# Patient Record
Sex: MALE | Race: WHITE
[De-identification: names, ages, dates, MRNs, and addresses within clinical notes are randomized per-mention and may not be internally consistent; named-entity substitution may affect disease eponyms.]

---

## 2017-01-04 ENCOUNTER — HOSPITAL ENCOUNTER (EMERGENCY)
Age: 57
Discharge: HOME | End: 2017-01-04
Payer: MEDICARE

## 2017-01-04 DIAGNOSIS — N40.0: ICD-10-CM

## 2017-01-04 DIAGNOSIS — Z79.4: ICD-10-CM

## 2017-01-04 DIAGNOSIS — Z79.82: ICD-10-CM

## 2017-01-04 DIAGNOSIS — K21.9: ICD-10-CM

## 2017-01-04 DIAGNOSIS — Z98.61: ICD-10-CM

## 2017-01-04 DIAGNOSIS — M19.90: ICD-10-CM

## 2017-01-04 DIAGNOSIS — R26.9: ICD-10-CM

## 2017-01-04 DIAGNOSIS — E78.00: ICD-10-CM

## 2017-01-04 DIAGNOSIS — E10.42: ICD-10-CM

## 2017-01-04 DIAGNOSIS — I25.10: ICD-10-CM

## 2017-01-04 DIAGNOSIS — R53.1: Primary | ICD-10-CM

## 2017-01-04 DIAGNOSIS — Z95.810: ICD-10-CM

## 2017-01-04 DIAGNOSIS — I10: ICD-10-CM

## 2017-01-04 DIAGNOSIS — I25.2: ICD-10-CM

## 2017-01-04 DIAGNOSIS — Z87.891: ICD-10-CM

## 2017-01-27 ENCOUNTER — HOSPITAL ENCOUNTER (OUTPATIENT)
Age: 57
Discharge: HOME | End: 2017-01-27
Payer: MEDICARE

## 2017-01-27 DIAGNOSIS — E10.65: Primary | ICD-10-CM

## 2017-04-05 ENCOUNTER — HOSPITAL ENCOUNTER (OUTPATIENT)
Age: 57
Discharge: HOME | End: 2017-04-05
Payer: MEDICARE

## 2017-04-05 DIAGNOSIS — D69.2: Primary | ICD-10-CM

## 2017-04-17 ENCOUNTER — HOSPITAL ENCOUNTER (OUTPATIENT)
Age: 57
Discharge: ANOTHER HOSPITAL | End: 2017-04-17
Payer: MEDICARE

## 2017-04-17 ENCOUNTER — HOSPITAL ENCOUNTER (EMERGENCY)
Age: 57
Discharge: HOME | End: 2017-04-17
Payer: MEDICARE

## 2017-04-17 DIAGNOSIS — Z79.4: ICD-10-CM

## 2017-04-17 DIAGNOSIS — R53.1: Primary | ICD-10-CM

## 2017-04-17 DIAGNOSIS — I10: ICD-10-CM

## 2017-04-17 DIAGNOSIS — M19.90: ICD-10-CM

## 2017-04-17 DIAGNOSIS — R26.2: ICD-10-CM

## 2017-04-17 DIAGNOSIS — E10.42: ICD-10-CM

## 2017-04-17 DIAGNOSIS — E78.00: ICD-10-CM

## 2017-04-17 DIAGNOSIS — R51: ICD-10-CM

## 2017-04-17 DIAGNOSIS — I25.2: ICD-10-CM

## 2017-04-17 DIAGNOSIS — K21.9: ICD-10-CM

## 2017-04-17 DIAGNOSIS — Z79.82: ICD-10-CM

## 2017-04-17 DIAGNOSIS — Z87.891: ICD-10-CM

## 2017-04-17 DIAGNOSIS — I25.10: ICD-10-CM

## 2017-04-17 DIAGNOSIS — E86.0: Primary | ICD-10-CM

## 2017-04-17 DIAGNOSIS — E10.649: ICD-10-CM

## 2017-04-17 DIAGNOSIS — N40.0: ICD-10-CM

## 2017-04-17 PROCEDURE — 99284 EMERGENCY DEPT VISIT MOD MDM: CPT

## 2017-04-17 PROCEDURE — 80053 COMPREHEN METABOLIC PANEL: CPT

## 2017-04-17 PROCEDURE — 36415 COLL VENOUS BLD VENIPUNCTURE: CPT

## 2017-04-17 PROCEDURE — 83690 ASSAY OF LIPASE: CPT

## 2017-04-17 PROCEDURE — 81003 URINALYSIS AUTO W/O SCOPE: CPT

## 2017-04-17 PROCEDURE — 96360 HYDRATION IV INFUSION INIT: CPT

## 2017-04-17 PROCEDURE — 85025 COMPLETE CBC W/AUTO DIFF WBC: CPT

## 2017-05-08 ENCOUNTER — HOSPITAL ENCOUNTER (OUTPATIENT)
Dept: HOSPITAL 76 - ED | Age: 57
Setting detail: OBSERVATION
LOS: 1 days | Discharge: HOME | End: 2017-05-09
Attending: PHYSICIAN ASSISTANT | Admitting: NURSE PRACTITIONER
Payer: MEDICARE

## 2017-05-08 DIAGNOSIS — M19.90: ICD-10-CM

## 2017-05-08 DIAGNOSIS — I50.20: ICD-10-CM

## 2017-05-08 DIAGNOSIS — Z79.4: ICD-10-CM

## 2017-05-08 DIAGNOSIS — I11.0: ICD-10-CM

## 2017-05-08 DIAGNOSIS — G89.29: ICD-10-CM

## 2017-05-08 DIAGNOSIS — F32.9: ICD-10-CM

## 2017-05-08 DIAGNOSIS — F41.9: ICD-10-CM

## 2017-05-08 DIAGNOSIS — E78.5: ICD-10-CM

## 2017-05-08 DIAGNOSIS — E27.1: ICD-10-CM

## 2017-05-08 DIAGNOSIS — E10.65: ICD-10-CM

## 2017-05-08 DIAGNOSIS — I25.10: ICD-10-CM

## 2017-05-08 DIAGNOSIS — Z79.82: ICD-10-CM

## 2017-05-08 DIAGNOSIS — N40.1: ICD-10-CM

## 2017-05-08 DIAGNOSIS — Z95.810: ICD-10-CM

## 2017-05-08 DIAGNOSIS — E66.9: ICD-10-CM

## 2017-05-08 DIAGNOSIS — K21.9: ICD-10-CM

## 2017-05-08 DIAGNOSIS — Z95.5: ICD-10-CM

## 2017-05-08 DIAGNOSIS — Z79.84: ICD-10-CM

## 2017-05-08 DIAGNOSIS — R39.11: ICD-10-CM

## 2017-05-08 DIAGNOSIS — I25.5: ICD-10-CM

## 2017-05-08 DIAGNOSIS — Z79.891: ICD-10-CM

## 2017-05-08 DIAGNOSIS — R20.2: Primary | ICD-10-CM

## 2017-05-08 DIAGNOSIS — Z79.51: ICD-10-CM

## 2017-05-08 DIAGNOSIS — E10.42: ICD-10-CM

## 2017-05-08 DIAGNOSIS — Z79.899: ICD-10-CM

## 2017-05-08 DIAGNOSIS — K58.9: ICD-10-CM

## 2017-05-08 DIAGNOSIS — Z96.41: ICD-10-CM

## 2017-05-08 LAB
ALBUMIN/GLOB SERPL: 1.4 {RATIO} (ref 1–2.2)
ANION GAP SERPL CALCULATED.4IONS-SCNC: 7 MMOL/L (ref 6–13)
BASOPHILS NFR BLD AUTO: 0.1 10^3/UL (ref 0–0.1)
BASOPHILS NFR BLD AUTO: 0.6 %
BILIRUB BLD-MCNC: 0.4 MG/DL (ref 0.2–1)
BUN SERPL-MCNC: 20 MG/DL (ref 6–20)
CALCIUM UR-MCNC: 9.2 MG/DL (ref 8.5–10.3)
CHLORIDE SERPL-SCNC: 96 MMOL/L (ref 101–111)
CO2 SERPL-SCNC: 31 MMOL/L (ref 21–32)
CREAT SERPLBLD-SCNC: 1.2 MG/DL (ref 0.6–1.2)
EOSINOPHIL # BLD AUTO: 0.1 10^3/UL (ref 0–0.7)
EOSINOPHIL NFR BLD AUTO: 0.9 %
ERYTHROCYTE [DISTWIDTH] IN BLOOD BY AUTOMATED COUNT: 13.6 % (ref 12–15)
GFRSERPLBLD MDRD-ARVRAT: 63 ML/MIN/{1.73_M2} (ref 89–?)
GLOBULIN SER-MCNC: 2.8 G/DL (ref 2.1–4.2)
GLUCOSE SERPL-MCNC: 362 MG/DL (ref 70–100)
HCT VFR BLD AUTO: 37.7 % (ref 42–52)
HGB UR QL STRIP: 12.8 G/DL (ref 14–18)
LIPASE SERPL-CCNC: 22 U/L (ref 22–51)
LYMPHOCYTES # SPEC AUTO: 1.5 10^3/UL (ref 1.5–3.5)
LYMPHOCYTES NFR BLD AUTO: 14.6 %
MCH RBC QN AUTO: 31.3 PG (ref 27–31)
MCHC RBC AUTO-ENTMCNC: 34 G/DL (ref 32–36)
MCV RBC AUTO: 92.1 FL (ref 80–94)
MONOCYTES # BLD AUTO: 1 10^3/UL (ref 0–1)
MONOCYTES NFR BLD AUTO: 9.8 %
NEUTROPHILS # BLD AUTO: 7.5 10^3/UL (ref 1.5–6.6)
NEUTROPHILS # SNV AUTO: 10.2 X10^3/UL (ref 4.8–10.8)
NEUTROPHILS NFR BLD AUTO: 74.1 %
NRBC # BLD AUTO: 0 /100WBC
PDW BLD AUTO: 9.6 FL (ref 7.4–11.4)
POTASSIUM SERPL-SCNC: 5 MMOL/L (ref 3.5–5)
PROT SPEC-MCNC: 6.7 G/DL (ref 6.7–8.2)
RBC MAR: 4.1 10^6/UL (ref 4.7–6.1)
SODIUM SERPLBLD-SCNC: 134 MMOL/L (ref 135–145)
WBC # BLD: 10.2 X10^3/UL

## 2017-05-08 PROCEDURE — 93005 ELECTROCARDIOGRAM TRACING: CPT

## 2017-05-08 PROCEDURE — 99217: CPT

## 2017-05-08 PROCEDURE — 96360 HYDRATION IV INFUSION INIT: CPT

## 2017-05-08 PROCEDURE — 80048 BASIC METABOLIC PNL TOTAL CA: CPT

## 2017-05-08 PROCEDURE — 70498 CT ANGIOGRAPHY NECK: CPT

## 2017-05-08 PROCEDURE — 93010 ELECTROCARDIOGRAM REPORT: CPT

## 2017-05-08 PROCEDURE — 70496 CT ANGIOGRAPHY HEAD: CPT

## 2017-05-08 PROCEDURE — 96361 HYDRATE IV INFUSION ADD-ON: CPT

## 2017-05-08 PROCEDURE — 99284 EMERGENCY DEPT VISIT MOD MDM: CPT

## 2017-05-08 PROCEDURE — 70450 CT HEAD/BRAIN W/O DYE: CPT

## 2017-05-08 PROCEDURE — 83690 ASSAY OF LIPASE: CPT

## 2017-05-08 PROCEDURE — 99218: CPT

## 2017-05-08 PROCEDURE — 80053 COMPREHEN METABOLIC PANEL: CPT

## 2017-05-08 PROCEDURE — 85025 COMPLETE CBC W/AUTO DIFF WBC: CPT

## 2017-05-08 PROCEDURE — 36415 COLL VENOUS BLD VENIPUNCTURE: CPT

## 2017-05-08 PROCEDURE — 93306 TTE W/DOPPLER COMPLETE: CPT

## 2017-05-08 PROCEDURE — 99285 EMERGENCY DEPT VISIT HI MDM: CPT

## 2017-05-08 RX ADMIN — PREGABALIN SCH MG: 100 CAPSULE ORAL at 21:25

## 2017-05-08 RX ADMIN — CARVEDILOL SCH MG: 12.5 TABLET, FILM COATED ORAL at 21:12

## 2017-05-08 RX ADMIN — OXYCODONE SCH MG: 5 TABLET ORAL at 21:11

## 2017-05-08 RX ADMIN — PANTOPRAZOLE SODIUM SCH MG: 40 TABLET, DELAYED RELEASE ORAL at 21:11

## 2017-05-08 RX ADMIN — GABAPENTIN SCH MG: 300 CAPSULE ORAL at 21:11

## 2017-05-08 RX ADMIN — SODIUM CHLORIDE, PRESERVATIVE FREE SCH: 5 INJECTION INTRAVENOUS at 21:27

## 2017-05-08 NOTE — CT REPORT
EXAM:

CT ANGIOGRAM HEAD AND NECK .

CT SCAN OF THE HEAD WITHOUT AND WITH CONTRAST.

 

EXAM DATE: 5/8/2017 05:46 PM.

 

CLINICAL HISTORY: L sided numbness.

 

COMPARISON: CT scan of the head 05/08/2017, 01/04/2017.

 

TECHNIQUE: Routine axial helical CTA imaging was performed from the aortic arch through the [place] of
 [place]. Routine axial helical CT imaging of the head was performed prior to and following intravenou
s contrast administration. Iodinated IV contrast: 100 cc Isovue-300. Reconstructions: Routine multipl
sirena 3D MIP reconstructions. NASCET Criteria are used for stenosis measurements.

 

In accordance with CT protocol optimization, one or more of the following dose reduction techniques w
ere utilized for this exam: automated exposure control, adjustment of mA and/or KV based on patient s
ize, or use of iterative reconstructive technique.

 

FINDINGS:

CT scan of the head without and with contrast:

No acute intracranial abnormality. No infarct, mass, hemorrhage, or abnormal enhancement.

The ventricles, sulci, and cisterns are unremarkable.

The skull is intact. Opacification is seen at the left frontoethmoidal recess. The mastoid air cells 
are clear.

 

CT angiogram of the extracranial cerebral circulation:

Mild tortuosity of the aortic arch is seen. Note is made of conjoined origin of the right brachioceph
alic and left common carotid arteries. Great vessels off the arch are patent.

 

Right Carotid: The common carotid, internal carotid, and external carotid arteries are widely patent.
 No dissection, significant atherosclerotic plaque, or calcification identified.

 

Left Carotid: The common carotid, internal carotid, and external carotid arteries are widely patent. 
No dissection, significant atherosclerotic plaque, or calcification identified.

 

Vertebrals: The vertebrobasilar system shows no stenosis, dissection, aneurysm, or significant athero
sclerotic disease. The left vertebral artery is dominant. The left vertebral artery forms the basilar
 artery. The right vertebral artery is small in caliber and terminates as the right PICA.

 

CT angiogram Intracranial Circulation: 

Normal. No stenoses or aneurysms of the visualized vessels.

The right A1 segment of the [place] is aplastic. Bilateral A2 segments are supplied from the left ICA thr
ough a patent A-comm. There is near azygos appearance to the [place] arising from the left A2 segment. Sm
all right A2 segment is seen.

There is near fetal origin of the right PCA is continuation of the right P-comm. P1 segment off the b
asilar tip on the right is hypoplastic. The left P-comm is small in caliber but patent.

 

The dural sinuses are patent and enhance normally.

 

Other: The visualized lung apices are clear.

The muscle and fascial planes of the neck are unremarkable.

No lytic or blastic bony lesions are seen. No significant cervical spondylosis. Note is made of anter
ior subluxation at the TMJ bilaterally.

 

IMPRESSION: 

CT scan of the head without and with contrast:

1. Negative CT scan of the head without and with contrast. No acute abnormality.

 

CT angiogram of the neck:

1. Unremarkable CT angiogram of the extracranial cerebral circulation. No significant atherosclerotic
 change or stenosis. No dissection.

2. The left vertebral artery is dominant. The right vertebral artery is diffusely small in caliber.

 

CT angiogram of the head:

1. Unremarkable CT angiogram of the intracranial cerebral circulation. No aneurysm. No significant st
enosis.

2. Note is made of near isolated right cerebral circulation. The right ICA terminates as the MCA, wit
h right A1 segment aplastic. There is near fetal origin of the right PCA is continuation of the right
 P-comm.

3. The left vertebral artery is dominant forming the basilar artery.

 

[facility]

Referring Provider Line: [phone]

 

SITE ID: [id number]

## 2017-05-08 NOTE — CT REPORT
EXAM:

CT HEAD

 

EXAM DATE: 5/8/2017 03:34 PM.

 

CLINICAL HISTORY: L sided numbness.

 

COMPARISON: None.

 

TECHNIQUE: Multiaxial CT images were obtained from the foramen magnum to the vertex. IV contrast: Non
e. Reformats: Coronal.

 

In accordance with CT protocol optimization, one or more of the following dose reduction techniques w
ere utilized for this exam: automated exposure control, adjustment of mA and/or KV based on patient s
ize, or use of iterative reconstructive technique.

 

FINDINGS:

Parenchyma: No intraparenchymal hemorrhage. No evidence of mass, midline shift, or CT findings of inf
arction. Gray-white differentiation is distinct.

 

Extraaxial Spaces: Normal for age. No subdural or epidural collections identified.

 

Ventricles: Normal in size and position.

 

Sinuses: Imaged paranasal sinuses, orbits, and mastoids show no significant abnormality.

 

Bones: No evidence of fracture or calvarial defect.

 

Other: None.

 

IMPRESSION: Negative nonenhanced head CT.

 

[facility]

Referring Provider Line: [phone]

 

SITE ID: [id number]

## 2017-05-08 NOTE — CT PRELIMINARY REPORT
Accession: [id number]

Exam: CT Neck Angio

 

IMPRESSION: 

CT scan of the head without and with contrast:

1. Negative CT scan of the head without and with contrast. No acute abnormality.

 

CT angiogram of the neck:

1. Unremarkable CT angiogram of the extracranial cerebral circulation. No significant atherosclerotic
 change or stenosis. No dissection.

2. The left vertebral artery is dominant. The right vertebral artery is diffusely small in caliber.

 

CT angiogram of the head:

1. Unremarkable CT angiogram of the intracranial cerebral circulation. No aneurysm. No significant st
enosis.

2. Note is made of near isolated right cerebral circulation. The right ICA terminates as the MCA, wit
h right A1 segment aplastic. There is near fetal origin of the right PCA is continuation of the right
 P-comm.

3. The left vertebral artery is dominant forming the basilar artery.

 

[facility]

 

SITE ID: [id number]

## 2017-05-08 NOTE — ED PHYSICIAN DOCUMENTATION
PD HPI FOCAL NEURO





- Stated complaint


Stated Complaint: LEFT SIDE NUMBNESS





- Chief complaint


Chief Complaint: Neuro





- History obtained from


History obtained from: Patient





- History of Present Illness


Timing - onset: Other (awoke with symptoms at 0800)


Timing - details: Constant, Other (awoke with sx)


Severity of deficit: Mild


Weakness: No: Face, Arm, Hand, Leg, Foot, Right, Left


Numbness: Face, Arm, Hand, Leg, Foot (chronic neuropathy in B feet), Left


Associated symptoms: No: Headache, Nausea / vomiting, Seizure, Syncope, Fall, 

Head injury, Chest pain, Neck pain, Back pain


Baseline status: positive: A&OX3, ambulatory, indep


Similar symptoms before: Has not had sx before


Recently seen: Not recently seen





Review of Systems


Ten Systems: 10 systems reviewed and negative


Constitutional: denies: Fever, Chills


Nose: denies: Rhinorrhea / runny nose, Congestion


Respiratory: denies: Cough


GI: denies: Abdominal Pain, Nausea, Vomiting


Skin: denies: Rash


Musculoskeletal: denies: Neck pain, Back pain


Neurologic: denies: Focal weakness, Confused, Altered mental status





PD PAST MEDICAL HISTORY





- Past Medical History


Cardiovascular: Hypertension, High cholesterol, Coronary artery disease, MI


Respiratory: Shortness of breath


Neuro: Peripheral neuropathy


Endocrine/Autoimmune: Type 1 diabetes, Other


GI: GERD, Chronic diarrhea


: Benign prostate hypertrophy, Other


HEENT: None


Psych: Depression, Anxiety, Panic attacks


Musculoskeletal: Osteoarthritis





- Past Surgical History


Past Surgical History: Yes


General: Colonoscopy, EGD


Ortho: Carpal Tunnel surgery, Other


Cardiovascular: Coronary stent, AICD, Cardiac catheterization


HEENT: Other





- Present Medications


Home Medications: 


 Ambulatory Orders











 Medication  Instructions  Recorded  Confirmed


 


Carvedilol 12.5 mg PO BID 11/11/14 04/17/17


 


Lisinopril 20 mg PO DAILY 11/11/14 05/08/17


 


Pantoprazole [Protonix] 40 mg PO BIDAC 11/11/14 05/08/17


 


Ranitidine HCl 300 mg PO QPM 11/11/14 05/08/17


 


Spironolactone 12.5 mg PO DAILY 11/11/14 05/08/17


 


Zolpidem Tartrate 10 mg PO QPM PRN 11/11/14 05/08/17


 


Multivitamin [Multi-Vitamin Daily] 1 each PO DAILY 11/19/14 05/08/17


 


oxyCODONE [Roxicodone] 5 mg PO Q4HR 05/04/15 05/08/17


 


Aspirin [Aspirin EC] 81 mg PO DAILY 12/14/16 05/08/17


 


Duloxetine HCl [Cymbalta] 60 mg PO BID 12/14/16 05/08/17


 


Insulin Lispro [Humalog] 1 - 11 units SQ .SLIDING SCALE 12/14/16 05/08/17


 


Lidocaine Patch 5% [Lidoderm Patch] 1 - 3 patch TOP DAILY PRN 12/14/16 05/08/17


 


Pregabalin [Lyrica] 300 mg PO BID 12/14/16 05/08/17


 


Alprazolam [Alprazolam] 0.25 mg PO BID PRN 05/08/17 05/08/17


 


Budesonide [Budesonide EC] 9 mg PO DAILY 05/08/17 05/08/17


 


Bupropion HCl [Bupropion Xl] 450 mg PO DAILY 05/08/17 05/08/17


 


Diclofenac Sodium [Voltaren] 4 gm TOP QID PRN 05/08/17 05/08/17


 


Hydrocortisone 10 mg PO QDBREAKFAST 05/08/17 05/08/17


 


Hydrocortisone [Cortef] 5 mg PO QDDINNER 05/08/17 05/08/17


 


Hydrocortisone [Cortef] 5 mg PO QDLUNCH 05/08/17 05/08/17


 


Nortriptyline HCl 20 mg PO 0800,1400 05/08/17 


 


Simvastatin [Simvastatin] 80 mg PO QPM 05/08/17 05/08/17


 


Tamsulosin [Flomax] 0.4 mg PO DAILY 05/08/17 05/08/17


 


metFORMIN [Glucophage] 500 mg PO BIDWM 05/08/17 05/08/17














- Allergies


Allergies/Adverse Reactions: 


 Allergies











Allergy/AdvReac Type Severity Reaction Status Date / Time


 


No Known Drug Allergies Allergy   Verified 05/08/17 12:40














- Social History


Does the pt smoke?: No


Smoking Status: Former smoker


Does the pt drink ETOH?: No


Does the pt have substance abuse?: No





- Immunizations


Immunizations are current?: Yes





- POLST


Patient has POLST: No





PD ED PE NORMAL





- Vitals


Vital signs reviewed: Yes





- General


General: Alert and oriented X 3, No acute distress





- HEENT


HEENT: PERRL, Moist mucous membranes





- Neck


Neck: Supple, no meningeal sign





- Cardiac


Cardiac: RRR





- Respiratory


Respiratory: No respiratory distress, Clear bilaterally





- Abdomen


Abdomen: Soft, Non tender





- Back


Back: No CVA TTP, No spinal TTP





- Derm


Derm: Warm and dry, No rash





- Extremities


Extremities: No tenderness to palpate, No calf tenderness / cord





- Neuro


Neuro: Alert and oriented X 3, CNs 2-12 intact, No motor deficit, Normal speech





NIHSS





- Time


Time: 14:40





- Level of Consciousness


Level of consciousness: (0) Alert, Keenly responsive


LOC Questions: (0) Answers both Q's correct


LOC Commands: (0) Performs both correctly





- Gaze


Best Gaze: (0) Normal





- Visual


Visual: (0) No loss





- Facial Palsy


Facial Palsy: (0) Normal, symmetrical movement





- Motor Arms (both separate)


Motor Arm (right): (0) No drift


Motor Arm (left): (0) No drift





- Motor Legs (both separate)


Motor Leg (right): (0) No drift


Motor Leg (left): (0) No drift





- Limb Ataxia


Limb Ataxia: (0) Absent





- Sensory


Sensory: (1) Mild-to-moderate loss





- Best Language


Best Language: (0) No aphasia





- Dysarthria


Dysarthria: (0) Normal





- Extinction and Inattention (formally neg


Extinction and inattention: (0) No abnormality





- Total Score/Results


Total Score/Result: 1





Results





- Vitals


Vitals: 


 Vital Signs - 24 hr











  05/08/17 05/08/17 05/08/17





  12:37 14:40 17:06


 


Temperature 36.0 C L  


 


Heart Rate 90 82 80


 


Respiratory 18 16 16





Rate   


 


Blood Pressure 109/65 107/61 116/59 L


 


O2 Saturation 99 97 100








 Oxygen











O2 Source [With Activity]      Room air


 


O2 Source                      Room air

















- EKG (time done)


  ** 1457


Rate: Rate (enter#) (78)


Rhythm: NSR


Axis: Normal


Intervals: Normal [place]


QRS: Normal


Ischemia: Normal ST segments, Q waves (V2-5)


Computer interpretation: Agree with computer





- Labs


Labs: 


 Laboratory Tests











  05/08/17 05/08/17





  14:55 14:55


 


WBC  10.2 


 


RBC  4.10 L 


 


Hgb  12.8 L 


 


Hct  37.7 L 


 


MCV  92.1 


 


MCH  31.3 H 


 


MCHC  34.0 


 


RDW  13.6 


 


Plt Count  212 


 


MPV  9.6 


 


Neut #  7.5 H 


 


Lymph #  1.5 


 


Mono #  1.0 


 


Eos #  0.1 


 


Baso #  0.1 


 


Absolute Nucleated RBC  0.00 


 


Nucleated RBCs  0.0 


 


Sodium   134 L


 


Potassium   5.0


 


Chloride   96 L


 


Carbon Dioxide   31


 


Anion Gap   7.0


 


BUN   20


 


Creatinine   1.2


 


Estimated GFR (MDRD)   63 L


 


Glucose   362 H


 


Calcium   9.2


 


Total Bilirubin   0.4


 


AST   15


 


ALT   24


 


Alkaline Phosphatase   88


 


Total Protein   6.7


 


Albumin   3.9


 


Globulin   2.8


 


Albumin/Globulin Ratio   1.4


 


Lipase   22














- Rads (name of study)


  ** head CT


Radiology: Prelim report reviewed, EMP read contemporaneously, See rad report (

Negative nonenhanced head CT. )





  ** brain angio CT


Radiology: Prelim report reviewed, EMP read contemporaneously, See rad report (

Unremarkable CT angiogram of the intracranial cerebral circulation. No 

aneurysm. No significant stenosis. 2. Note is made of near isolated right 

cerebral circulation. The right ICA terminates as the MCA, with right A1 

segment aplastic. There is near fetal origin of the right PCA is continuation 

of the right P-comm. . The left vertebral artery is dominant forming the 

basilar artery. )





  ** neck angio CT


Radiology: Prelim report reviewed, EMP read contemporaneously, See rad report (

Unremarkable CT angiogram of the extracranial cerebral circulation. No 

significant atherosclerotic change or stenosis. No dissection.  The left 

vertebral artery is dominant. The right vertebral artery is diffusely small in 

caliber. )





PD MEDICAL DECISION MAKING





- ED course


Complexity details: reviewed results, re-evaluated patient, considered 

differential, d/w patient, d/w consultant


ED course: 





Patient is a 56-year-old male who presents to the emergency department with left

-sided numbness.  This is over the face, upper extremity, chest, abdomen, left 

lower extremity.  Concern for acute stroke.  Symptoms have been ongoing for 

over 6 hours prior to arrival.  Not a TPA candidate.  NIH stroke scale of 1.  

Discussed the case with the hospitalist will place him observation for further 

evaluation.





This document was made in part using voice recognition software. While efforts 

are made to proofread this document, sound alike and grammatical errors may 

occur.





Departure





- Departure


Disposition: ED Place in Observation


Clinical Impression: 


 Paresthesia, Hyperglycemia





Stroke


Qualifiers:


 CVA mechanism: unspecified Qualified Code(s): I63.9 - Cerebral infarction, 

unspecified


Condition: Stable


Discharge Date/Time: 05/08/17 18:11

## 2017-05-08 NOTE — CT PRELIMINARY REPORT
Accession: [id number]

Exam: CT Head W/O

 

IMPRESSION: Negative nonenhanced head CT.

 

[facility]

 

SITE ID: [id number]

## 2017-05-09 VITALS — SYSTOLIC BLOOD PRESSURE: 101 MMHG | DIASTOLIC BLOOD PRESSURE: 60 MMHG

## 2017-05-09 LAB
ANION GAP SERPL CALCULATED.4IONS-SCNC: 6 MMOL/L (ref 6–13)
BUN SERPL-MCNC: 17 MG/DL (ref 6–20)
CALCIUM UR-MCNC: 9.2 MG/DL (ref 8.5–10.3)
CHLORIDE SERPL-SCNC: 99 MMOL/L (ref 101–111)
CO2 SERPL-SCNC: 31 MMOL/L (ref 21–32)
CREAT SERPLBLD-SCNC: 0.8 MG/DL (ref 0.6–1.2)
GFRSERPLBLD MDRD-ARVRAT: 100 ML/MIN/{1.73_M2} (ref 89–?)
GLUCOSE SERPL-MCNC: 268 MG/DL (ref 70–100)
POTASSIUM SERPL-SCNC: 4.4 MMOL/L (ref 3.5–5)
SODIUM SERPLBLD-SCNC: 136 MMOL/L (ref 135–145)

## 2017-05-09 RX ADMIN — PREGABALIN SCH MG: 100 CAPSULE ORAL at 08:32

## 2017-05-09 RX ADMIN — PANTOPRAZOLE SODIUM SCH MG: 40 TABLET, DELAYED RELEASE ORAL at 06:15

## 2017-05-09 RX ADMIN — OXYCODONE SCH MG: 5 TABLET ORAL at 14:07

## 2017-05-09 RX ADMIN — GABAPENTIN SCH MG: 300 CAPSULE ORAL at 08:31

## 2017-05-09 RX ADMIN — CARVEDILOL SCH MG: 12.5 TABLET, FILM COATED ORAL at 08:32

## 2017-05-09 RX ADMIN — OXYCODONE SCH MG: 5 TABLET ORAL at 08:32

## 2017-05-09 RX ADMIN — SODIUM CHLORIDE, PRESERVATIVE FREE SCH: 5 INJECTION INTRAVENOUS at 05:12

## 2017-05-09 RX ADMIN — OXYCODONE SCH MG: 5 TABLET ORAL at 04:37

## 2017-05-09 RX ADMIN — OXYCODONE SCH MG: 5 TABLET ORAL at 00:37

## 2017-05-09 NOTE — HISTORY & PHYSICAL EXAMINATION
DATE OF OBSERVATION: 2017.

 

PRIMARY CARE PROVIDER: Dr. Ray. 

 

ENDOCRINOLOGIST: Dr. Vazquez in [place], he last saw him about 6 weeks ago.

 

CHIEF COMPLAINT: Woke up with numbness in his knee and wrist.

 

HISTORY OF PRESENT ILLNESS: The patient is a 56-year-old male with a very 
lengthy medical history as noted below under past medical history with a 
corresponding lengthy list of medications also noted below. Most notable for 
type 1 diabetes since he was age 13 with a poorly controlled A1c of 9.8 
currently, which he says is much better than usual and coronary artery disease 
status post MI and ichemic cardiomyopathy status post AICD and an EF, of about 
35% which he reports has been stable. Today upon awakening, he noted that he 
had a numbness sensation around his left knee and he also noted this in his 
left wrist and he felt it over a period of 4 hours. This sort of worked its way 
up his leg to his hip and up his arm. There was no associated discoordination, 
although he says he felt "unstable." There were no falls. There was no 
associated dysarthria, no trouble chewing or swallowing, and no prior history 
of the same. Of note, he is on daily aspirin at home, which he does not miss. 
He is also on a statin. In the emergency room, a CT showed no bleed. Of note, 
it is not clear what his sugars were at home today, but in the emergency room 
it was 362 and he denies having history of such paresthesias with his elevated 
glucoses. Of note, he is on medication for peripheral neuropathy. The ER spoke 
with [facility] who indicated no indication a thrombolytic. They 
recommended an MRI; however, the patient does have an AICD, so therefore, he 
was sent for a CTA of the neck and head. In the time frame that we have been 
doing the HPI on the patient, the CT angiogram results have actually come back.

 

CT angiogram of the neck unremarkable. CT angiogram, no significant 
atherosclerotic change or stenosis, no dissection. Left vertebral artery is 
dominant and right vertebral artery diffusely small in caliber.

 

CT angiogram of the head unremarkable. CT angiogram of the intracranial 
cerebral circulation, no aneurysm, no significant stenosis. Note is made of 
near isolated right cerebral circulation. The right ICA terminates at the MCA 
with a right A1 segment aplastic. There is near fetal origin of the right PCA 
continuation of the right P communicating artery. The left vertebral artery is 
dominant forming the basilar artery. 

 

He is admitted for observation to evaluate for resolution of these paresthesias.

 

PAST MEDICAL HISTORY

1. Diabetes mellitus type 1 since age 13 with an insulin pump. He has been 
seeing a Dr. Vazquez at [facility]. A1c as today is 9.8.

2. Coronary artery disease status post stent. His cardiologist is a Dr. Saab at [facility].

3. Ischemic cardiomyopathy with an EF of 35% and an AICD.

4. Hypertension.

5. Hyperlipidemia.

6. Obesity with a BMI of 34.

7. Osteoarthritis with chronic pain in the neck, shoulder, and hand.

8. History of depression and anxiety.

9. Panic attacks, which he denies recently. They were mostly associated with 
his MI.

10. GERD.

11. BPH.

12. History of hypospadias.

13. Peripheral neuropathy.

14. A diagnosis of "new daily persistent headaches." He has established with a 
neurologist at [facility] in [place].

15. Diabetic retinopathy.

16. History of microscopic colitis. The patient calls is collagenous colitis.

17. Irritable bowel syndrome.

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

 

SURGICAL HISTORY

Includes

1. AICD implantation.

2. Trigger finger release x5.

3. Hypospadias repair.

4. Carpal tunnel release.

5. Colonoscopy and EGD.

6. Right foot tumor removal.

7. Sinus surgery x2.

8. Vasectomy.

9. Cardiac catheterization and stent placement.

10. Insulin pump.

 

ALLERGIES: NO KNOWN DRUG ALLERGIES.

 

MEDICATION LIST

This was confirmed with the patient and by the pharmacist.

1. Flomax 0.4 mg once daily.

2. Simvastatin 80 mg once daily.

3. Metformin 500 mg p.o. b.i.d.

4. Hydrocortisone 5 mg daily at lunch and hydrocortisone 5 mg daily at dinner.

5. Diclofenac 4 grams topical 4 times daily as needed for pain.

6. The patient reports that the budesonide was stopped since he was now on 
hydrocortisone. That does not entirely make sense to me, but that is what the 
patient says.

7. Alprazolam 0.25 mg twice daily if needed for anxiety.

8. Hydrocortisone 10 mg p.o. at breakfast.

9. Multivitamin 1 each day.

10. Duloxetine 60 mg twice daily.

11. Ranitidine 300 mg once q.p.m. for GERD.

12. Lisinopril 20 mg once daily.

13. Lidocaine patch 1-3 patches topical as needed for neuropathy.

14. Zolpidem 10 mg daily at bedtime.

15. Spironolactone 12.5 mg once daily.

16. Pantoprazole 40 mg twice daily.

17. Pregabalin 300 mg twice daily.

18. Aspirin 81 mg once daily.

19. Bupropion 450 mg once daily.

20. Oxycodone 5 mg every 4 hours if needed for pain.

21. Coreg 12.5 mg twice daily.

22. The patient is no longer taking nortriptyline because he is now taking 
Lyrica for neuropathy.

23. He is taking Gabapentin 300 mg twice daily as well.

24. Salicylate 262 mg 3 times daily for his microscopic colitis.

25. Finally, insulin pump, which the patient manages himself. He does carb 
count and manages both the basal and the meal based bolus and correction.

 

REVIEW OF SYSTEMS

GENERAL: He denies any unintentional weight loss or weight gain. No fevers, 
night sweats, or lymphadenopathy.

HEAD, EARS, EYES, NOSE AND THROAT: He does have retinopathy, wears glasses. No 
recent changes. No sore throat. No trouble chewing or swallowing, no dental 
problems.

CARDIOVASCULAR: He denies any recent chest pain or pressure. No palpitations. 
No edema despite his EF of 35%. No orthopnea, no PND. He does sleep with the 
head of the bed elevated, primarily for his GERD.

RESPIRATORY: No shortness of breath, cough or wheezing.

GASTROINTESTINAL: He does have loose bowels daily, which are relatively well 
controlled with Evista for microscopic colitis. He previously was on budesonide
, but he says that that was stopped in favor of hydrocortisone for Rosendo 
disease. He denies any hematochezia or black tarry stool. He does have dark 
stools because of the bismuth, but they are not sticky.

GENITOURINARY: He has BPH and is on Flomax. He does note that he does have 
considerable hesitancy and that is not new. MUSCULOSKELETAL: Chronic pain with 
arthritis in his shoulders, neck, back, and ankles, for which he takes p.r.n. 
oxycodone. He denies any falls. He does not have any assistive device.

SKIN: No skin changes. His insulin pump site has been without issues. He does 
note that he gets some "petechial-like" lesions on his forearms that his PCP 
worked up by checking platelet count, which is fine and he continues to get 
bruising.

NEUROLOGIC: Peripheral neuropathy in his lower extremities. Otherwise, as per 
the HPI. He says that he has a very recent diagnosis of "new persistent daily 
headaches," which evidently is an ICD code; however, this was also already 
noted back in December, 6 months ago.

PSYCHIATRIC: He reports his mood is controlled on his home medications.

 

SOCIAL HISTORY: He was born and raised in the [facility], then he lived 
in [place], and 4 years ago moved to [place] near where his daughter 
lives. 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 He has 1 daughter nearby 
and another son lives in [place]. He is on disability since his heart attack 
in .

 

HABITS: He used to smoke, stopped in , half pack a day for about 15 years. 
He has a very rare drink. No illicits.

 

FAMILY HISTORY: Father had coronary artery disease, COPD, and prostate cancer. 
Evidently, also hypospadias. Father  at age 86 due to COPD. His mother  
at age 52 due to metastatic lung cancer. He has 1 sister with diabetes and 
asthma and 1 sister with history of bladder cancer.

 

PHYSICAL EXAMINATION

VITAL SIGNS: In the emergency room, he was afebrile at 36.0, heart rate 
initially 90, when I rechecked it was 80, blood pressure 107//59, 
respiratory rate 16, and oxygenation % on room air.

GENERAL: The patient was seen after admission to the floor following his CTA of 
the head and neck. He is a very pleasant, alert, oriented gentleman who has a 
slightly generous habitus. He is in no acute distress. Given his presenting 
symptoms of paresthesias in the left hand, it is noted during the exam that he 
uses his left upper extremity freely, especially in using his iPhone to look up 
information regarding his medications.

HEAD, EYES, EARS, NOSE AND THROAT: He has very short, cropped hair. Head is 
normocephalic, atraumatic. He is wearing glasses. His extraocular movements are 
intact with no nystagmus. Pupils are equal. His extraocular movements are 
intact. Oral mucosa is moist. He has somewhat yellowed dentition.

NECK: Has no appreciable lymphadenopathy. Carotids are +2. I do not appreciate 
any bruits.

HEART: Somewhat distant S1, S2 with no appreciable extra sounds. No murmur. He 
does have an AICD under his skin. His periphery is warm. There is no mottling. 
He has no appreciable edema on his pretibial area. He does have what appears to 
be an old healed scar versus hemosiderin staining on his right shin. His radial 
pulses are +2.

RESPIRATORY: Unlabored respirations lying flat. When he sits up, his breath 
sounds are clear to auscultation.

GASTROINTESTINAL: His abdomen is generous. There is an insulin pump at the 
right lower abdomen. His abdomen is soft, nondistended, nontender with no 
appreciable organomegaly. No suprapubic tenderness.

EXTREMITIES: No appreciable joint redness, pain, or pallor. Specifically, the 
left knee and the left wrist, with which he complained of paresthesias, were 
unremarkable in his hand. 

NEUROLOGIC: He is alert, oriented, appropriate. Cranial nerves 2-12 are intact 
with extraocular movements intact. Pupils are reactive. His face is symmetric. 
His smile is without droop. Tongue is midline. Shoulder shrug and head turning 
are normal. He can do rapid alternating movements with no trouble. He is 
articulate in speech and has a negative drift as noted initially. He is using 
his left hand freely with no discoordination.

 

DIAGNOSTIC STUDIES AND IMAGING: As above, CT of the brain showed no acute 
findings. Findings of the CT angio of the head and neck are as detailed in the 
H and P.

 

LABORATORY STUDIES: White count 10.2, hemoglobin 12.8, hematocrit 37.2, 
platelets 212,000. Sodium 134, potassium 5.0, chloride 96, bicarbonate 31, BUN 
20, creatinine 1.2, glucose 362. An A1c measured here was 9.8.

 

EKG showed normal sinus rhythm with normal axis with a rate of approximately 78 
with evidence of an old anterior infarct.

 

ASSESSMENT AND PLAN

1. Possible transient ischemic attack. The presentation of the paresthesia is 
somewhat unusual for TIA ,and with negative imaging persists. He has no 
functional limitation on that side. There were no other corresponding deficits. 
As noted, the CT imaging is unremarkable. We will consider speaking with the 
neurologist tomorrow. If there is any reason whatsoever that the second finding 
on the CT angiogram of the head with "near isolated right cerebral circulation 
findings" would for any reason warrant dual antiplatelet therapy since he was 
already on aspirin coming in,we will consider discussing this with Neurology in 
the morning. Again, his neurologic exam is still unremarkable. I suspect they 
would not have any further intervention. He will continue on his aspirin and 
statin and he also needs better glucose control. He will have neuro checks 
overnight. Possibly this could be a neuropathy from his uncontrolled blood 
sugars.

2. Diabetes mellitus type 1 with poor control. This appears to be chronic. With 
his A1c of 9.8, he says it is actually far better that it had been. He is 
already followed by an endocrinologist. He will be managing his insulin pump on 
his own tonight. He manages his own prandial insulin, as well as correctional 
insulin and does do carb counting. The patient to have close followup with his 
endocrinologist for better control.

3. Coronary artery disease. He continues on his aspirin, statin, beta-blocker, 
and ACE inhibitor.

4. Compensated systolic heart failure. He says his EF of 35% has been quite 
stable. He also has no symptoms of decompensation, he does not weigh himself 
daily, does not know what his dry weight is. He continues on his beta-blocker, 
spironolactone, and ACE inhibitor, and ideally should follow a low salt diet at 
home. He also will continue on his daily Lasix.

5. Microscopic colitis. He continues on his hydrocortisone, which he says has 
replaced Enterocort. He also uses bismuth.  (I think he may have been 
missstating that the hydrocortisonei is for his microscopic colitis, but he 
clearly stated this replaced the enterocort.

6. Reported history of [place]gal disease. His blood pressure is fine. His sodium 
is fine. He will continue on his t.i.d. dosing of hydrocortisone.

7. Depression and anxiety. He will continue on his rather high dose bupropion, 
p.r.n. Xanax, off his rather high dose of Cymbalta.

8. Peripheral neuropathy. He is on gabapentin, which was reduced since the 
addition of Lyrica to 300 mg twice daily and continue Lyrica 300 mg twice daily.

9. Benign prostatic hypertrophy. He continues on his home Flomax. It sounds 
like he is having considerable hesitancy, so he can follow up with his 
urologist and have that reevaluated, possibly need an increased dose.

10. COR STATUS: HE IS A FULL COR.

11. Venous thromboembolism prophylaxis. I anticipate he will be discharged 
early tomorrow, so there is no indication for venous thromboembolism 
prophylaxis.

 

 

 

DD:2017 20:9:00  DT: 2017 03:57  JOB #: [id number]  EXT JOB #:[id number]

[email or web address]

## 2017-05-09 NOTE — DISCHARGE SUMMARY
DATE OF ADMISSION: 2017

 

DATE OF DISCHARGE: 2017

 

DISCHARGING PHYSICIAN: Nadir Hayes PA-C.

 

PRIMARY CARE PROVIDER: Dr. Ray.

 

ADMITTING DIAGNOSES:

1. Possible transient ischemic attack.

2. Diabetes mellitus type 1 with poor control.

3. Coronary artery disease.

4. Compensated systolic heart failure.

 

DISCHARGE DIAGNOSES:

1. Paraesthesia, a unclear etiology, be atypical presentation for transient ischemic attack or cerebr
ovascular accident. See no acute findings on head CT or CTA of head and neck. The echo with bubble st
udy unchanged compared to previous with a left ventricular ejection fraction 45% to 50%.

2. Diabetes mellitus type 1 - poorly controlled with associated diabetic neuropathy.

3. Coronary artery disease - stable.

4. Cardiomyopathy with left ventricular ejection fraction of 45-50%.

5. Automatic implantable cardiac defibrillator in situ.

6. Rosendo disease.

 

DISCHARGE MEDICATIONS:

1. Gabapentin 300 mg p.o. b.i.d.

2. Hydrocortisone 10 mg at breakfast, 5 mg at lunch and dinner.

3. Lisinopril 20 mg daily.

4. Metformin 500 mg b.i.d..

5. Oxycodone 5 mg every 4 hours as needed for pain.

6. Lyrica 300 mg b.i.d..

7. Spironolactone 12.5 mg daily.

8. Carvedilol 12.5 mg p.o. b.i.d..

9. Wellbutrin 450 mg p.o. daily.

10. Atorvastatin 80 mg daily.

11. Aspirin 81 mg daily.

12. Cymbalta 60 mg daily.

 

BRIEF HISTORY OF PRESENT ILLNESS: For specifics please see H and P on admission. This is a gentleman 
who presented for evaluation of numbness in his knee and wrist. He woke up with numbness and felt albaro
t it had slowly increased. He was unaware of any exacerbating or relieving factors. In the emergency 
room a CT showed no evidence of bleeding. ER physician spoke with Neurology at [facility]
 who did not indicate a need for thrombolytic therapy. MRI was recommended, but the patient had an AI
CD, therefore, this was not performed. He did have a CT of his head and neck. 

 

COURSE IN CENTER: The patient was admitted to the hospital for continued observation. Official interp
retation from CT angiogram was obtained which again showed no indication for thrombolytic therapy. Th
e patient was placed on his home medications with the exception of the statin which was changed to Li
pitor. He did well throughout the stay. He did have another episode of numbness in his anterior proxi
mal to mid left thigh and dorsal left forearm. He had no other acute neural focal findings during his
 hospitalization. Had no significant findings on exam, even with the numbness present. The patient ha
d known history of cardiomyopathy with prior echocardiogram showed a left ventricular ejection fracti
on of 35%. A repeat study was performed including a bubble study. The results showed an ejection frac
tion of 45-50% with no PFO present. We were unable to perform an MRI due to AICD in situ. Newer devic
es are able to undergo MRI; however, we are uncertain of the patient's ability as his was 5 years old
.

 

The patient was discharged home in a stable condition. At time of discharge, he was not experiencing 
any numbness in his leg or his arm.

 

IMAGIN. CT angiogram of the head unremarkable. CT angiogram of the intracranial cerebral circulation, no a
neurysm, no significant stenosis.

2. Note is made of near isolated right cerebral circulation, the right ICA terminates at the MCA, wit
h right A1 segment aplastic. There is near fetal origin of the right PCA and continuation of the righ
t posterior common artery.

3. CT angiogram of the neck unremarkable CTA of the extracranial cerebral circulation. No significant
 atherosclerotic changes or stenosis, no dissection. CT scan of head without and with contrast negati
ve CT scan of the head, no acute abnormalities.

4. Echocardiogram, left ventricular ejection fraction 45% to 50%. Wall motion abnormalities consisten
t with prior infarction were present. Bubble study was negative.

 

LABORATORY DATA: Lab from a.m. showed sodium 136, potassium 4.4, chloride 99, carbon dioxide 31, anio
n gap 6.0, BUN 17, creatinine 0.8. Glucose elevated at 268. CBC on admission, white blood cell 10.2, 
hemoglobin 12.8, hematocrit 37.7, platelet 212.

 

PHYSICAL EXAMINATION ON DISCHARGE:

GENERAL: A well-developed, well-nourished, no acute distress.

HEENT: Normocephalic, atraumatic. Pupils equal, round, reactive to light. EOMs intact.

NECK: Supple. No JVD.

CHEST: Clear to auscultation.

CARDIOVASCULAR: Regular rate and rhythm.

ABDOMEN: Benign.

EXTREMITIES: Full range of motion. No gross abnormalities. Strength equal bilaterally. Radial pulses 
2+ bilaterally. Pedal pulses 2+ bilaterally.

NEUROLOGIC: Cranial nerves 2-12 grossly intact. No acute neural focal deficits appreciated on exam.

 

DIETARY RECOMMENDATIONS: Consistent carbohydrate diet with fat and cholesterol modification. 

 

ACTIVITY: Encourage activity daily.

 

TIME SPENT ON DISCHARGE: Greater than 30 minutes.

 

Thank you for the opportunity to participate in the care of the patient. 

 

 

 

DD:2017 17:08:00  DT: 2017 17:59  JOB #: [id number]  EXT JOB #:[id number]

## 2017-05-09 NOTE — DISCHARGE PLAN
Discharge Plan


Disposition: 01 Home, Self Care


Condition: Stable


Prescriptions: 


Atorvastatin [Lipitor] 80 mg PO QPM #90 tablet


Diet: Low Sodium


Activity Restrictions: No Restrictions


Shower Restrictions: No


Driving Restrictions: No


Weight Bearing: Full Weight


Additional Instructions or Follow Up instructions: 


Please talk to your PCP about a consult to neurology.


No Smoking: If you smoke, Please STOP!  Call [phone] for help.


Follow-up with: 


Leo Ray MD [Primary Care Provider] -

## 2017-05-09 NOTE — PROVIDER PROGRESS NOTE
Assessment/Plan





- Problem List


(1) Paresthesia


Assessment/Plan: 


Unclear etiology


Atypical presentation for TIA/CVA as the area of effect is limited to the left 

anterior thigh and knee and the left forarm and elbow


Differential include inflammatory response in leading to nerve conduction 

deficits.  


Unable to perform MRI due to AICD





Plan


ECHO with bubble study.  


Continue ASA and statin daily


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  I do not suspect this 

as the source for his presenting Sx.   








(2) Type 1 diabetes mellitus


Qualifiers: 


   Diabetes mellitus complication status: without complication   Qualified Code(

s): E10.9 - Type 1 diabetes mellitus without complications   


Assessment/Plan: 


Poorly controlled


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





Plan


Improved control through life style changes


Wt loss











(3) CAD (coronary artery disease)


Qualifiers: 


   Coronary Disease-Associated Artery/Lesion type: native artery   Native vs. 

transplanted heart: native heart   Associated angina: without angina   

Qualified Code(s): I25.10 - Atherosclerotic heart disease of native coronary 

artery without angina pectoris   


Assessment/Plan: 


PT will maintain ASA, BB, ACEi and Statin.  


Encourage lifestyle modification and risk factor reduction.


Wt loss with a goal of BMI <25.


Better control of DM











(5) Cardiomyopathy


Qualifiers: 


   Cardiomyopathy type: ischemic   Qualified Code(s): I25.5 - Ischemic 

cardiomyopathy   


Assessment/Plan: 


Initial LVEF 35% 


LVEF on last echo 12/2016 of 45-50%


Pt is on ACEi and BB at baseline


AICD was placed in 2012


No evidence of CHF exacerbation 





Plan


Continue home medications


Rechecking ECHO today due to his parasthesia on presentation for admission 








(6) [place] disease


Assessment/Plan: 


Controlled on TID hydrocortisone








- Current Meds


Current Meds: 





 Current Medications











Generic Name Dose Route Start Last Admin





  Trade Name Freq  PRN Reason Stop Dose Admin


 


Aspirin  81 mg 05/09/17 09:00 05/09/17 08:31





  Ecotrin  PO   81 mg





  DAILY MARIA ELENA   Administration


 


Atorvastatin Calcium  40 mg 05/08/17 21:30 05/08/17 21:25





  Lipitor  PO   40 mg





  QPM MARIA ELENA   Administration


 


Bupropion HCl  450 mg 05/09/17 09:00 05/09/17 08:32





  Wellbutrin Xl  PO   450 mg





  DAILY MARIA ELENA   Administration


 


Carvedilol  12.5 mg 05/08/17 21:00 05/09/17 08:32





  Coreg  PO   12.5 mg





  BID MARIA ELENA   Administration


 


Duloxetine HCl  60 mg 05/08/17 21:00 05/09/17 08:31





  Cymbalta  PO   60 mg





  BID MARIA ELENA   Administration


 


Enoxaparin Sodium  40 mg 05/09/17 09:00 05/09/17 08:32





  Lovenox  SUBQ   Not Given





  DAILY MARIA ELENA   


 


Famotidine  40 mg 05/08/17 22:30 05/08/17 22:58





  Pepcid  PO   40 mg





  QPM MARIA ELENA   Administration


 


Gabapentin  300 mg 05/08/17 21:00 05/09/17 08:31





  Neurontin  PO   300 mg





  BID MARIA ELENA   Administration


 


Hydrocortisone  5 mg 05/08/17 21:00 05/08/17 21:11





  Cortef  PO   5 mg





  QDDINNER MARIA ELENA   Administration


 


Hydrocortisone  10 mg 05/09/17 08:00 05/09/17 07:53





  Cortef  PO   10 mg





  QDBREAKFAST MARIA ELENA   Administration


 


Lisinopril  20 mg 05/09/17 09:00 05/09/17 08:32





  Zestril  PO   20 mg





  DAILY MARIA ELENA   Administration


 


Metformin HCl  500 mg 05/09/17 08:00 05/09/17 07:54





  Glucophage  PO   Not Given





  BIDWM [facility]   


 


Multivitamins  1 tab 05/09/17 08:00 05/09/17 07:53





  Theragran  PO   1 tab





  DAILYWM MARIA ELENA   Administration


 


Oxycodone HCl  5 mg 05/08/17 21:00 05/09/17 08:32





  Roxicodone  PO   5 mg





  Q4HR MARIA ELENA   Administration


 


Pantoprazole Sodium  40 mg 05/08/17 21:00 05/09/17 06:15





  Protonix  PO   40 mg





  BIDAC MARIA ELENA   Administration


 


Polyethylene Glycol  17 gm 05/09/17 09:00 05/09/17 08:32





  Miralax  PO   Not Given





  DAILY MARIA ELENA   


 


Pregabalin  300 mg 05/08/17 21:00 05/09/17 08:32





  Lyrica  PO   300 mg





  BID MARIA ELENA   Administration


 


Sodium Chloride  10 ml 05/08/17 22:00 05/09/17 05:12





  Normal Saline Flush 0.9%  IVP   Not Given





  Q8HR MARIA ELENA   


 


Spironolactone  12.5 mg 05/09/17 09:00 05/09/17 08:31





  Aldactone  PO   12.5 mg





  DAILY MARIA ELENA   Administration














- Lab Result


[place] Diagrams: 


 05/08/17 14:55





 05/09/17 05:04





- EKG Results


EKG Comparison: Unchanged from prior EKG





- Diagnostic Imaging Results


Diagnostic Imaging Results: positive: Prelim report reviewed (No acute 

intracranial findings on CT or CTA)





- Additional Planning


My Orders: 





My Active Orders





05/09/17


Echo Complete w/Bubble Study [ECHO] Routine 











Plan Discussed with:: Patient


Time Spent: 15-30 minutes





Subjective





- Subjective


Patient Reports: Other (PT noted the numbness and tingling return in his hand 

and leg today.  He can not recall an exacerbating or relieving source.  He has 

no change in functionality.  HE has no other complaints)





Objective


Vital Signs: 





 Vital Signs - 24 hr











  05/08/17 05/08/17 05/09/17





  20:55 23:31 05:12


 


Temperature 36.7 C 36.6 C 36.6 C


 


Heart Rate [ 78 75 68





Brachial]   


 


Respiratory 16 17 17





Rate   


 


Blood Pressure 127/66 126/76 112/71





[Left Brachial   





artery]   


 


O2 Saturation 95 95 94














  05/09/17





  07:24


 


Temperature 36.6 C


 


Heart Rate [ 84





Brachial] 


 


Respiratory 





Rate 


 


Blood Pressure 136/75 H





[Left Brachial 





artery] 


 


O2 Saturation 95








 Oxygen











O2 Source                      Room air














I&O (Last 24 Hrs): 





 Intake and Output Totals x24h











 05/07/17 05/08/17 05/09/17





 23:59 23:59 23:59


 


Intake Total  620 1530


 


Balance  620 1530











General: Alert, Oriented x3, No acute distress


HEENT: PERRLA, EOMI


Neck: No JVD


Neuro: Alert, Non Focal, CN 2-12 Grossly Intact, Oriented Times 3


Cardiovascular: Regular rate, No murmurs


Respiratory: No respiratory distress, Breath sounds nml





- Results


Results: 





 Laboratory Results











WBC  10.2 x10^3/uL (4.8-10.8)   05/08/17  14:55    


 


RBC  4.10 10^6/uL (4.70-6.10)  L  05/08/17  14:55    


 


Hgb  12.8 g/dL (14.0-18.0)  L  05/08/17  14:55    


 


Hct  37.7 % (42.0-52.0)  L  05/08/17  14:55    


 


MCV  92.1 fL (80.0-94.0)   05/08/17  14:55    


 


MCH  31.3 pg (27.0-31.0)  H  05/08/17  14:55    


 


MCHC  34.0 g/dL (32.0-36.0)   05/08/17  14:55    


 


RDW  13.6 % (12.0-15.0)   05/08/17  14:55    


 


Plt Count  212 10^3/uL (130-450)   05/08/17  14:55    


 


MPV  9.6 fL (7.4-11.4)   05/08/17  14:55    


 


Neut #  7.5 10^3/uL (1.5-6.6)  H  05/08/17  14:55    


 


Lymph #  1.5 10^3/uL (1.5-3.5)   05/08/17  14:55    


 


Mono #  1.0 10^3/uL (0.0-1.0)   05/08/17  14:55    


 


Eos #  0.1 10^3/uL (0.0-0.7)   05/08/17  14:55    


 


Baso #  0.1 10^3/uL (0.0-0.1)   05/08/17  14:55    


 


Absolute Nucleated RBC  0.00 x10^3/uL  05/08/17  14:55    


 


Nucleated RBCs  0.0 /100WBC  05/08/17  14:55    


 


Sodium  136 mmol/L (135-145)   05/09/17  05:04    


 


Potassium  4.4 mmol/L (3.5-5.0)   05/09/17  05:04    


 


Chloride  99 mmol/L (101-111)  L  05/09/17  05:04    


 


Carbon Dioxide  31 mmol/L (21-32)   05/09/17  05:04    


 


Anion Gap  6.0  (6-13)   05/09/17  05:04    


 


BUN  17 mg/dL (6-20)   05/09/17  05:04    


 


Creatinine  0.8 mg/dL (0.6-1.2)   05/09/17  05:04    


 


Estimated GFR (MDRD)  100  (>89)   05/09/17  05:04    


 


Glucose  268 mg/dL ()  H  05/09/17  05:04    


 


Calcium  9.2 mg/dL (8.5-10.3)   05/09/17  05:04    


 


Total Bilirubin  0.4 mg/dL (0.2-1.0)   05/08/17  14:55    


 


AST  15 IU/L (10-42)   05/08/17  14:55    


 


ALT  24 IU/L (10-60)   05/08/17  14:55    


 


Alkaline Phosphatase  88 IU/L ()   05/08/17  14:55    


 


Total Protein  6.7 g/dL (6.7-8.2)   05/08/17  14:55    


 


Albumin  3.9 g/dL (3.2-5.5)   05/08/17  14:55    


 


Globulin  2.8 g/dL (2.1-4.2)   05/08/17  14:55    


 


Albumin/Globulin Ratio  1.4  (1.0-2.2)   05/08/17  14:55    


 


Lipase  22 U/L (22-51)   05/08/17  14:55    














- Procedures


Procedures: 





Procedures





EXPLOR TEND SHEATH-HAND (11/20/14)

## 2017-05-10 NOTE — CT REPORT
EXAM:

CT ANGIOGRAM HEAD AND NECK .

CT SCAN OF THE HEAD WITHOUT AND WITH CONTRAST.

 

EXAM DATE: 5/8/2017 05:46 PM.

 

CLINICAL HISTORY: L sided numbness.

 

COMPARISON: CT scan of the head 05/08/2017, 01/04/2017.

 

TECHNIQUE: Routine axial helical CTA imaging was performed from the aortic arch through the [place] of
 [place]. Routine axial helical CT imaging of the head was performed prior to and following intravenou
s contrast administration. Iodinated IV contrast: 100 cc Isovue-300. Reconstructions: Routine multipl
sirena 3D MIP reconstructions. NASCET Criteria are used for stenosis measurements.

 

In accordance with CT protocol optimization, one or more of the following dose reduction techniques w
ere utilized for this exam: automated exposure control, adjustment of mA and/or KV based on patient s
ize, or use of iterative reconstructive technique.

 

FINDINGS:

CT scan of the head without and with contrast:

No acute intracranial abnormality. No infarct, mass, hemorrhage, or abnormal enhancement.

The ventricles, sulci, and cisterns are unremarkable.

The skull is intact. Opacification is seen at the left frontoethmoidal recess. The mastoid air cells 
are clear.

 

CT angiogram of the extracranial cerebral circulation:

Mild tortuosity of the aortic arch is seen. Note is made of conjoined origin of the right brachioceph
alic and left common carotid arteries. Great vessels off the arch are patent.

 

Right Carotid: The common carotid, internal carotid, and external carotid arteries are widely patent.
 No dissection, significant atherosclerotic plaque, or calcification identified.

 

Left Carotid: The common carotid, internal carotid, and external carotid arteries are widely patent. 
No dissection, significant atherosclerotic plaque, or calcification identified.

 

Vertebrals: The vertebrobasilar system shows no stenosis, dissection, aneurysm, or significant athero
sclerotic disease. The left vertebral artery is dominant. The left vertebral artery forms the basilar
 artery. The right vertebral artery is small in caliber and terminates as the right PICA.

 

CT angiogram Intracranial Circulation: 

Normal. No stenoses or aneurysms of the visualized vessels.

The right A1 segment of the [place] is aplastic. Bilateral A2 segments are supplied from the left ICA thr
ough a patent A-comm. There is near azygos appearance to the N[place]AY arising from the left A2 segment. Sm
all right A2 segment is seen.

There is near fetal origin of the right PCA is continuation of the right P-comm. P1 segment off the b
asilar tip on the right is hypoplastic. The left P-comm is small in caliber but patent.

 

The dural sinuses are patent and enhance normally.

 

Other: The visualized lung apices are clear.

The muscle and fascial planes of the neck are unremarkable.

No lytic or blastic bony lesions are seen. No significant cervical spondylosis. Note is made of anter
ior subluxation at the TMJ bilaterally.

 

IMPRESSION: 

CT scan of the head without and with contrast:

1. Negative CT scan of the head without and with contrast. No acute abnormality.

 

CT angiogram of the neck:

1. Unremarkable CT angiogram of the extracranial cerebral circulation. No significant atherosclerotic
 change or stenosis. No dissection.

2. The left vertebral artery is dominant. The right vertebral artery is diffusely small in caliber.

 

CT angiogram of the head:

1. Unremarkable CT angiogram of the intracranial cerebral circulation. No aneurysm. No significant st
enosis.

2. Note is made of near isolated right cerebral circulation. The right ICA terminates as the MCA, wit
h right A1 segment aplastic. There is near fetal origin of the right PCA is continuation of the right
 P-comm.

3. The left vertebral artery is dominant forming the basilar artery.

 

[facility]

Referring Provider Line: [phone]

 

SITE ID: [id number]

## 2017-05-14 ENCOUNTER — HOSPITAL ENCOUNTER (EMERGENCY)
Dept: HOSPITAL 76 - ED | Age: 57
Discharge: HOME | End: 2017-05-14
Payer: MEDICARE

## 2017-05-14 ENCOUNTER — HOSPITAL ENCOUNTER (OUTPATIENT)
Dept: HOSPITAL 76 - EMS | Age: 57
Discharge: ANOTHER HOSPITAL | End: 2017-05-14
Attending: SURGERY
Payer: MEDICARE

## 2017-05-14 VITALS — DIASTOLIC BLOOD PRESSURE: 65 MMHG | SYSTOLIC BLOOD PRESSURE: 107 MMHG

## 2017-05-14 DIAGNOSIS — I10: ICD-10-CM

## 2017-05-14 DIAGNOSIS — Z95.810: ICD-10-CM

## 2017-05-14 DIAGNOSIS — I25.10: ICD-10-CM

## 2017-05-14 DIAGNOSIS — Z95.5: ICD-10-CM

## 2017-05-14 DIAGNOSIS — R06.02: Primary | ICD-10-CM

## 2017-05-14 DIAGNOSIS — J44.0: Primary | ICD-10-CM

## 2017-05-14 DIAGNOSIS — I25.2: ICD-10-CM

## 2017-05-14 DIAGNOSIS — E27.1: ICD-10-CM

## 2017-05-14 DIAGNOSIS — I25.5: ICD-10-CM

## 2017-05-14 DIAGNOSIS — J44.1: ICD-10-CM

## 2017-05-14 DIAGNOSIS — J20.9: ICD-10-CM

## 2017-05-14 DIAGNOSIS — Z79.4: ICD-10-CM

## 2017-05-14 DIAGNOSIS — Z87.891: ICD-10-CM

## 2017-05-14 DIAGNOSIS — E10.42: ICD-10-CM

## 2017-05-14 LAB
ALBUMIN/GLOB SERPL: 1.4 {RATIO} (ref 1–2.2)
ANION GAP SERPL CALCULATED.4IONS-SCNC: 10 MMOL/L (ref 6–13)
BASOPHILS NFR BLD AUTO: 0.1 10^3/UL (ref 0–0.1)
BASOPHILS NFR BLD AUTO: 0.9 %
BILIRUB BLD-MCNC: 0.4 MG/DL (ref 0.2–1)
BUN SERPL-MCNC: 22 MG/DL (ref 6–20)
CALCIUM UR-MCNC: 9.9 MG/DL (ref 8.5–10.3)
CHLORIDE SERPL-SCNC: 100 MMOL/L (ref 101–111)
CO2 SERPL-SCNC: 29 MMOL/L (ref 21–32)
CREAT SERPLBLD-SCNC: 1.1 MG/DL (ref 0.6–1.2)
EOSINOPHIL # BLD AUTO: 0.2 10^3/UL (ref 0–0.7)
EOSINOPHIL NFR BLD AUTO: 2.8 %
ERYTHROCYTE [DISTWIDTH] IN BLOOD BY AUTOMATED COUNT: 13.5 % (ref 12–15)
GFRSERPLBLD MDRD-ARVRAT: 69 ML/MIN/{1.73_M2} (ref 89–?)
GLOBULIN SER-MCNC: 3 G/DL (ref 2.1–4.2)
GLUCOSE SERPL-MCNC: 165 MG/DL (ref 70–100)
HCT VFR BLD AUTO: 37.6 % (ref 42–52)
HGB UR QL STRIP: 13.2 G/DL (ref 14–18)
LIPASE SERPL-CCNC: 11 U/L (ref 22–51)
LYMPHOCYTES # SPEC AUTO: 1.6 10^3/UL (ref 1.5–3.5)
LYMPHOCYTES NFR BLD AUTO: 18.7 %
MCH RBC QN AUTO: 31.8 PG (ref 27–31)
MCHC RBC AUTO-ENTMCNC: 35 G/DL (ref 32–36)
MCV RBC AUTO: 90.8 FL (ref 80–94)
MONOCYTES # BLD AUTO: 0.8 10^3/UL (ref 0–1)
MONOCYTES NFR BLD AUTO: 8.7 %
NEUTROPHILS # BLD AUTO: 6.1 10^3/UL (ref 1.5–6.6)
NEUTROPHILS # SNV AUTO: 8.8 X10^3/UL (ref 4.8–10.8)
NEUTROPHILS NFR BLD AUTO: 68.9 %
NRBC # BLD AUTO: 0 /100WBC
PDW BLD AUTO: 9.4 FL (ref 7.4–11.4)
POTASSIUM SERPL-SCNC: 4.3 MMOL/L (ref 3.5–5)
PROT SPEC-MCNC: 7.1 G/DL (ref 6.7–8.2)
RBC MAR: 4.14 10^6/UL (ref 4.7–6.1)
SODIUM SERPLBLD-SCNC: 139 MMOL/L (ref 135–145)
WBC # BLD: 8.8 X10^3/UL

## 2017-05-14 PROCEDURE — 36415 COLL VENOUS BLD VENIPUNCTURE: CPT

## 2017-05-14 PROCEDURE — 85025 COMPLETE CBC W/AUTO DIFF WBC: CPT

## 2017-05-14 PROCEDURE — 99284 EMERGENCY DEPT VISIT MOD MDM: CPT

## 2017-05-14 PROCEDURE — 83880 ASSAY OF NATRIURETIC PEPTIDE: CPT

## 2017-05-14 PROCEDURE — 94640 AIRWAY INHALATION TREATMENT: CPT

## 2017-05-14 PROCEDURE — 71020: CPT

## 2017-05-14 PROCEDURE — 80053 COMPREHEN METABOLIC PANEL: CPT

## 2017-05-14 PROCEDURE — 93005 ELECTROCARDIOGRAM TRACING: CPT

## 2017-05-14 PROCEDURE — 99283 EMERGENCY DEPT VISIT LOW MDM: CPT

## 2017-05-14 PROCEDURE — 84484 ASSAY OF TROPONIN QUANT: CPT

## 2017-05-14 PROCEDURE — 83690 ASSAY OF LIPASE: CPT

## 2017-05-14 PROCEDURE — 93010 ELECTROCARDIOGRAM REPORT: CPT

## 2017-05-14 NOTE — XRAY PRELIMINARY REPORT
Accession: [id number]

Exam: XR Chest 2 View PA/LAT

 

IMPRESSION: Left lower lobe atelectasis.

 

[facility]

 

SITE ID: [id number]

## 2017-05-14 NOTE — ED PHYSICIAN DOCUMENTATION
PD HPI DYSPNEA





- Stated complaint


Stated Complaint: DYSPNEA





- Chief complaint


Chief Complaint: Resp





- History obtained from


History obtained from: Patient





- History of Present Illness


Timing - onset: Other (This is a 56-year-old gentleman with history of diabetes 

since age 13, and coronary disease with ischemic cardiomyopathy and AICD in 

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

  He develops some chest congestion about 5 days ago which over the last 2-3 

days he developed more of a cough productive of clear sputum and some shortness 

of breath without any chest pain or pedal edema.  He saw his physician 

yesterday and was noted to have a pulse oximetry of 94% which improved a little 

bit after nebulizer and office and was prescribed Pro-air and doxycycline.  

Today he feels a little worse with some or shortness of breath and some 

dizziness when he is exerting himself.  He doesn't feel too bad overall though 

and says he only came by ambulance because his physician advised not to drive 

and he did not have alternate transportation.)





Review of Systems


Ten Systems: 10 systems reviewed and negative


Constitutional: denies: Fever, Chills


Nose: denies: Rhinorrhea / runny nose, Congestion


Cardiac: denies: Chest pain / pressure, Palpitations, Pedal edema, Calf pain


Respiratory: reports: Dyspnea, Cough, Wheezing.  denies: Hemoptysis


GI: denies: Abdominal Pain





PD PAST MEDICAL HISTORY





- Past Medical History


Cardiovascular: Hypertension, High cholesterol, Coronary artery disease, MI


Respiratory: Shortness of breath


Neuro: Peripheral neuropathy


Endocrine/Autoimmune: Type 1 diabetes, Other


GI: GERD, Chronic diarrhea


: Benign prostate hypertrophy, Other


HEENT: None


Psych: Depression, Anxiety, Panic attacks


Musculoskeletal: Osteoarthritis


Derm: None





- Past Surgical History


Past Surgical History: Yes


General: Colonoscopy, EGD


Ortho: Carpal Tunnel surgery, Other


Cardiovascular: Coronary stent, AICD, Cardiac catheterization


HEENT: Other





- Present Medications


Home Medications: 


 Ambulatory Orders











 Medication  Instructions  Recorded  Confirmed


 


Carvedilol 12.5 mg PO BID 11/11/14 05/09/17


 


Lisinopril 20 mg PO DAILY 11/11/14 05/08/17


 


Pantoprazole [Protonix] 40 mg PO BIDAC 11/11/14 05/08/17


 


Ranitidine HCl 300 mg PO QPM 11/11/14 05/08/17


 


Spironolactone 12.5 mg PO DAILY 11/11/14 05/08/17


 


Zolpidem Tartrate 10 mg PO QPM PRN 11/11/14 05/08/17


 


Multivitamin [Multi-Vitamin Daily] 1 each PO DAILY 11/19/14 05/08/17


 


oxyCODONE [Roxicodone] 5 mg PO Q4HR PRN 05/04/15 05/08/17


 


Aspirin [Aspirin EC] 81 mg PO DAILY 12/14/16 05/08/17


 


Duloxetine HCl [Cymbalta] 60 mg PO BID 12/14/16 05/08/17


 


Insulin Lispro [Humalog] 1 - 11 units SQ .SLIDING SCALE 12/14/16 05/08/17


 


Lidocaine Patch 5% [Lidoderm Patch] 1 - 3 patch TOP DAILY PRN 12/14/16 05/08/17


 


Pregabalin [Lyrica] 300 mg PO BID 12/14/16 05/08/17


 


Alprazolam 0.25 mg PO BID PRN 05/08/17 05/08/17


 


Bupropion HCl [Bupropion Xl] 450 mg PO DAILY 05/08/17 05/08/17


 


Diclofenac Sodium [Voltaren] 4 gm TOP QID PRN 05/08/17 05/08/17


 


Hydrocortisone 5 mg PO QDBREAKFAST 05/08/17 05/09/17


 


Hydrocortisone [Cortef] 5 mg PO QDDINNER 05/08/17 05/08/17


 


Nortriptyline HCl 20 mg PO 0800,1700 05/08/17 05/09/17


 


Tamsulosin [Flomax] 0.4 mg PO DAILY@1500 05/08/17 05/09/17


 


metFORMIN [Glucophage] 500 mg PO BIDWM 05/08/17 05/08/17


 


Atorvastatin [Lipitor] 80 mg PO QPM #90 tablet 05/09/17 


 


Gabapentin [Neurontin] 300 mg PO BID  capsule 05/09/17 


 


Hydrocortisone [Cortef] 5 mg PO QDLUNCH  tablet 05/09/17 


 


Beclomethasone 80 Mcg [Qvar 80] 2 puffs INH BID #1 inhaler 05/14/17 














- Allergies


Allergies/Adverse Reactions: 


 Allergies











Allergy/AdvReac Type Severity Reaction Status Date / Time


 


No Known Drug Allergies Allergy   Verified 05/08/17 12:40














- Social History


Does the pt smoke?: No


Smoking Status: Former smoker


Does the pt drink ETOH?: No


Does the pt have substance abuse?: No





- Immunizations


Immunizations are current?: Yes





- POLST


Patient has POLST: No





PD ED PE NORMAL





- Vitals


Vital signs reviewed: Yes





- General


General: Alert and oriented X 3, No acute distress





- HEENT


HEENT: PERRL, EOMI





- Neck


Neck: Supple, no meningeal sign, No bony TTP





- Cardiac


Cardiac: RRR, No murmur





- Respiratory


Respiratory: No respiratory distress, Other (Tight and wheezy throughout)





- Abdomen


Abdomen: Soft, Non tender





- Extremities


Extremities: No edema, No calf tenderness / cord





- Neuro


Neuro: Alert and oriented X 3, Normal speech





- Psych


Psych: Normal mood, Normal affect





Results





- Vitals


Vitals: 


 Vital Signs - 24 hr











  05/14/17 05/14/17 05/14/17





  14:32 15:05 15:36


 


Temperature 36.4 C L  36.6 C


 


Heart Rate 88 89 88


 


Respiratory 16 17 12





Rate   


 


Blood Pressure 109/56 L  107/65


 


O2 Saturation 93  93








 Oxygen











O2 Source []                   Room air


 


O2 Source                      Room air

















- EKG (time done)


  ** 1458


Rate: Rate (enter#) (86)


Rhythm: NSR


Axis: Normal


Intervals: Normal MT


QRS: Normal


Ischemia: Q waves (anterior)


Compare to prior EKG: Unchanged from prior EKG (no change from 5/14/17)


Computer interpretation: Agree with computer





- Labs


Labs: 


 Laboratory Tests











  05/14/17 05/14/17 05/14/17





  14:35 14:35 14:35


 


WBC  8.8  


 


RBC  4.14 L  


 


Hgb  13.2 L  


 


Hct  37.6 L  


 


MCV  90.8  


 


MCH  31.8 H  


 


MCHC  35.0  


 


RDW  13.5  


 


Plt Count  222  


 


MPV  9.4  


 


Neut #  6.1  


 


Lymph #  1.6  


 


Mono #  0.8  


 


Eos #  0.2  


 


Baso #  0.1  


 


Absolute Nucleated RBC  0.00  


 


Nucleated RBCs  0.0  


 


Sodium   139 


 


Potassium   4.3 


 


Chloride   100 L 


 


Carbon Dioxide   29 


 


Anion Gap   10.0 


 


BUN   22 H 


 


Creatinine   1.1 


 


Estimated GFR (MDRD)   69 L 


 


Glucose   165 H 


 


Calcium   9.9 


 


Total Bilirubin   0.4 


 


AST   19 


 


ALT   24 


 


Alkaline Phosphatase   73 


 


Troponin I    < 0.04


 


B-Natriuretic Peptide   


 


Total Protein   7.1 


 


Albumin   4.1 


 


Globulin   3.0 


 


Albumin/Globulin Ratio   1.4 


 


Lipase   11 L 














  05/14/17





  14:35


 


WBC 


 


RBC 


 


Hgb 


 


Hct 


 


MCV 


 


MCH 


 


MCHC 


 


RDW 


 


Plt Count 


 


MPV 


 


Neut # 


 


Lymph # 


 


Mono # 


 


Eos # 


 


Baso # 


 


Absolute Nucleated RBC 


 


Nucleated RBCs 


 


Sodium 


 


Potassium 


 


Chloride 


 


Carbon Dioxide 


 


Anion Gap 


 


BUN 


 


Creatinine 


 


Estimated GFR (MDRD) 


 


Glucose 


 


Calcium 


 


Total Bilirubin 


 


AST 


 


ALT 


 


Alkaline Phosphatase 


 


Troponin I 


 


B-Natriuretic Peptide  16


 


Total Protein 


 


Albumin 


 


Globulin 


 


Albumin/Globulin Ratio 


 


Lipase 














- Rads (name of study)


  ** 2v chest


Radiology: EMP read contemporaneously (LLL atalectasis)





PD MEDICAL DECISION MAKING





- ED course


ED course: 





He presents with apparent COPD exacerbation/bronchitis and is already on 

inhaled beta-agonist and oral antibiotics with appropriate coverage.  He still 

wheezing though.  We discussed potentially systemic steroid, but he has pretty 

brittle diabetes.  He is administered duo neb here as well as inhaled Pulmicort.





His labs are reassuring and CXR without infiltrate or fluid overload. Feeling 

better after nebs here.





Departure





- Departure


Disposition: 01 Home, Self Care


Clinical Impression: 


 Bronchitis





Type 1 diabetes mellitus


Qualifiers:


 Diabetes mellitus complication status: without complication Qualified Code(s): 

E10.9 - Type 1 diabetes mellitus without complications





Dyspnea


Qualifiers:


 Dyspnea type: unspecified Qualified Code(s): R06.00 - Dyspnea, unspecified


Condition: Good


Record reviewed to determine appropriate education?: Yes


Instructions:  ED Bronchitis Asthmatic


Prescriptions: 


Beclomethasone 80 Mcg [Qvar 80] 2 puffs INH BID #1 inhaler


Comments: 


Continue the pro-air and doxycycline that Dr Ellis gave you. Add the inhaled 

steroid twice a day (NOT AS NEEDED- USE THE PRO AIR AS NEEDED). 


It may still increase your blood sugars a bit, keep an eye on them. 


Return if worse.


Report to you doctor in 1-2 days.


Discharge Date/Time: 05/14/17 15:38

## 2017-05-14 NOTE — XRAY REPORT
EXAM:

CHEST RADIOGRAPHY

 

EXAM DATE: 5/14/2017 02:58 PM.

 

CLINICAL HISTORY: Dyspnea and cough.

 

COMPARISON: 12/14/2016.

 

TECHNIQUE: 2 views.

 

FINDINGS: 

Lungs/Pleura: There are linear opacities in the left lower lobe which appear similar. Developing cons
olidative process. Negative for pleural effusion and pneumothorax.

 

Mediastinum: Heart size is normal. Pacemaker defibrillator lead overlies right ventricle.

 

Other: None.

 

IMPRESSION: Left lower lobe atelectasis.

 

[facility]

Referring Provider Line: [phone]

 

SITE ID: [id number]

## 2017-05-17 ENCOUNTER — HOSPITAL ENCOUNTER (EMERGENCY)
Dept: HOSPITAL 76 - ED | Age: 57
LOS: 1 days | Discharge: HOME | End: 2017-05-18
Payer: MEDICARE

## 2017-05-17 DIAGNOSIS — E27.40: Primary | ICD-10-CM

## 2017-05-17 DIAGNOSIS — Z95.810: ICD-10-CM

## 2017-05-17 DIAGNOSIS — Z79.82: ICD-10-CM

## 2017-05-17 DIAGNOSIS — M19.90: ICD-10-CM

## 2017-05-17 DIAGNOSIS — K21.9: ICD-10-CM

## 2017-05-17 DIAGNOSIS — E10.42: ICD-10-CM

## 2017-05-17 DIAGNOSIS — I25.10: ICD-10-CM

## 2017-05-17 DIAGNOSIS — Z79.4: ICD-10-CM

## 2017-05-17 DIAGNOSIS — E78.00: ICD-10-CM

## 2017-05-17 DIAGNOSIS — Z79.84: ICD-10-CM

## 2017-05-17 DIAGNOSIS — N40.0: ICD-10-CM

## 2017-05-17 DIAGNOSIS — I10: ICD-10-CM

## 2017-05-17 DIAGNOSIS — Z87.891: ICD-10-CM

## 2017-05-17 DIAGNOSIS — I25.2: ICD-10-CM

## 2017-05-17 LAB
ALBUMIN/GLOB SERPL: 1.3 {RATIO} (ref 1–2.2)
ANION GAP SERPL CALCULATED.4IONS-SCNC: 10 MMOL/L (ref 6–13)
BASOPHILS NFR BLD AUTO: 0.1 10^3/UL (ref 0–0.1)
BASOPHILS NFR BLD AUTO: 0.6 %
BILIRUB BLD-MCNC: 0.6 MG/DL (ref 0.2–1)
BUN SERPL-MCNC: 42 MG/DL (ref 6–20)
CALCIUM UR-MCNC: 8.9 MG/DL (ref 8.5–10.3)
CHLORIDE SERPL-SCNC: 102 MMOL/L (ref 101–111)
CO2 SERPL-SCNC: 27 MMOL/L (ref 21–32)
CREAT SERPLBLD-SCNC: 2.1 MG/DL (ref 0.6–1.2)
EOSINOPHIL # BLD AUTO: 0.2 10^3/UL (ref 0–0.7)
EOSINOPHIL NFR BLD AUTO: 1.8 %
ERYTHROCYTE [DISTWIDTH] IN BLOOD BY AUTOMATED COUNT: 13.7 % (ref 12–15)
GFRSERPLBLD MDRD-ARVRAT: 33 ML/MIN/{1.73_M2} (ref 89–?)
GLOBULIN SER-MCNC: 2.9 G/DL (ref 2.1–4.2)
GLUCOSE SERPL-MCNC: 68 MG/DL (ref 70–100)
HCT VFR BLD AUTO: 38 % (ref 42–52)
HGB UR QL STRIP: 12.5 G/DL (ref 14–18)
LIPASE SERPL-CCNC: 15 U/L (ref 22–51)
LYMPHOCYTES # SPEC AUTO: 1.8 10^3/UL (ref 1.5–3.5)
LYMPHOCYTES NFR BLD AUTO: 15.5 %
MCH RBC QN AUTO: 30.5 PG (ref 27–31)
MCHC RBC AUTO-ENTMCNC: 32.9 G/DL (ref 32–36)
MCV RBC AUTO: 92.8 FL (ref 80–94)
MONOCYTES # BLD AUTO: 1.2 10^3/UL (ref 0–1)
MONOCYTES NFR BLD AUTO: 10.1 %
NEUTROPHILS # BLD AUTO: 8.5 10^3/UL (ref 1.5–6.6)
NEUTROPHILS # SNV AUTO: 11.8 X10^3/UL (ref 4.8–10.8)
NEUTROPHILS NFR BLD AUTO: 72 %
NRBC # BLD AUTO: 0 /100WBC
PDW BLD AUTO: 9.6 FL (ref 7.4–11.4)
POTASSIUM SERPL-SCNC: 4.5 MMOL/L (ref 3.5–5)
PROT SPEC-MCNC: 6.8 G/DL (ref 6.7–8.2)
RBC MAR: 4.09 10^6/UL (ref 4.7–6.1)
SODIUM SERPLBLD-SCNC: 139 MMOL/L (ref 135–145)
WBC # BLD: 11.8 X10^3/UL

## 2017-05-17 PROCEDURE — 96374 THER/PROPH/DIAG INJ IV PUSH: CPT

## 2017-05-17 PROCEDURE — 80053 COMPREHEN METABOLIC PANEL: CPT

## 2017-05-17 PROCEDURE — 96361 HYDRATE IV INFUSION ADD-ON: CPT

## 2017-05-17 PROCEDURE — 83690 ASSAY OF LIPASE: CPT

## 2017-05-17 PROCEDURE — 85025 COMPLETE CBC W/AUTO DIFF WBC: CPT

## 2017-05-17 PROCEDURE — 99284 EMERGENCY DEPT VISIT MOD MDM: CPT

## 2017-05-17 PROCEDURE — 36415 COLL VENOUS BLD VENIPUNCTURE: CPT

## 2017-05-18 VITALS — DIASTOLIC BLOOD PRESSURE: 63 MMHG | SYSTOLIC BLOOD PRESSURE: 115 MMHG

## 2017-05-18 NOTE — ED PHYSICIAN DOCUMENTATION
History of Present Illness





- Stated complaint


Stated Complaint: LOW BLOOD SUGAR





- Chief complaint


Chief Complaint: General





- History obtained from


History obtained from: Patient, Family





- History of Present Illness


Timing: Today


Pain level max: 0


Pain level now: 0


Improved by: no ameliorating factors


Worsened by: no exacerbating factors





- Additonal information


Additional information: 





patients son came home this evening to find that the patient was groggy, and 

had difficulty focusing his thoughts. Both patient and his son recognized these 

symptoms as consistent with previous episodes of hypo glycemia. patient is a 

diabetic, and uses a pump for sugar control.





Review of Systems


Constitutional: reports: Sweats.  denies: Fever, Chills


Eyes: denies: Loss of vision, Decreased vision


Cardiac: reports: Reviewed and negative


Respiratory: reports: Reviewed and negative


GI: reports: Diarrhea.  denies: Abdominal Pain, Nausea, Vomiting


: denies: Dysuria, Frequency





PD PAST MEDICAL HISTORY





- Past Medical History


Cardiovascular: Hypertension, High cholesterol, Coronary artery disease, MI


Respiratory: Shortness of breath


Neuro: Peripheral neuropathy


Endocrine/Autoimmune: Type 1 diabetes, Other


GI: GERD, Chronic diarrhea


: Benign prostate hypertrophy, Other


HEENT: None


Psych: Depression, Anxiety, Panic attacks


Musculoskeletal: Osteoarthritis


Derm: None





- Past Surgical History


Past Surgical History: Yes


General: Colonoscopy, EGD


Ortho: Carpal Tunnel surgery, Other


Cardiovascular: Coronary stent, AICD, Cardiac catheterization


HEENT: Other





- Present Medications


Home Medications: 


 Ambulatory Orders











 Medication  Instructions  Recorded  Confirmed


 


Carvedilol 12.5 mg PO BID 11/11/14 05/17/17


 


Lisinopril 20 mg PO DAILY 11/11/14 05/17/17


 


Pantoprazole [Protonix] 40 mg PO BIDAC 11/11/14 05/17/17


 


Ranitidine HCl 300 mg PO QPM 11/11/14 05/17/17


 


Spironolactone 12.5 mg PO DAILY 11/11/14 05/17/17


 


Zolpidem Tartrate 10 mg PO QPM PRN 11/11/14 05/17/17


 


Multivitamin [Multi-Vitamin Daily] 1 each PO DAILY 11/19/14 05/17/17


 


oxyCODONE [Roxicodone] 5 mg PO Q4HR PRN 05/04/15 05/17/17


 


Aspirin [Aspirin EC] 81 mg PO DAILY 12/14/16 05/17/17


 


Duloxetine HCl [Cymbalta] 60 mg PO BID 12/14/16 05/17/17


 


Insulin Lispro [Humalog] 1 - 11 units SQ .SLIDING SCALE 12/14/16 05/17/17


 


Lidocaine Patch 5% [Lidoderm Patch] 1 - 3 patch TOP DAILY PRN 12/14/16 05/17/17


 


Pregabalin [Lyrica] 300 mg PO BID 12/14/16 05/17/17


 


Alprazolam 0.25 mg PO BID PRN 05/08/17 05/17/17


 


Bupropion HCl [Bupropion Xl] 450 mg PO DAILY 05/08/17 05/17/17


 


Diclofenac Sodium [Voltaren] 4 gm TOP QID PRN 05/08/17 05/17/17


 


Hydrocortisone 5 mg PO QDBREAKFAST 05/08/17 05/17/17


 


Hydrocortisone [Cortef] 5 mg PO QDDINNER 05/08/17 05/17/17


 


Nortriptyline HCl 20 mg PO 0800,1700 05/08/17 05/17/17


 


Tamsulosin [Flomax] 0.4 mg PO DAILY@1500 05/08/17 05/17/17


 


metFORMIN [Glucophage] 500 mg PO BIDWM 05/08/17 05/17/17


 


Atorvastatin [Lipitor] 80 mg PO QPM #90 tablet 05/09/17 05/17/17


 


Gabapentin [Neurontin] 300 mg PO BID  capsule 05/09/17 05/17/17


 


Hydrocortisone [Cortef] 5 mg PO QDLUNCH  tablet 05/09/17 05/17/17


 


Beclomethasone 80 Mcg [Qvar 80] 2 puffs INH BID #1 inhaler 05/14/17 05/17/17














- Allergies


Allergies/Adverse Reactions: 


 Allergies











Allergy/AdvReac Type Severity Reaction Status Date / Time


 


No Known Drug Allergies Allergy   Verified 05/08/17 12:40














- Social History


Does the pt smoke?: No


Smoking Status: Former smoker


Does the pt drink ETOH?: No


Does the pt have substance abuse?: No





- Immunizations


Immunizations are current?: Yes





- POLST


Patient has POLST: No





PD ED PE NORMAL





- Vitals


Vital signs reviewed: Yes





- General


General: Alert and oriented X 3, No acute distress, Well developed/nourished





- HEENT


HEENT: PERRL, EOMI, Moist mucous membranes





- Neck


Neck: Supple, no meningeal sign





- Cardiac


Cardiac: RRR, No murmur, No gallop, No rub





- Respiratory


Respiratory: No respiratory distress, Clear bilaterally





- Derm


Derm: Normal color, Warm and dry





- Extremities


Extremities: No edema





- Neuro


Neuro: Alert and oriented X 3, CNs 2-12 intact, No motor deficit, No sensory 

deficit, Normal speech





- Psych


Psych: Normal mood, Normal affect





Results





- Vitals


Vitals: 


 Oxygen











O2 Source [With Activity]      Room air


 


O2 Source                      Room air

















- Labs


Labs: 


 Laboratory Tests











  05/17/17 05/17/17





  23:10 23:10


 


WBC  11.8 H 


 


RBC  4.09 L 


 


Hgb  12.5 L 


 


Hct  38.0 L 


 


MCV  92.8 


 


MCH  30.5 


 


MCHC  32.9 


 


RDW  13.7 


 


Plt Count  226 


 


MPV  9.6 


 


Neut #  8.5 H 


 


Lymph #  1.8 


 


Mono #  1.2 H 


 


Eos #  0.2 


 


Baso #  0.1 


 


Absolute Nucleated RBC  0.00 


 


Nucleated RBCs  0.0 


 


Sodium   139


 


Potassium   4.5


 


Chloride   102


 


Carbon Dioxide   27


 


Anion Gap   10.0


 


BUN   42 H


 


Creatinine   2.1 H


 


Estimated GFR (MDRD)   33 L


 


Glucose   68 L


 


Calcium   8.9


 


Total Bilirubin   0.6


 


AST   20


 


ALT   25


 


Alkaline Phosphatase   81


 


Total Protein   6.8


 


Albumin   3.9


 


Globulin   2.9


 


Albumin/Globulin Ratio   1.3


 


Lipase   15 L














PD MEDICAL DECISION MAKING





- ED course


Complexity details: reviewed old records, reviewed results, re-evaluated patient

, considered differential, d/w patient, d/w family


ED course: 





patient presents with low blood sugar, and shortly after arrival, developed 

hypotension. He had a similar presentation in December of last year, at which 

time he also had elevated BUN and creatinine, As he does on tonight's 

presentation. Patient responded very well to IV fluids, became normotensive, 

and remain so during remainder of his ED stay. His blood sugar also improved 

from double digits on initial presentation, into the 100 range, and no 200 

range at time of discharge. He was given a dose of steroids, a stress dose of 

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 I suspect the crisis was 

precipitated by diarrhea he has had for two days. Of note, on abdominal exam in 

emergency department, his findings are benign. he says that diarrheal episodes 

or not uncommon for him due to collagenous colitis.  He was asymptomatic at 

time of discharge.





Departure





- Departure


Disposition: 01 Home, Self Care


Clinical Impression: 


 Adrenal insufficiency


Condition: Good


Instructions:  ED Hypotension All Causes, ED Diabetes Hypoglycemia Insulin React


Discharge Date/Time: 05/18/17 03:36

## 2017-05-24 ENCOUNTER — HOSPITAL ENCOUNTER (EMERGENCY)
Dept: HOSPITAL 76 - ED | Age: 57
Discharge: HOME | End: 2017-05-24
Payer: MEDICARE

## 2017-05-24 ENCOUNTER — HOSPITAL ENCOUNTER (OUTPATIENT)
Dept: HOSPITAL 76 - EMS | Age: 57
Discharge: ANOTHER HOSPITAL | End: 2017-05-24
Attending: SURGERY
Payer: MEDICARE

## 2017-05-24 VITALS — DIASTOLIC BLOOD PRESSURE: 74 MMHG | SYSTOLIC BLOOD PRESSURE: 118 MMHG

## 2017-05-24 DIAGNOSIS — Z79.82: ICD-10-CM

## 2017-05-24 DIAGNOSIS — K21.9: ICD-10-CM

## 2017-05-24 DIAGNOSIS — M79.651: Primary | ICD-10-CM

## 2017-05-24 DIAGNOSIS — I10: ICD-10-CM

## 2017-05-24 DIAGNOSIS — E10.649: Primary | ICD-10-CM

## 2017-05-24 DIAGNOSIS — I25.10: ICD-10-CM

## 2017-05-24 DIAGNOSIS — N40.0: ICD-10-CM

## 2017-05-24 DIAGNOSIS — Z79.4: ICD-10-CM

## 2017-05-24 DIAGNOSIS — Z96.41: ICD-10-CM

## 2017-05-24 DIAGNOSIS — I25.2: ICD-10-CM

## 2017-05-24 DIAGNOSIS — Z79.84: ICD-10-CM

## 2017-05-24 DIAGNOSIS — M19.90: ICD-10-CM

## 2017-05-24 DIAGNOSIS — Z87.891: ICD-10-CM

## 2017-05-24 DIAGNOSIS — E10.42: ICD-10-CM

## 2017-05-24 DIAGNOSIS — E78.00: ICD-10-CM
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
## 2017-05-24 LAB
ALBUMIN/GLOB SERPL: 1.4 {RATIO} (ref 1–2.2)
ANION GAP SERPL CALCULATED.4IONS-SCNC: 8 MMOL/L (ref 6–13)
BASOPHILS NFR BLD AUTO: 0.1 10^3/UL (ref 0–0.1)
BASOPHILS NFR BLD AUTO: 0.8 %
BILIRUB BLD-MCNC: 0.5 MG/DL (ref 0.2–1)
BUN SERPL-MCNC: 27 MG/DL (ref 6–20)
CALCIUM UR-MCNC: 8.5 MG/DL (ref 8.5–10.3)
CHLORIDE SERPL-SCNC: 101 MMOL/L (ref 101–111)
CO2 SERPL-SCNC: 26 MMOL/L (ref 21–32)
CREAT SERPLBLD-SCNC: 1.9 MG/DL (ref 0.6–1.2)
EOSINOPHIL # BLD AUTO: 0.1 10^3/UL (ref 0–0.7)
EOSINOPHIL NFR BLD AUTO: 1.3 %
ERYTHROCYTE [DISTWIDTH] IN BLOOD BY AUTOMATED COUNT: 13.7 % (ref 12–15)
GFRSERPLBLD MDRD-ARVRAT: 37 ML/MIN/{1.73_M2} (ref 89–?)
GLOBULIN SER-MCNC: 2.7 G/DL (ref 2.1–4.2)
GLUCOSE SERPL-MCNC: 279 MG/DL (ref 70–100)
HCT VFR BLD AUTO: 35.9 % (ref 42–52)
HGB UR QL STRIP: 11.9 G/DL (ref 14–18)
LIPASE SERPL-CCNC: 14 U/L (ref 22–51)
LYMPHOCYTES # SPEC AUTO: 1.3 10^3/UL (ref 1.5–3.5)
LYMPHOCYTES NFR BLD AUTO: 13 %
MCH RBC QN AUTO: 30.9 PG (ref 27–31)
MCHC RBC AUTO-ENTMCNC: 33.1 G/DL (ref 32–36)
MCV RBC AUTO: 93.4 FL (ref 80–94)
MONOCYTES # BLD AUTO: 1.1 10^3/UL (ref 0–1)
MONOCYTES NFR BLD AUTO: 11.5 %
NEUTROPHILS # BLD AUTO: 7.3 10^3/UL (ref 1.5–6.6)
NEUTROPHILS # SNV AUTO: 9.9 X10^3/UL (ref 4.8–10.8)
NEUTROPHILS NFR BLD AUTO: 73.4 %
NRBC # BLD AUTO: 0.1 /100WBC
PDW BLD AUTO: 9.6 FL (ref 7.4–11.4)
POTASSIUM SERPL-SCNC: 4.9 MMOL/L (ref 3.5–5)
PROT SPEC-MCNC: 6.5 G/DL (ref 6.7–8.2)
RBC MAR: 3.85 10^6/UL (ref 4.7–6.1)
SODIUM SERPLBLD-SCNC: 135 MMOL/L (ref 135–145)
WBC # BLD: 9.9 X10^3/UL

## 2017-05-24 PROCEDURE — 80053 COMPREHEN METABOLIC PANEL: CPT

## 2017-05-24 PROCEDURE — 85025 COMPLETE CBC W/AUTO DIFF WBC: CPT

## 2017-05-24 PROCEDURE — 36415 COLL VENOUS BLD VENIPUNCTURE: CPT

## 2017-05-24 PROCEDURE — 99283 EMERGENCY DEPT VISIT LOW MDM: CPT

## 2017-05-24 PROCEDURE — 73552 X-RAY EXAM OF FEMUR 2/>: CPT

## 2017-05-24 PROCEDURE — 82550 ASSAY OF CK (CPK): CPT

## 2017-05-24 PROCEDURE — 83690 ASSAY OF LIPASE: CPT

## 2017-05-24 NOTE — ED PHYSICIAN DOCUMENTATION
PD HPI LOWER EXT INJURY





- Stated complaint


Stated Complaint: FALL/LEG PAIN





- Chief complaint


Chief Complaint: General





- History obtained from


History obtained from: Patient





- History of Present Illness


PD HPI LOW EXT INJURY LOCATION: Right, Upper leg


Type of injury: Fall


Where injury occurred: Home


Timing - onset: How many hours ago (2)


Timing - details: Abrupt onset, Still present


Pain level max: 6


Improved by: Rest, Immobilization


Worsened by: Moving, Palpating


Associated symptoms: No: Weakness, Numbness, Tingling, Swelling, Discolored


Contributing factors: No: Anticoagulated


Similar symptoms before: Has not had sx before


Recently seen: Not recently seen





- Additional information


Additional information: 





Patient is a 56 year old male with a history of diabetes with an indwelling 

pump who is presenting to the emergency department for leg pain.  According to 

patient, family and ems, patient sat down for dinner but never ate, his son 

came over about 2 hours later and found the patient on the floor.  His sugar 

was 66.  He turned off his pump and fed him glucose pills and peanut butter 

sandwich.  The called ems.  When ems arrived they treated the patient was D50.  

Recheck on the accucheck was 220.  patient stated that he had pain with 

ambulation so he wanted to come get checked out.  





Review of Systems


Constitutional: reports: Sweats.  denies: Fever


Eyes: denies: Loss of vision, Photophobia


Ears: denies: Ear pain, Drainage/discharge


Nose: denies: Congestion, Sinus pressure / pain


Throat: denies: Sore throat


Cardiac: denies: Chest pain / pressure, Palpitations


Respiratory: denies: Cough, Wheezing


GI: denies: Nausea, Vomiting


Skin: denies: Rash, Lesions, Abrasion (s), Laceration (s)


Musculoskeletal: reports: Extremity pain.  denies: Joint pain, Extremity 

swelling


Neurologic: denies: Generalized weakness, Focal weakness, Numbness


Immunocompromised: denies: Immunocompromised





PD PAST MEDICAL HISTORY





- Past Medical History


Cardiovascular: Hypertension, High cholesterol, Coronary artery disease, MI


Respiratory: Shortness of breath


Neuro: Peripheral neuropathy


Endocrine/Autoimmune: Type 1 diabetes, Other


GI: GERD, Chronic diarrhea


: Benign prostate hypertrophy, Other


HEENT: None


Psych: Depression, Anxiety, Panic attacks


Musculoskeletal: Osteoarthritis


Derm: None





- Past Surgical History


Past Surgical History: Yes


General: Colonoscopy, EGD


Ortho: Carpal Tunnel surgery, Other


Cardiovascular: Coronary stent, AICD, Cardiac catheterization


HEENT: Other





- Present Medications


Home Medications: 


 Ambulatory Orders











 Medication  Instructions  Recorded  Confirmed


 


Carvedilol 12.5 mg PO BID 11/11/14 05/24/17


 


Lisinopril 20 mg PO DAILY 11/11/14 05/24/17


 


Pantoprazole [Protonix] 40 mg PO BIDAC 11/11/14 05/24/17


 


Ranitidine HCl 300 mg PO QPM 11/11/14 05/24/17


 


Spironolactone 12.5 mg PO DAILY 11/11/14 05/24/17


 


Zolpidem Tartrate 10 mg PO QPM PRN 11/11/14 05/24/17


 


Multivitamin [Multi-Vitamin Daily] 1 each PO DAILY 11/19/14 05/24/17


 


oxyCODONE [Roxicodone] 5 mg PO Q4HR PRN 05/04/15 05/24/17


 


Aspirin [Aspirin EC] 81 mg PO DAILY 12/14/16 05/24/17


 


Duloxetine HCl [Cymbalta] 60 mg PO BID 12/14/16 05/24/17


 


Insulin Lispro [Humalog] 1 - 11 units SQ .SLIDING SCALE 12/14/16 05/24/17


 


Lidocaine Patch 5% [Lidoderm Patch] 1 - 3 patch TOP DAILY PRN 12/14/16 05/24/17


 


Pregabalin [Lyrica] 300 mg PO BID 12/14/16 05/24/17


 


Alprazolam 0.25 mg PO BID PRN 05/08/17 05/24/17


 


Bupropion HCl [Bupropion Xl] 450 mg PO DAILY 05/08/17 05/24/17


 


Diclofenac Sodium [Voltaren] 4 gm TOP QID PRN 05/08/17 05/24/17


 


Hydrocortisone 5 mg PO QDBREAKFAST 05/08/17 05/24/17


 


Hydrocortisone [Cortef] 5 mg PO QDDINNER 05/08/17 05/24/17


 


Nortriptyline HCl 20 mg PO 0800,1700 05/08/17 05/24/17


 


Tamsulosin [Flomax] 0.4 mg PO DAILY@1500 05/08/17 05/24/17


 


metFORMIN [Glucophage] 500 mg PO BIDWM 05/08/17 05/24/17


 


Atorvastatin [Lipitor] 80 mg PO QPM #90 tablet 05/09/17 05/24/17


 


Gabapentin [Neurontin] 300 mg PO BID  capsule 05/09/17 05/24/17


 


Hydrocortisone [Cortef] 5 mg PO QDLUNCH  tablet 05/09/17 05/24/17


 


Beclomethasone 80 Mcg [Qvar 80] 2 puffs INH BID #1 inhaler 05/14/17 05/24/17














- Allergies


Allergies/Adverse Reactions: 


 Allergies











Allergy/AdvReac Type Severity Reaction Status Date / Time


 


No Known Drug Allergies Allergy   Verified 05/08/17 12:40














- Social History


Does the pt smoke?: No


Smoking Status: Former smoker


Does the pt drink ETOH?: No


Does the pt have substance abuse?: No





- Immunizations


Immunizations are current?: Yes





- POLST


Patient has POLST: No





PD ED PE NORMAL





- Vitals


Vital signs reviewed: Yes





- General


General: Alert and oriented X 3, No acute distress, Well developed/nourished





- HEENT


HEENT: Atraumatic, PERRL, Pharynx benign





- Neck


Neck: No bony TTP





- Cardiac


Cardiac: RRR, No murmur





- Respiratory


Respiratory: No respiratory distress, Clear bilaterally





- Abdomen


Abdomen: Soft, Non tender, Non distended





- Derm


Derm: Normal color, Warm and dry





- Neuro


Neuro: Alert and oriented X 3, CNs 2-12 intact, No motor deficit, No sensory 

deficit, Normal speech





- Psych


Psych: Normal mood, Normal affect





PD ED PE EXPANDED





- Extremities


Extremities: Right thigh (tenderness to palpation of right thigh, no ecchymosis

, no deformity, no abrasion)





Results





- Vitals


Vitals: 


 Vital Signs - 24 hr











  05/24/17





  01:47


 


Temperature 35.9 C L


 


Heart Rate 98


 


Respiratory 16





Rate 


 


Blood Pressure 102/63


 


O2 Saturation 97








 Oxygen











O2 Source []                   Room air


 


O2 Source                      Room air

















- Labs


Labs: 


 Laboratory Tests











  05/24/17 05/24/17





  02:45 02:45


 


WBC  9.9 


 


RBC  3.85 L 


 


Hgb  11.9 L 


 


Hct  35.9 L 


 


MCV  93.4 


 


MCH  30.9 


 


MCHC  33.1 


 


RDW  13.7 


 


Plt Count  208 


 


MPV  9.6 


 


Neut #  7.3 H 


 


Lymph #  1.3 L 


 


Mono #  1.1 H 


 


Eos #  0.1 


 


Baso #  0.1 


 


Absolute Nucleated RBC  0.01 


 


Nucleated RBCs  0.1 


 


Sodium   135


 


Potassium   4.9


 


Chloride   101


 


Carbon Dioxide   26


 


Anion Gap   8.0


 


BUN   27 H


 


Creatinine   1.9 H


 


Estimated GFR (MDRD)   37 L


 


Glucose   279 H


 


Calcium   8.5


 


Total Bilirubin   0.5


 


AST   27


 


ALT   28


 


Alkaline Phosphatase   65


 


Total Creatine Kinase   175


 


Total Protein   6.5 L


 


Albumin   3.8


 


Globulin   2.7


 


Albumin/Globulin Ratio   1.4


 


Lipase   14 L














- Rads (name of study)


  ** right femur


Radiology: EMP read contemporaneously (no acute fracture or dislocation)





PD MEDICAL DECISION MAKING





- ED course


Complexity details: reviewed old records, reviewed results, re-evaluated patient

, considered differential, d/w patient, d/w family


ED course: 





Patient was seen and examined at bedside.  Patient was well appearing and in no 

acute distress.  IV access was gained and patient was sent for imaging.  Due to 

unknown down time, and no signs of trauma, labs were drawn.  imaging was within 

normal limits.  Patient's blood work was within normal limits.  Patient's son 

was at bedside.  patient required no further work up and was stable for 

discharge with outpatient follow up.  





Departure





- Departure


Disposition: 01 Home, Self Care


Clinical Impression: 


 Hypoglycemia


Condition: Good


Instructions:  ED Diabetes Hypoglycemia Insulin React


Follow-Up: 


Leo Ray MD [Primary Care Provider] - 


Comments: 


Your diagnostics today were within normal limits.  You have had multiple visits 

now for hypoglycemia.  It is important that you follow up with your pmd this 

week.  A discussion needs to be had concerning your glycemic control.  You may 

return to the emergency department at any time for new, worsening or 

uncontrollable symptoms.

## 2017-05-24 NOTE — XRAY REPORT
EXAM:

RIGHT FEMUR RADIOGRAPHY

 

EXAM DATE: 5/24/2017 02:39 AM.

 

CLINICAL HISTORY: Fall, right sided femur pain.

 

COMPARISON: None.

 

TECHNIQUE: 2 views.

 

FINDINGS: 

Bones: Benign periosteal reaction along the shaft of the right femur. No fracture or bone lesion.

 

Joints: The visualized hip and knee joints are normal. No effusions.

 

Soft Tissues: no soft tissue swelling.

 

IMPRESSION: No acute osseous abnormality demonstrated.  

 

[facility]

Referring Provider Line: [phone]

 

SITE ID: [id number]

## 2017-05-24 NOTE — XRAY PRELIMINARY REPORT
Accession: [id number]

Exam: XR Femur 2V RT

 

IMPRESSION: No acute osseous abnormality demonstrated.  

 

[facility]

 

SITE ID: [id number]

## 2017-06-14 ENCOUNTER — HOSPITAL ENCOUNTER (INPATIENT)
Dept: HOSPITAL 76 - ED | Age: 57
LOS: 3 days | Discharge: HOME | DRG: 683 | End: 2017-06-17
Attending: NURSE PRACTITIONER | Admitting: NURSE PRACTITIONER
Payer: MEDICARE

## 2017-06-14 ENCOUNTER — HOSPITAL ENCOUNTER (OUTPATIENT)
Dept: HOSPITAL 76 - EMS | Age: 57
Discharge: ANOTHER HOSPITAL | End: 2017-06-14
Attending: SURGERY
Payer: MEDICARE

## 2017-06-14 DIAGNOSIS — I10: ICD-10-CM

## 2017-06-14 DIAGNOSIS — I25.5: ICD-10-CM

## 2017-06-14 DIAGNOSIS — Z79.82: ICD-10-CM

## 2017-06-14 DIAGNOSIS — K52.9: ICD-10-CM

## 2017-06-14 DIAGNOSIS — K58.0: ICD-10-CM

## 2017-06-14 DIAGNOSIS — Z95.5: ICD-10-CM

## 2017-06-14 DIAGNOSIS — I25.2: ICD-10-CM

## 2017-06-14 DIAGNOSIS — N17.9: Primary | ICD-10-CM
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
## 2017-06-14 DIAGNOSIS — Z79.4: ICD-10-CM

## 2017-06-14 DIAGNOSIS — Z79.51: ICD-10-CM

## 2017-06-14 DIAGNOSIS — R51: ICD-10-CM

## 2017-06-14 DIAGNOSIS — Z79.899: ICD-10-CM

## 2017-06-14 DIAGNOSIS — S50.12XA: ICD-10-CM

## 2017-06-14 DIAGNOSIS — N40.0: ICD-10-CM

## 2017-06-14 DIAGNOSIS — M25.422: ICD-10-CM

## 2017-06-14 DIAGNOSIS — E10.319: ICD-10-CM

## 2017-06-14 DIAGNOSIS — E87.5: ICD-10-CM

## 2017-06-14 DIAGNOSIS — M62.82: ICD-10-CM

## 2017-06-14 DIAGNOSIS — Z87.891: ICD-10-CM

## 2017-06-14 DIAGNOSIS — Z79.891: ICD-10-CM

## 2017-06-14 DIAGNOSIS — I25.10: ICD-10-CM

## 2017-06-14 DIAGNOSIS — M25.522: ICD-10-CM

## 2017-06-14 DIAGNOSIS — T44.7X5A: ICD-10-CM

## 2017-06-14 DIAGNOSIS — T50.0X5A: ICD-10-CM

## 2017-06-14 DIAGNOSIS — T46.4X5A: ICD-10-CM

## 2017-06-14 DIAGNOSIS — I50.20: ICD-10-CM

## 2017-06-14 DIAGNOSIS — Z95.810: ICD-10-CM

## 2017-06-14 DIAGNOSIS — E10.65: ICD-10-CM

## 2017-06-14 DIAGNOSIS — Z91.81: ICD-10-CM

## 2017-06-14 DIAGNOSIS — M15.9: ICD-10-CM
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
## 2017-06-14 DIAGNOSIS — Z79.1: ICD-10-CM

## 2017-06-14 DIAGNOSIS — F41.0: ICD-10-CM

## 2017-06-14 DIAGNOSIS — G89.29: ICD-10-CM

## 2017-06-14 DIAGNOSIS — E66.9: ICD-10-CM

## 2017-06-14 DIAGNOSIS — R39.11: ICD-10-CM

## 2017-06-14 DIAGNOSIS — E10.42: ICD-10-CM

## 2017-06-14 DIAGNOSIS — S00.83XA: ICD-10-CM

## 2017-06-14 DIAGNOSIS — E86.0: ICD-10-CM

## 2017-06-14 DIAGNOSIS — Z79.52: ICD-10-CM

## 2017-06-14 DIAGNOSIS — F32.9: ICD-10-CM

## 2017-06-14 DIAGNOSIS — N40.1: ICD-10-CM

## 2017-06-14 DIAGNOSIS — I11.0: ICD-10-CM

## 2017-06-14 DIAGNOSIS — Z96.41: ICD-10-CM

## 2017-06-14 DIAGNOSIS — S40.012A: ICD-10-CM

## 2017-06-14 DIAGNOSIS — E10.649: ICD-10-CM

## 2017-06-14 DIAGNOSIS — K21.9: ICD-10-CM

## 2017-06-14 DIAGNOSIS — E78.5: ICD-10-CM

## 2017-06-14 DIAGNOSIS — E11.641: Primary | ICD-10-CM

## 2017-06-14 LAB
ALBUMIN/GLOB SERPL: 1.2 {RATIO} (ref 1–2.2)
ANION GAP SERPL CALCULATED.4IONS-SCNC: 11 MMOL/L (ref 6–13)
BASOPHILS NFR BLD AUTO: 0 10^3/UL (ref 0–0.1)
BASOPHILS NFR BLD AUTO: 0.4 %
BILIRUB BLD-MCNC: 0.7 MG/DL (ref 0.2–1)
BUN SERPL-MCNC: 49 MG/DL (ref 6–20)
CALCIUM UR-MCNC: 8.8 MG/DL (ref 8.5–10.3)
CHLORIDE SERPL-SCNC: 100 MMOL/L (ref 101–111)
CK MB SERPL-MCNC: 19.8 NG/ML (ref 0.6–6.3)
CO2 SERPL-SCNC: 23 MMOL/L (ref 21–32)
CREAT SERPLBLD-SCNC: 5.1 MG/DL (ref 0.6–1.2)
CUL URINE ADD CHARGE: (no result)
EOSINOPHIL # BLD AUTO: 0.1 10^3/UL (ref 0–0.7)
EOSINOPHIL NFR BLD AUTO: 0.8 %
ERYTHROCYTE [DISTWIDTH] IN BLOOD BY AUTOMATED COUNT: 13.7 % (ref 12–15)
EST. AVERAGE GLUCOSE BLD GHB EST-MCNC: 209 MG/DL (ref 70–100)
GFRSERPLBLD MDRD-ARVRAT: 12 ML/MIN/{1.73_M2} (ref 89–?)
GLOBULIN SER-MCNC: 3.3 G/DL (ref 2.1–4.2)
GLUCOSE SERPL-MCNC: 119 MG/DL (ref 70–100)
HBA1C BLD-MCNC: 0.97 G/DL
HCT VFR BLD AUTO: 36.4 % (ref 42–52)
HGB UR QL STRIP: 12.3 G/DL (ref 14–18)
LIPASE SERPL-CCNC: 11 U/L (ref 22–51)
LYMPHOCYTES # SPEC AUTO: 0.8 10^3/UL (ref 1.5–3.5)
LYMPHOCYTES NFR BLD AUTO: 8.9 %
MCH RBC QN AUTO: 31.2 PG (ref 27–31)
MCHC RBC AUTO-ENTMCNC: 33.9 G/DL (ref 32–36)
MCV RBC AUTO: 92 FL (ref 80–94)
MONOCYTES # BLD AUTO: 0.9 10^3/UL (ref 0–1)
MONOCYTES NFR BLD AUTO: 9.8 %
NEUTROPHILS # BLD AUTO: 7.3 10^3/UL (ref 1.5–6.6)
NEUTROPHILS # SNV AUTO: 9.2 X10^3/UL (ref 4.8–10.8)
NEUTROPHILS NFR BLD AUTO: 80.1 %
NRBC # BLD AUTO: 0 /100WBC
PDW BLD AUTO: 9.7 FL (ref 7.4–11.4)
PH UR STRIP.AUTO: 5.5 PH (ref 5–7.5)
POTASSIUM SERPL-SCNC: 4.5 MMOL/L (ref 3.5–5)
PROT SPEC-MCNC: 7.2 G/DL (ref 6.7–8.2)
RBC MAR: 3.96 10^6/UL (ref 4.7–6.1)
SODIUM SERPLBLD-SCNC: 134 MMOL/L (ref 135–145)
SP GR UR STRIP.AUTO: 1.02 (ref 1–1.03)
TROPONIN I SERPL-MCNC: < 0.04 NG/ML (ref ?–0.49)
UR CULTURE IF IND: (no result)
UROBILINOGEN UR STRIP.AUTO-MCNC: NEGATIVE MG/DL
WBC # BLD: 9.2 X10^3/UL
WBC # UR MANUAL: (no result) /HPF (ref 0–3)

## 2017-06-14 PROCEDURE — 96361 HYDRATE IV INFUSION ADD-ON: CPT

## 2017-06-14 PROCEDURE — 82009 KETONE BODYS QUAL: CPT

## 2017-06-14 PROCEDURE — 84132 ASSAY OF SERUM POTASSIUM: CPT

## 2017-06-14 PROCEDURE — 99284 EMERGENCY DEPT VISIT MOD MDM: CPT

## 2017-06-14 PROCEDURE — 87086 URINE CULTURE/COLONY COUNT: CPT

## 2017-06-14 PROCEDURE — 93010 ELECTROCARDIOGRAM REPORT: CPT

## 2017-06-14 PROCEDURE — 80053 COMPREHEN METABOLIC PANEL: CPT

## 2017-06-14 PROCEDURE — 36415 COLL VENOUS BLD VENIPUNCTURE: CPT

## 2017-06-14 PROCEDURE — 87493 C DIFF AMPLIFIED PROBE: CPT

## 2017-06-14 PROCEDURE — 81003 URINALYSIS AUTO W/O SCOPE: CPT

## 2017-06-14 PROCEDURE — 96374 THER/PROPH/DIAG INJ IV PUSH: CPT

## 2017-06-14 PROCEDURE — 82553 CREATINE MB FRACTION: CPT

## 2017-06-14 PROCEDURE — 87045 FECES CULTURE AEROBIC BACT: CPT

## 2017-06-14 PROCEDURE — 84484 ASSAY OF TROPONIN QUANT: CPT

## 2017-06-14 PROCEDURE — 82570 ASSAY OF URINE CREATININE: CPT

## 2017-06-14 PROCEDURE — 70450 CT HEAD/BRAIN W/O DYE: CPT

## 2017-06-14 PROCEDURE — 71010: CPT

## 2017-06-14 PROCEDURE — 82550 ASSAY OF CK (CPK): CPT

## 2017-06-14 PROCEDURE — 93005 ELECTROCARDIOGRAM TRACING: CPT

## 2017-06-14 PROCEDURE — 81001 URINALYSIS AUTO W/SCOPE: CPT

## 2017-06-14 PROCEDURE — 81002 URINALYSIS NONAUTO W/O SCOPE: CPT

## 2017-06-14 PROCEDURE — 94640 AIRWAY INHALATION TREATMENT: CPT

## 2017-06-14 PROCEDURE — 83880 ASSAY OF NATRIURETIC PEPTIDE: CPT

## 2017-06-14 PROCEDURE — 83036 HEMOGLOBIN GLYCOSYLATED A1C: CPT

## 2017-06-14 PROCEDURE — 85025 COMPLETE CBC W/AUTO DIFF WBC: CPT

## 2017-06-14 PROCEDURE — 99285 EMERGENCY DEPT VISIT HI MDM: CPT

## 2017-06-14 PROCEDURE — 84300 ASSAY OF URINE SODIUM: CPT

## 2017-06-14 PROCEDURE — 87046 STOOL CULTR AEROBIC BACT EA: CPT

## 2017-06-14 PROCEDURE — 83690 ASSAY OF LIPASE: CPT

## 2017-06-14 PROCEDURE — 76775 US EXAM ABDO BACK WALL LIM: CPT

## 2017-06-14 PROCEDURE — 80048 BASIC METABOLIC PNL TOTAL CA: CPT

## 2017-06-14 RX ADMIN — SODIUM CHLORIDE SCH MLS/HR: 9 INJECTION, SOLUTION INTRAVENOUS at 17:00

## 2017-06-14 RX ADMIN — INSULIN ASPART SCH UNIT: 100 INJECTION, SOLUTION INTRAVENOUS; SUBCUTANEOUS at 21:03

## 2017-06-14 RX ADMIN — PREGABALIN SCH MG: 100 CAPSULE ORAL at 21:09

## 2017-06-14 RX ADMIN — SODIUM CHLORIDE, PRESERVATIVE FREE SCH: 5 INJECTION INTRAVENOUS at 21:10

## 2017-06-14 RX ADMIN — OXYCODONE SCH: 5 TABLET ORAL at 23:49

## 2017-06-14 RX ADMIN — PANTOPRAZOLE SODIUM SCH MG: 40 TABLET, DELAYED RELEASE ORAL at 21:07

## 2017-06-14 RX ADMIN — TAMSULOSIN HYDROCHLORIDE SCH MG: 0.4 CAPSULE ORAL at 21:06

## 2017-06-14 RX ADMIN — ATORVASTATIN CALCIUM SCH MG: 40 TABLET, FILM COATED ORAL at 21:07

## 2017-06-14 RX ADMIN — OXYCODONE SCH MG: 5 TABLET ORAL at 19:30

## 2017-06-14 NOTE — XRAY PRELIMINARY REPORT
Accession: [id number]

Exam: XR Elbow 3 View LT

 

IMPRESSION: Negative 3 view left elbow radiography.

 

[facility]

 

SITE ID: [id number]

## 2017-06-14 NOTE — XRAY REPORT
EXAM:

LEFT FOREARM RADIOGRAPHY

 

EXAM DATE: 6/14/2017 02:27 PM.

 

CLINICAL HISTORY: Arm inj. pain. Fell out of bed.

 

COMPARISON: None.

 

TECHNIQUE: 2 views.

 

FINDINGS: 

Bones: Normal. No fractures or bone lesions.

 

Joints: Normal. No effusions or subluxations in the visualized wrist or elbow joints.

 

Soft Tissues: Normal. No soft tissue swelling.

 

IMPRESSION: -2 view left forearm radiography.

 

[facility]

Referring Provider Line: [phone]

 

SITE ID: [id number]

## 2017-06-14 NOTE — HISTORY & PHYSICAL EXAMINATION
DATE OF ADMISSION: 2017

 

PRIMARY CARE PROVIDER: Dr. Ray. 

endocrinologist is, Dr. Vazquez, his neurologist is Dr. Sandoval.

 

CHIEF COMPLAINT: Son found him on the floor by the bed this morning lying on 
his left side quite awkwardly with his head against the dresser.

 

HISTORY OF PRESENT ILLNESS: The patient is a 57-year-old male who is a long-
term type 1 diabetic who manages his insulin pump independently. He has 
multiple medical problems which are detailed below under past medical history, 
the most significant one that seemed to be contributing to his problem today 
include microscopic colitis with chronic diarrha and CHF for which he is on an 
ACE inhibitor and spironolactone. Last night the patient went to bed and was 
reportedly at his normal state of mentation. The son this morning found him 
lying on the floor next to the bed in a very awkward position on his left side 
and his head was leaning against the dresser. His sugar was found to be 30, 
EMTs came, they gave him amp of D50. His glucose improved to 180. He awakened, 
although he was still a little lethargic and slow to respond, not entirely at 
his baseline. His insulin pump was removed by the EMTs before they brought him 
in because they did not know how to turn it off. In the emergency room a repeat 
sugar approximately an hour before I am examining him at 4 p.m. was 90 and 
since he is still a bit slow a sugar repeated just now at 4:00 is approximately 
118. He denies any recent changes in his basal which at the moment he is not 
able to recall off the top of his head and his son is going to get his pump. 
His A1c when he was just here in December was 10.1. An A1c requested today was 
8.9. Also striking on his laboratory results today was acute kidney injury with 
a BUN and creatinine of 49/5.1 This is more than doubled from his BUN and 
creatinine in 2017 approximately 3 weeks ago when it was 27 and 1.9. It 
was 1.1 on . In January, his creatinine was 1.0 and his BUN was in the 20s. 
Today's corresponding GFR is approximately 12. Regarding possible reasons for 
acute kidney injury, there is nothing suggestive of a new infection or 
significant hypotension related to an infection. He is on antihypertensives for 
his congestive heart failure including Coreg and lisinopril. He has microscopic 
colitis and does report that his diarrhea has been worse over the last few 
days. His son says that for the last 2 days he has been in the bathroom for 
approximately half an hour on 2 occasions with loose bowel movements. He does 
report some difficulty initiating his stream and recently had his Flomax 
increased from 0.4 mg once daily to 0.4 mg twice daily. He denies any new 
medications or changes in doses of medications which would effect renal 
function. The only form of NSAID that he is on is a topical diclofenac and he 
says he uses that less than once weekly. 

 

The patient was here actually 3 times in the emergency room since the 2017 discharge, on 2017 he was in the emergency room for what was 
diagnosed as a COPD exacerbation/bronchitis, although he does not have a prior 
diagnosis of COPD. That ER documentation indicates that he was sent out with 
ProAir and Pulmicort, although he does not mention these currently. On 2017 he was again in the emergency room for grogginess and was found to be 
hypoglycemic. At that time he was not admitted. He responded to IV fluids and 
was sent home with glucoses in the 200 range at discharge. He was also given a 
stress dose of hydrocortisone in that ER presentation. Then on 2017 he 
was seen by Dr. Silver in the ER. He came to the emergency room for leg pain 
at that visit. At that time, the son found him on the floor with a sugar of 66, 
and he also required D50 at that time. He was not admitted. When asked if he 
has followed up with his endocrinologist on either of those occasions or with 
his PCP, he is not able to specify.

 

PAST MEDICAL HISTORY:

1. Diabetes mellitus type 1 since age 13, on an insulin pump, followed by Dr. Vazquez at [facility]. A1c done today is 8.9.

2. Coronary artery disease status post stenting.

3. Ischemic cardiomyopathy with an EF of 45-50%, that was done on an echo in 
early May admission here. Prior to that his EF was 35% per his report. He does 
have an AICD.

4. Hypertension.

5. Hyperlipidemia.

6. Obesity with a BMI of 34.2.

7. Osteoarthritis with chronic pain in the neck, shoulder, and hands.

8. History of depression and anxiety.

9. Panic attacks, none recently. These were associated with his myocardial 
infarction.

10. Gastroesophageal reflux disease.

11. Benign prostatic hypertrophy.

12. History of hypospadias.

13. Peripheral neuropathy.

14. New "persistent daily headaches." This was diagnosed with his neurologist 
at [facility] in [place] where he sees a Dr. Sandoval.

15. Diabetic retinopathy.

16. Microscopic colitis.

17. Irritable bowel syndrome.

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 Vazquez.

 

SURGICAL HISTORY:

1. AICD implantation.

2. Trigger finger release x5.

3. Hypospadias repair.

4. Carpal tunnel release.

5. Colonoscopy and EGD.

6. Right foot tumor removal.

7. Sinus surgery x2.

8. Vasectomy.

9. Cardiac catheterization and stent placement.

 

ALLERGIES: NO KNOWN DRUG ALLERGIES.

 

MEDICATION LIST: This was discussed with the patient today.

1. Flomax 0.4 mg twice daily. This is an increase from 0.4 mg once daily, very 
recently.

2. Simvastatin 80 mg. once daily.

3. Metformin 500 mg twice daily.

4. Hydrocortisone 5 mg daily in the a.m. and in the p.m. He denies that he is 
any longer taking 10 mg in the morning.

5. Diclofenac 4 grams topical if needed up to 4 times daily. He reports only 
taking that once a week.

6. Alprazolam 0.25 mg twice daily.

7. Multivitamin once daily.

8. Duloxetine 60 mg twice daily.

9. Ranitidine 300 mg. once each evening for GERD.

10. Lisinopril 20 mg. once daily.

11. Lidocaine patch 1-3 topically if needed for neuropathy, which he uses 
rarely.

12. Zolpidem 10 mg daily at bedtime with no reported confusion.

13. Spironolactone 12.5 mg. once daily.

14. Pantoprazole 40 mg twice daily.

15. Pregabalin 300 mg twice daily.

16. Aspirin 81 mg. once daily.

17. Bupropion 450 mg. once daily.

18. Oxycodone 5 mg every 4 hours if needed for pain. He does take this quite 
regularly for the neck, shoulder pain.

19. Coreg 12.5 mg twice daily.

20. Nortriptyline of uncertain dose.

21. Gabapentin 300 mg twice daily.

Uncertain if he is taking salicylate 262 mg 2 times daily for microscopic 
colitis. His son is bring back his I-phone with his medication list on it.  

22. Insulin pump management with carb counting.

 

REVIEW OF SYSTEMS: He reports that he has had approximately 20-pound weight 
gain over 6 months that is unintentional. No fevers or night sweats.

HEAD, EYES, EARS, NOSE AND THROAT: He has retinopathy, wears classes. Denies 
any recent changes, trouble chewing or swallowing.

CARDIOVASCULAR: He denies any chest pain or pressure. No new activity 
intolerance. No edema. No palpitations. No orthopnea, although he likes to 
sleep with the head of his bed elevated for his GERD.

RESPIRATORY: No shortness of breath, cough or wheezing.

GASTROINTESTINAL: As noted in the HPI frequent bowel movements due to his 
microscopic colitis.

GENITOURINARY: He does note that he has had some trouble starting his stream 
and for this reason, his Flomax dose was recently increased.

MUSCULOSKELETAL: Chronic pain in the neck, shoulders, which he notices 
especially if he is working with laundry, doing dishes, for which he takes his 
oxycodone. He did have a fall recently and is drifting off to sleep as he tries 
to discuss it. There was an ER presentations for a fall with hypoglycemia.

PSYCHIATRIC: He reports his mood is controlled on his home bupropion and 
duloxetine.

ENDOCRINE: The son reports that more recently he has noticed more frequently 
relatively low sugars in the evening, meaning a sugar close to 100 and he 
reports that his father does start to get a little bit less alert any time his 
sugar is below 100. 

 

SOCIAL HISTORY: He was born and raised in [facility], has lived in [place] and moved to [place] 4 years ago near where his daughter lives. He is 
actually living with his son. 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 He 
has a daughter nearby and he has been on disability since his heart attack in 
2012.

 

HABITS: He stopped smoking in  having smoked 1/2 pack a day for about 15 
years. He has rare alcohol intake and no history of illicit.

 

FAMILY HISTORY: His father  of coronary disease and COPD, prostate cancer. 
Father  at age 86 due to COPD. Mother  at age 52 due to metastatic lung 
cancer. He has 1 sister with diabetes, asthma, and a sister with bladder cancer.

 

PHYSICAL EXAMINATION:

GENERAL: The patient was seen in the emergency room on the stretcher. His son 
is at his bedside. He is a relatively short, obese male with slightly tamar 
complexion. He is not entirely alert, often closing his eyes, he appears to be 
drifting off, but then he does respond mostly appropriately and is able to 
recall details of his history.

HEAD, EARS, EYES, NOSE AND THROAT: He has an approximately egg sized contusion 
and early ecchymosis on his left temporal area. Otherwise, there is no head 
injury. He is balding. Eyes: His pupils are approximately 3 mm and reactive to 
light. Extraocular movements are intact. There are anicteric sclerae. Oral 
mucosa is with a slightly dry appearing tongue, but otherwise mucosa is moist. 
He has adequate dentition with a bridge on the upper teeth.

NECK: Very full. His carotids are +2 with no appreciable bruit.

RESPIRATORY: His respirations are unlabored. Breath sounds are somewhat distant 
and otherwise clear to auscultation. There is specifically no wheezing or 
crackles.

CARDIAC: His heart has a somewhat distant S1, S2. There is no appreciable murmur
, rub or gallop. He has a left chest AICD.

ABDOMEN: Very protuberant and somewhat firm. He does have active bowel sounds. 
It is nontender. Given his habitus, I am unable to appreciate any organs.

GENITALIA: Were not examined.

EXTREMITIES: Notable for no edema, no muscle atrophy.

NEUROLOGIC: As noted he is oriented to place, time and situation, although he 
is a little bit slow to respond. Gait was not assessed. He moves easily on the 
stretcher. Patellar and brachial reflexes are 1+.

SKIN: He has a contusion on his forehead as noted and some abrasions on his 
left upper arm and hand. There is no evident diabetic foot wound.

 

DIAGNOSTIC LABORATORY STUDIES:

Sodium 134, potassium 4.5, chloride 100, bicarbonate 23, BUN 49, creatinine 
5.1. This is compared with a BUN and creatinine on 2017 at 27 and 1.9. 
Estimated GFR is 12, glucose is 119. 

 

A1c done today is 8.9, that is compared with a 10.1 A1c in  2016, 
the last one in our records.

Troponin was less than 0.04. 

 

White count 9.2, hemoglobin 12.3, hematocrit 36.4, platelets 216,000. His 
hematocrit is fairly consistent with what it has been since early May. 

 

A urinalysis is unremarkable, no microscopic was done. On 2017 a urine 
microalbumin was 0.3.

 

DIAGNOSTIC IMAGIN. Head CT done today was a normal head CT. X-ray of the forearm showed no 
fractures or bone lesions and normal forearm view.

2. Humerus x-ray 2017 unremarkable left humerus series.

3. Chest x-ray 2017 mild cardiomegaly, new since the prior study with 
diffuse vascular fullness, no localized infiltrate, consolidation, effusion or 
pneumothorax.

 

ASSESSMENT AND PLAN:

1. Hypoglycemia. This has been a more persistent problem recently and is most 
likely related to his possibly decreasing insulin requirements in the setting 
of a progressively increasing creatinine with less insulin excretion by the 
kidneys. He had improved with an amp of D50 and most recently was 118. When his 
son brings back his pump we will revaluate starting at a lower basal rate. For 
the time we will check fingerstick blood glucoses every 2 hours and consider 
when to resume his insulin pump at a lesser rate with frequent fingerstick 
blood glucoses. We will hold his metformin now. He is followed by an 
endocrinologist. It is unusual that he is on metformin given that he is a type 1
, but with this creatinine, that is going to be on hold indefinitely until 
followup.

2. Acute kidney injury. His creatinine has been increasing since January, and 
is markedly increased today. He has minimal reasons to have an obstruction, but 
since it is such an acute increase and he does have BPH we will do bilateral 
renal ultrasound to make sure he does not possibly have retention and 
hydronephrosis. There is no history of malignancy that would suggest any reason 
to have ureteral impingement. Most likely an obstructive acute kidney injury 
would be ruled out. He may have a combination of prerenal and intrarenal injury 
due to gastrointestinal volume loss from his chronic diarrhea, which is 
worsening with continued use of ACEI and spironolactone in the setting of low 
volume. We will hold his ACE inhibitor and spironolactone . The diclofenac 
topical is not likely a contributor, but certainly that will be held while he 
is here and I will explore whether that can be absorbed systemically. I think 
it unlikely that his acute kidney injury represents a poor forward flow problem 
from his congestive heart failure as his most recent ejection fractions are 
improved, but I will check a BNP.   I will send off urine to assess a FENa if 
that is helpful. There has been no known hypotension, although his systolic 
here is currently in the low 100s, even 90. He has had systolics this low in 
the past, but in January he was running more 120s to 130s systolic. Most recent 
blood pressure was also with a systolic around 90. I will give him more fluid 
and reevaluate, but will need caution with his fluids given his systolic 
dysfunction.

5. Microscopic colitis and worsening diarrhea. Conceivably, he has a 
superimposed infection. We will send off stool for stool pathogens and C 
difficile, and once C difficile is ruled out, we will add Imodium and once his 
son brings in his medication list we will add whatever medications he has been 
prescribed for the microscopic colitis to help slow down bowel movements and as 
above, he will have continued volume repletion tonight given the acute kidney 
injury. I will contact his gastroenterologist to get recommendation. Not clear 
why his oral budenoside was stopped and when

6. Ischemic cardiomyopathy. He is compensated currently. As above, I do not 
believe his renal function represents impaired forward flow. His Coreg is on 
hold currently for his blood pressure as is his lisinopril and spironolactone. 
He will be monitored closely for any volume overload and will have daily 
weights.

7. Coronary artery disease. He will continue on his aspirin and statin. His 
Coreg is currently on hold due to his blood pressure.

8. Possible [place] disease. The patient reports that his hydrocortisone dose 
was decreased from 10 mg in the morning to 5 mg in the morning and evening. I 
will give him his 5 mg dose for now.

9. Anxiety and depression. He will continue on his home duloxetine.

10. Gastrointestinal reflux disease. He will continue on his home ranitidine 
and proton pump inhibitor.

11. Peripheral neuropathy. He will continue on his Lyrica and nortriptyline 
once we find out what his nortriptyline dose is and I will check if he needs 
renal dosing for that.

12. COR STATUS: HE IS A FULL COR.

13. Venous thromboembolism prophylaxis. He will be on no heparin due to his 
impaired renal clearance and will have SCDs.

14. New, persistent daily headaches. This is followed by a neurologist. He will 
continue with his gabapentin and low dose Lyrica which he says the neurologist 
is also using for that problem.

 

 

 

DD:2017 16:45:00  DT: 2017 19:14  JOB #: [id number]  EXT JOB #:[id number]

[email or web address]

## 2017-06-14 NOTE — ED PHYSICIAN DOCUMENTATION
History of Present Illness





- Stated complaint


Stated Complaint: HYPOGLYCEMIA





- Chief complaint


Chief Complaint: General





- History obtained from


History obtained from: Patient





- History of Present Illness


Timing: Today (57-year-old gentleman with long-standing diabetes, uses an 

insulin pump.  Lives with his son.  History is from the patient, paramedics, 

and the son.  He went to bed in his usual state of health last night.  His son 

found him this morning basically cockeyed in the bed laying on his left arm 

with his head first on the headboard and unresponsive.  His blood sugar was 30.

  He was administered D50 and his blood sugar went up and his mental status 

improved but did not quite normalize.  Patient is talking well and complains 

only of pain in the left arm around the shoulder.)





Review of Systems


Unable to obtain: Confused





PD PAST MEDICAL HISTORY





- Past Medical History


Cardiovascular: Hypertension, High cholesterol, Coronary artery disease, MI


Respiratory: Shortness of breath


Neuro: Peripheral neuropathy


Endocrine/Autoimmune: Type 1 diabetes, Other


GI: GERD, Chronic diarrhea


: Benign prostate hypertrophy, Other


HEENT: None


Psych: Depression, Anxiety, Panic attacks


Musculoskeletal: Osteoarthritis


Derm: None





- Past Surgical History


Past Surgical History: Yes


General: Colonoscopy, EGD


Ortho: Carpal Tunnel surgery, Other


Cardiovascular: Coronary stent, AICD, Cardiac catheterization


HEENT: Other





- Present Medications


Home Medications: 


 Ambulatory Orders











 Medication  Instructions  Recorded  Confirmed


 


Carvedilol 12.5 mg PO BID 11/11/14 05/24/17


 


Lisinopril 20 mg PO DAILY 11/11/14 05/24/17


 


Pantoprazole [Protonix] 40 mg PO BIDAC 11/11/14 05/24/17


 


Ranitidine HCl 300 mg PO QPM 11/11/14 05/24/17


 


Spironolactone 12.5 mg PO DAILY 11/11/14 05/24/17


 


Zolpidem Tartrate 10 mg PO QPM PRN 11/11/14 05/24/17


 


Multivitamin [Multi-Vitamin Daily] 1 each PO DAILY 11/19/14 05/24/17


 


oxyCODONE [Roxicodone] 5 mg PO Q4HR PRN 05/04/15 05/24/17


 


Aspirin [Aspirin EC] 81 mg PO DAILY 12/14/16 05/24/17


 


Duloxetine HCl [Cymbalta] 60 mg PO BID 12/14/16 05/24/17


 


Insulin Lispro [Humalog] 1 - 11 units SQ .SLIDING SCALE 12/14/16 05/24/17


 


Lidocaine Patch 5% [Lidoderm Patch] 1 - 3 patch TOP DAILY PRN 12/14/16 05/24/17


 


Pregabalin [Lyrica] 300 mg PO BID 12/14/16 05/24/17


 


Alprazolam 0.25 mg PO BID PRN 05/08/17 05/24/17


 


Bupropion HCl [Bupropion Xl] 450 mg PO DAILY 05/08/17 05/24/17


 


Diclofenac Sodium [Voltaren] 4 gm TOP QID PRN 05/08/17 05/24/17


 


Hydrocortisone 5 mg PO QDBREAKFAST 05/08/17 05/24/17


 


Hydrocortisone [Cortef] 5 mg PO QDDINNER 05/08/17 05/24/17


 


Nortriptyline HCl 20 mg PO 0800,1700 05/08/17 05/24/17


 


Tamsulosin [Flomax] 0.4 mg PO DAILY@1500 05/08/17 05/24/17


 


metFORMIN [Glucophage] 500 mg PO BIDWM 05/08/17 05/24/17


 


Atorvastatin [Lipitor] 80 mg PO QPM #90 tablet 05/09/17 05/24/17


 


Gabapentin [Neurontin] 300 mg PO BID  capsule 05/09/17 05/24/17


 


Hydrocortisone [Cortef] 5 mg PO QDLUNCH  tablet 05/09/17 05/24/17


 


Beclomethasone 80 Mcg [Qvar 80] 2 puffs INH BID #1 inhaler 05/14/17 05/24/17














- Allergies


Allergies/Adverse Reactions: 


 Allergies











Allergy/AdvReac Type Severity Reaction Status Date / Time


 


No Known Drug Allergies Allergy   Verified 06/14/17 13:16














- Social History


Does the pt smoke?: No


Smoking Status: Former smoker


Does the pt drink ETOH?: No


Does the pt have substance abuse?: No





- Family History


Family history: reports: Non contributory





- Immunizations


Immunizations are current?: Yes





- POLST


Patient has POLST: No





PD ED PE NORMAL





- Vitals


Vital signs reviewed: Yes





- General


General: Other (Laying in bed with eyes closed, answering simple questions, 

knows the year but not the month or day.)





- HEENT


HEENT: PERRL, EOMI, Other (small divet in the skin of the left forehead where 

his head was on the headboard.)





- Neck


Neck: Supple, no meningeal sign, No bony TTP





- Cardiac


Cardiac: RRR, No murmur





- Respiratory


Respiratory: No respiratory distress, Clear bilaterally





- Abdomen


Abdomen: Normal bowel sounds, Soft, Non tender





- Back


Back: No CVA TTP, No spinal TTP





- Extremities


Extremities: No deformity, Other (The left arm is diffusely tender around the 

elbow and swollen there are but not tense like a compartment syndrome he doesn'

t seem to have any pain with passive range of motion.  Pulses are good.  The 

remainder of the extremities are palpated and ranged and without tenderness or 

pain)





- Neuro


Neuro: CNs 2-12 intact, No motor deficit, No sensory deficit





Results





- Vitals


Vitals: 


 Vital Signs - 24 hr











  06/14/17 06/14/17





  13:05 14:31


 


Temperature 35.0 C L 


 


Heart Rate 67 68


 


Respiratory 14 12





Rate  


 


Blood Pressure 113/65 103/69


 


O2 Saturation 96 97








 Oxygen











O2 Source []                   Room air


 


O2 Source                      Room air

















- EKG (time done)


  ** 1339


Rate: Rate (enter#) (67)


Rhythm: NSR


Axis: Normal


Intervals: Normal NE


Ischemia: Q waves (anterior).  No: ST elevation c/w ischemia


Computer interpretation: Agree with computer





- Labs


Labs: 


 Laboratory Tests











  06/14/17 06/14/17 06/14/17





  13:10 13:10 13:10


 


WBC  9.2  


 


RBC  3.96 L  


 


Hgb  12.3 L  


 


Hct  36.4 L  


 


MCV  92.0  


 


MCH  31.2 H  


 


MCHC  33.9  


 


RDW  13.7  


 


Plt Count  216  


 


MPV  9.7  


 


Neut #  7.3 H  


 


Lymph #  0.8 L  


 


Mono #  0.9  


 


Eos #  0.1  


 


Baso #  0.0  


 


Absolute Nucleated RBC  0.00  


 


Nucleated RBCs  0.0  


 


Sodium   134 L 


 


Potassium   4.5 


 


Chloride   100 L 


 


Carbon Dioxide   23 


 


Anion Gap   11.0 


 


BUN   49 H 


 


Creatinine   5.1 H 


 


Estimated GFR (MDRD)   12 L 


 


Glucose   119 H 


 


Calcium   8.8 


 


Total Bilirubin   0.7 


 


AST   28 


 


ALT   25 


 


Alkaline Phosphatase   83 


 


Total Creatine Kinase   632 H 


 


CK-MB (CK-2)    19.8 H


 


Troponin I    < 0.04


 


Total Protein   7.2 


 


Albumin   3.9 


 


Globulin   3.3 


 


Albumin/Globulin Ratio   1.2 


 


Lipase   11 L 














- Rads (name of study)


  ** CT Head


Radiology: EMP read contemporaneously (NAD)





  ** XR L humerus/elbow/forearm


Radiology: EMP read contemporaneously (all neg)





PD MEDICAL DECISION MAKING





- ED course


ED course: 





57-year-old gentleman with type I diabetes presents with hypoglycemia, and for 

the history and physical was probably in hypoglycemic coma for a good portion 

of the night, it looks like he laid on his arm and also has a divot in his 

forehead from were contacted part of his bed frame.  The examination is not 

consistent with compartment syndrome in the arm, but it is tender.  X-rays are 

unremarkable, head CT normal.  He does have very mild rhabdomyolysis and acute 

renal failure with a creatinine of 5, no white abnormalities of significance.  

Spoke with Dr. Nails for admission at 225 p.m., requests a chest x-ray and 

urinalysis as well which were ordered.





Departure





- Departure


Disposition: [facility] DC/Xfer


Clinical Impression: 


 Hypoglycemia, Left arm pain





Acute renal failure


Qualifiers:


 Acute renal failure type: unspecified Qualified Code(s): N17.9 - Acute kidney 

failure, unspecified





Altered mental status


Qualifiers:


 Altered mental status type: delirium Qualified Code(s): R41.0 - Disorientation

, unspecified


Condition: Serious

## 2017-06-14 NOTE — XRAY PRELIMINARY REPORT
Accession: [id number]

Exam: XR Chest 1 View

 

IMPRESSION: Cardiovascular fullness.

 

[facility]

 

SITE ID: [id number]

## 2017-06-14 NOTE — XRAY REPORT
EXAM:

LEFT HUMERUS RADIOGRAPHY

 

EXAM DATE: 6/14/2017 02:27 PM.

 

CLINICAL HISTORY: Arm injury

 

COMPARISON: None.

 

TECHNIQUE: 2 views, 3 films.

 

FINDINGS: 

Bones: No fracture. Minimal spurring at the greater tuberosity and lateral epicondyle.

 

Joints: Normal. No effusions or subluxations in the visualized shoulder or elbow joints.

 

Soft Tissues: Normal. No soft tissue swelling.

 

IMPRESSION: Unremarkable left humerus series.

 

[facility]

Referring Provider Line: [phone]

 

SITE ID: [id number]

## 2017-06-14 NOTE — ULTRASOUND REPORT
EXAM:

RENAL ULTRASOUND

 

EXAM DATE: 6/14/2017 05:09 p.m.

 

CLINICAL HISTORY: Acute kidney injury. Cr 5.1.

 

COMPARISON: None.

 

TECHNIQUE: Real-time scanning was performed with static images obtained. 

 

FINDINGS:

Right Kidney: 11.0 x 5.7 x 5.0 cm. Normal echotexture with no stones, contour-deforming masses, or hy
dronephrosis.

 

Left Kidney: 11.2 x 5.6 x 5.3 cm. Normal echotexture with no stones, contour-deforming masses, or hyd
ronephrosis. Small posterior cyst noted 1.4 x 1.0 x 0.9 cm. 

 

IMPRESSION: 

Small posterior left renal cyst, otherwise unremarkable renal ultrasound.

 

[facility]

Referring Provider Line: [phone]

 

SITE ID: [id number]

## 2017-06-14 NOTE — XRAY PRELIMINARY REPORT
Accession: [id number]

Exam: XR Humerus LT

 

IMPRESSION: Unremarkable left humerus series.

 

[facility]

 

SITE ID: [id number]

## 2017-06-14 NOTE — CT REPORT
EXAM:

CT HEAD

 

EXAM DATE: 6/14/2017 01:57 PM.

 

CLINICAL HISTORY: Head inj.

 

COMPARISON: 05/08/2017.

 

TECHNIQUE: Multiaxial CT images were obtained from the foramen magnum to the vertex. IV contrast: Non
e. Reformats: Coronal.

 

In accordance with CT protocol optimization, one or more of the following dose reduction techniques w
ere utilized for this exam: automated exposure control, adjustment of mA and/or KV based on patient s
ize, or use of iterative reconstructive technique.

 

FINDINGS:

Parenchyma: No intraparenchymal hemorrhage. No evidence of mass, midline shift, or CT findings of inf
arction. Gray-white differentiation is distinct.

 

Extraaxial Spaces: Normal for age. No subdural or epidural collections.

 

Ventricles: Normal in size and position.

 

Sinuses: Imaged paranasal sinuses, orbits, and mastoids show no significant abnormality.

 

Bones: Unremarkable.

 

Other: None.

 

IMPRESSION: Normal head CT.

 

[facility]

Referring Provider Line: [phone]

 

SITE ID: [id number]

## 2017-06-14 NOTE — XRAY REPORT
EXAM: 

CHEST RADIOGRAPHY

 

EXAM DATE: 6/14/2017 03:21 PM.

 

CLINICAL HISTORY: Altered mental status.

 

COMPARISON: 06/08/2012.

 

TECHNIQUE: 1 view.

 

FINDINGS:

Lungs/Pleura: No localized infiltrate, consolidation, effusion, or pneumothorax.

 

Mediastinum: Mild cardiomegaly, new since previous study, with diffuse vascular fullness.

 

Other: Permanent pacemaker on the left with intact lead extending to expected location of right ventr
icle.

 

IMPRESSION: Cardiovascular fullness.

 

[facility]

Referring Provider Line: [phone]

 

SITE ID: [id number]

## 2017-06-15 LAB
ANION GAP SERPL CALCULATED.4IONS-SCNC: 12 MMOL/L (ref 6–13)
ANION GAP SERPL CALCULATED.4IONS-SCNC: 8 MMOL/L (ref 6–13)
BUN SERPL-MCNC: 51 MG/DL (ref 6–20)
BUN SERPL-MCNC: 57 MG/DL (ref 6–20)
CALCIUM UR-MCNC: 8.1 MG/DL (ref 8.5–10.3)
CALCIUM UR-MCNC: 8.3 MG/DL (ref 8.5–10.3)
CHLORIDE SERPL-SCNC: 101 MMOL/L (ref 101–111)
CHLORIDE SERPL-SCNC: 102 MMOL/L (ref 101–111)
CO2 SERPL-SCNC: 19 MMOL/L (ref 21–32)
CO2 SERPL-SCNC: 23 MMOL/L (ref 21–32)
CREAT SERPLBLD-SCNC: 3 MG/DL (ref 0.6–1.2)
CREAT SERPLBLD-SCNC: 3.7 MG/DL (ref 0.6–1.2)
ERYTHROCYTE [DISTWIDTH] IN BLOOD BY AUTOMATED COUNT: 13.9 % (ref 12–15)
GFRSERPLBLD MDRD-ARVRAT: 17 ML/MIN/{1.73_M2} (ref 89–?)
GFRSERPLBLD MDRD-ARVRAT: 22 ML/MIN/{1.73_M2} (ref 89–?)
GLUCOSE SERPL-MCNC: 346 MG/DL (ref 70–100)
GLUCOSE SERPL-MCNC: 404 MG/DL (ref 70–100)
HCT VFR BLD AUTO: 35.9 % (ref 42–52)
HGB UR QL STRIP: 11.9 G/DL (ref 14–18)
MCH RBC QN AUTO: 31 PG (ref 27–31)
MCHC RBC AUTO-ENTMCNC: 33 G/DL (ref 32–36)
MCV RBC AUTO: 93.7 FL (ref 80–94)
NEUTROPHILS # SNV AUTO: 7.1 X10^3/UL (ref 4.8–10.8)
PDW BLD AUTO: 9.4 FL (ref 7.4–11.4)
POTASSIUM SERPL-SCNC: 5.1 MMOL/L (ref 3.5–5)
POTASSIUM SERPL-SCNC: 6.1 MMOL/L (ref 3.5–5)
RBC MAR: 3.84 10^6/UL (ref 4.7–6.1)
SODIUM SERPLBLD-SCNC: 132 MMOL/L (ref 135–145)
SODIUM SERPLBLD-SCNC: 133 MMOL/L (ref 135–145)

## 2017-06-15 RX ADMIN — IPRATROPIUM BROMIDE AND ALBUTEROL SULFATE PRN ML: 2.5; .5 SOLUTION RESPIRATORY (INHALATION) at 09:20

## 2017-06-15 RX ADMIN — INSULIN ASPART SCH UNIT: 100 INJECTION, SOLUTION INTRAVENOUS; SUBCUTANEOUS at 21:48

## 2017-06-15 RX ADMIN — ATORVASTATIN CALCIUM SCH MG: 40 TABLET, FILM COATED ORAL at 21:48

## 2017-06-15 RX ADMIN — CETIRIZINE HYDROCHLORIDE SCH MG: 10 TABLET, FILM COATED ORAL at 09:41

## 2017-06-15 RX ADMIN — LOPERAMIDE HYDROCHLORIDE PRN MG: 2 CAPSULE ORAL at 13:47

## 2017-06-15 RX ADMIN — INSULIN ASPART SCH: 100 INJECTION, SOLUTION INTRAVENOUS; SUBCUTANEOUS at 08:56

## 2017-06-15 RX ADMIN — INSULIN GLARGINE ONE UNIT: 100 INJECTION, SOLUTION SUBCUTANEOUS at 07:43

## 2017-06-15 RX ADMIN — SODIUM CHLORIDE SCH MLS/HR: 9 INJECTION, SOLUTION INTRAVENOUS at 16:30

## 2017-06-15 RX ADMIN — OXYCODONE SCH MG: 5 TABLET ORAL at 03:08

## 2017-06-15 RX ADMIN — IPRATROPIUM BROMIDE AND ALBUTEROL SULFATE PRN ML: 2.5; .5 SOLUTION RESPIRATORY (INHALATION) at 19:05

## 2017-06-15 RX ADMIN — INSULIN GLARGINE ONE: 100 INJECTION, SOLUTION SUBCUTANEOUS at 09:04

## 2017-06-15 RX ADMIN — PANTOPRAZOLE SODIUM SCH MG: 40 TABLET, DELAYED RELEASE ORAL at 16:30

## 2017-06-15 RX ADMIN — BUDESONIDE SCH MG: 0.5 SUSPENSION RESPIRATORY (INHALATION) at 09:20

## 2017-06-15 RX ADMIN — BUDESONIDE SCH MG: 0.5 SUSPENSION RESPIRATORY (INHALATION) at 19:05

## 2017-06-15 RX ADMIN — SODIUM CHLORIDE SCH MLS/HR: 9 INJECTION, SOLUTION INTRAVENOUS at 22:53

## 2017-06-15 RX ADMIN — BUDESONIDE SCH MG: 3 CAPSULE ORAL at 14:59

## 2017-06-15 RX ADMIN — SODIUM CHLORIDE SCH MLS/HR: 9 INJECTION, SOLUTION INTRAVENOUS at 06:59

## 2017-06-15 RX ADMIN — ASPIRIN SCH MG: 81 TABLET, COATED ORAL at 09:40

## 2017-06-15 RX ADMIN — HYDROCORTISONE SCH MG: 10 TABLET ORAL at 09:40

## 2017-06-15 RX ADMIN — OXYCODONE SCH MG: 5 TABLET ORAL at 07:00

## 2017-06-15 RX ADMIN — HYDROCORTISONE SCH MG: 10 TABLET ORAL at 16:30

## 2017-06-15 RX ADMIN — OXYCODONE SCH MG: 5 TABLET ORAL at 15:01

## 2017-06-15 RX ADMIN — NORTRIPTYLINE HYDROCHLORIDE SCH MG: 10 CAPSULE ORAL at 16:30

## 2017-06-15 RX ADMIN — SODIUM CHLORIDE, PRESERVATIVE FREE SCH: 5 INJECTION INTRAVENOUS at 13:46

## 2017-06-15 RX ADMIN — TAMSULOSIN HYDROCHLORIDE SCH MG: 0.4 CAPSULE ORAL at 09:41

## 2017-06-15 RX ADMIN — NORTRIPTYLINE HYDROCHLORIDE SCH MG: 10 CAPSULE ORAL at 09:40

## 2017-06-15 RX ADMIN — INSULIN ASPART SCH: 100 INJECTION, SOLUTION INTRAVENOUS; SUBCUTANEOUS at 21:47

## 2017-06-15 RX ADMIN — SODIUM CHLORIDE SCH MLS/HR: 9 INJECTION, SOLUTION INTRAVENOUS at 14:54

## 2017-06-15 RX ADMIN — SODIUM CHLORIDE, PRESERVATIVE FREE SCH: 5 INJECTION INTRAVENOUS at 07:00

## 2017-06-15 RX ADMIN — LOPERAMIDE HYDROCHLORIDE PRN MG: 2 CAPSULE ORAL at 09:44

## 2017-06-15 RX ADMIN — PSYLLIUM HUSK SCH PACKET: 3.4 POWDER ORAL at 21:48

## 2017-06-15 RX ADMIN — OXYCODONE SCH: 5 TABLET ORAL at 11:39

## 2017-06-15 RX ADMIN — INSULIN ASPART SCH UNIT: 100 INJECTION, SOLUTION INTRAVENOUS; SUBCUTANEOUS at 18:02

## 2017-06-15 RX ADMIN — OXYCODONE SCH: 5 TABLET ORAL at 22:22

## 2017-06-15 RX ADMIN — POLYETHYLENE GLYCOL 3350 SCH: 17 POWDER, FOR SOLUTION ORAL at 09:44

## 2017-06-15 RX ADMIN — THERA TABS SCH TAB: TAB at 09:40

## 2017-06-15 RX ADMIN — PSYLLIUM HUSK SCH PACKET: 3.4 POWDER ORAL at 13:46

## 2017-06-15 RX ADMIN — PANTOPRAZOLE SODIUM SCH MG: 40 TABLET, DELAYED RELEASE ORAL at 07:00

## 2017-06-15 RX ADMIN — TAMSULOSIN HYDROCHLORIDE SCH MG: 0.4 CAPSULE ORAL at 21:49

## 2017-06-15 RX ADMIN — OXYCODONE SCH MG: 5 TABLET ORAL at 21:49

## 2017-06-15 RX ADMIN — INSULIN ASPART SCH UNIT: 100 INJECTION, SOLUTION INTRAVENOUS; SUBCUTANEOUS at 11:41

## 2017-06-15 RX ADMIN — PREGABALIN SCH MG: 100 CAPSULE ORAL at 21:48

## 2017-06-15 RX ADMIN — SODIUM CHLORIDE, PRESERVATIVE FREE SCH ML: 5 INJECTION INTRAVENOUS at 21:50

## 2017-06-15 NOTE — XRAY REPORT
EXAM:

LEFT ELBOW RADIOGRAPHY

 

EXAM DATE: 6/14/2017 02:27 p.m.

 

CLINICAL HISTORY: Arm inj. Pain.

 

COMPARISON: None.

 

TECHNIQUE: 3 views.

 

FINDINGS: 

Bones: No fractures or bone lesions.

 

Joints: No effusion. No subluxation.

 

Soft Tissues: No soft tissue swelling.

 

IMPRESSION:

Negative 3-view left elbow radiography.

 

[facility]

Referring Provider Line: [phone]

 

SITE ID: [id number]

## 2017-06-15 NOTE — PROVIDER PROGRESS NOTE
Subjective





- Prog Note Date


Prog Note Date: 06/15/17


Prog Note Time: 07:50





- Subjective


Pt reports feeling: Improved (feeling over all better doesnt  really remember 

events of coming  no pain, no shortness of breath, he remembered my asking to  

calling son to bring in his insulin pump and his son has brought it in Month 

September" Season "fall"   but when asked Birthdate and if recently had birthday

, he realizes that is incorrect > "early summer, June" Patientstates" 

neurologist has wanted to do MRI of my brain but my AICD is not compatible w/ 

MRI)


Subjective: 





no complaints this morning


"for a sugar of 404 (this am serum gluc) , I normally would not eat





He cant recall name of gastroenterologist (re: microscopic colitis/collagenous 

colitis management


I contacted [facility] office>> Dr. Steve Funes [facility], I spoke w/ PA.  He 

will call back later to d/w me  





Current Medications





- Current Medications


Current Medications: 








Active Medications











Generic Name Dose Route Start Last Admin





  Trade Name Freq  PRN Reason Stop Dose Admin


 


Alprazolam  0.5 mg 06/14/17 21:00 06/14/17 21:17





  Xanax  PO   0.5 mg





  BID PRN   Administration





  ANXIETY   


 


Aspirin  81 mg 06/15/17 09:00  





  Ecotrin  PO   





  DAILY MARIA ELENA   


 


Atorvastatin Calcium  40 mg 06/14/17 21:00 06/14/17 21:07





  Lipitor  PO   40 mg





  QPM MARIA ELENA   Administration


 


Bupropion HCl  150 mg 06/15/17 09:00  





  Wellbutrin Xl  PO   





  DAILY MARIA ELENA   


 


Cetirizine HCl  10 mg 06/15/17 09:00  





  Zyrtec  PO   





  DAILY MARIA ELENA   


 


Hydrocortisone  10 mg 06/15/17 08:00  





  Cortef  PO   





  QDBREAKFAST MARIA ELENA   


 


Hydrocortisone  5 mg 06/15/17 17:00  





  Cortef  PO   





  DAILY@1700 MARIA ELENA   


 


Sodium Chloride  1,000 mls @ 75 mls/hr 06/14/17 15:00 06/15/17 06:59





  Normal Saline 0.9%  IV   75 mls/hr





  [id number] MARIA ELENA   Administration


 


Insulin Aspart  1 - 9 unit 06/14/17 21:00 06/14/17 21:03





  Novolog  SUBQ   9 unit





  0800,1200,1700,2100 MARIA ELENA   Administration





  Protocol   


 


Insulin Aspart  10 unit 06/15/17 07:49  





  Novolog  SUBQ 06/15/17 07:50  





  ONCE ONE   


 


Insulin Glargine  30 unit 06/14/17 21:00 06/14/17 21:04





  Lantus Solostar  SUBQ   30 unit





  QPM MARIA ELENA   Administration


 


Insulin Glargine  35 unit 06/15/17 08:00 06/15/17 07:43





  Lantus Solostar  SUBQ 06/15/17 08:01  35 unit





  ONCE ONE   Administration


 


Multivitamins  1 tab 06/15/17 08:00  





  Theragran  PO   





  DAILYWM MARIA ELENA   


 


Nortriptyline HCl  20 mg 06/15/17 08:00  





  Pamelor  PO   





  0800,1700 MARIA ELENA   


 


Oxycodone HCl  5 mg 06/14/17 19:00 06/15/17 07:00





  Roxicodone  PO   5 mg





  Q4H MARIA ELENA   Administration


 


Pantoprazole Sodium  40 mg 06/14/17 21:00 06/15/17 07:00





  Protonix  PO   40 mg





  BIDAC MARIA ELENA   Administration


 


Polyethylene Glycol  17 gm 06/15/17 09:00  





  Miralax  PO   





  DAILY MARIA ELENA   


 


Pregabalin  300 mg 06/14/17 21:00 06/14/17 21:09





  Lyrica  PO   300 mg





  QPM MARIA ELENA   Administration


 


Ranitidine HCl  300 mg 06/14/17 21:00 06/14/17 21:08





  Zantac  PO   300 mg





  QPM MARIA ELENA   Administration


 


Sodium Chloride  10 ml 06/14/17 14:57  





  Normal Saline Flush 0.9%  IVP   





  PRN PRN   





  AS NEEDED PER PROVIDER ORDERS   


 


Sodium Chloride  10 ml 06/14/17 22:00 06/15/17 07:00





  Normal Saline Flush 0.9%  IVP   Not Given





  Q8HR MARIA ELENA   


 


Tamsulosin HCl  0.4 mg 06/14/17 21:00 06/14/17 21:06





  Flomax  PO   0.4 mg





  BID MARIA ELENA   Administration


 


Zolpidem Tartrate  10 mg 06/14/17 21:00 06/14/17 22:18





  Ambien  PO   10 mg





  QPM MARIA ELENA   Administration








 





Carvedilol 12.5 mg PO BID 11/11/14 


Lisinopril 20 mg PO DAILY 11/11/14 


Pantoprazole [Protonix] 40 mg PO BIDAC 11/11/14 


Ranitidine HCl 300 mg PO QPM 11/11/14 


Spironolactone 12.5 mg PO DAILY 11/11/14 


Zolpidem Tartrate 10 mg PO QPM PRN 11/11/14 


Multivitamin [Multi-Vitamin Daily] 1 each PO DAILY 11/19/14 


oxyCODONE [Roxicodone] 5 mg PO Q4H 05/04/15 


Aspirin [Aspirin EC] 81 mg PO DAILY 12/14/16 


Duloxetine HCl [Cymbalta] 60 mg PO BID 12/14/16 


Insulin Lispro [Humalog] 1 - 11 units SQ .SLIDING SCALE 12/14/16 


Pregabalin [Lyrica] 300 mg PO BID 12/14/16 


Hydrocortisone 10 mg PO QDBREAKFAST 05/08/17 


Nortriptyline HCl 20 mg PO 0800,1700 05/08/17 


Tamsulosin [Flomax] 0.4 mg PO BID 05/08/17 


metFORMIN [Glucophage] 500 mg PO BIDWM 05/08/17 


Alprazolam [Alprazolam] 0.5 mg PO BID PRN 06/14/17 


Budesonide [Entocort EC] 9 mg PO DAILY 06/14/17 


Bupropion HCl [Bupropion Xl] 150 mg PO DAILY 06/14/17 


Cetirizine [ZyrTEC] 10 mg PO DAILY 06/14/17 


Diclofenac Sodium [Voltaren] 4 gm TP QID PRN 06/14/17 


Furosemide [Furosemide] 10 mg PO DAILY 06/14/17 


Gabapentin 300 mg PO BID 06/14/17 


Hydrocortisone [Cortef] 10 mg PO DAILYWM 06/14/17 


Lidocaine Patch 5% [Lidoderm Patch] 1 each TOP DAILY PRN 06/14/17 


Simvastatin [Simvastatin] 80 mg PO QPM 06/14/17 











Objective





- Vital Signs/Intake & Output


Reviewed Vital Signs: Yes


Vital Signs: 





 Vital Signs x48h











  Temp Pulse Resp BP Pulse Ox


 


 06/15/17 03:19  36.4 C L  101 H  16  108/65  96











Intake & Output: 





 Intake & Output











 06/12/17 06/13/17 06/14/17 06/15/17





 23:59 23:59 23:59 23:59


 


Intake Total   1481 620


 


Output Total   300 


 


Balance   1181 620














- Objective


General Appearance: positive: No acute distress, Other (arouses easily, still 

seems slow to respond, mostly appropriate, but difficulty with memory of some 

detail.  Mostly articulate)


Eyes Bilateral: positive: PERRL, EOMI, Other (wearing glasses)


Cardiovascular: positive: Gallop/S4 (diffuse high pitched wheezing this 

morning. unlabored resps cleared later after neb)


Abdomen: positive: Nml bowel sounds, No distention, Other (obese, rounded after 

a BM large amount of watery brown stool seen in toilet)


Skin: positive: Warm, Dry.  negative: Diaphoresis


Extremities: positive: No pedal edema


Neurologic/Psychiatric: positive: Other (still having memory issues nonfocal 

exam, equal strength, EOMI, rapid alt mvmnts intact lables objects correctly, 

seems to drift off when trying to remember detail, otherwise appropriate.    DM 

educator noted he is slow in putting together insulin pump)





- Lab Results


Fish Bones: 


 06/15/17 05:42





 06/15/17 10:56


Other Labs: 





 Lab Results x24hrs











  06/15/17 06/15/17 06/14/17 Range/Units





  05:42 05:42 20:20 


 


WBC   7.1   (4.8-10.8)  x10^3/uL


 


RBC   3.84 L   (4.70-6.10)  10^6/uL


 


Hgb   11.9 L   (14.0-18.0)  g/dL


 


Hct   35.9 L   (42.0-52.0)  %


 


MCV   93.7   (80.0-94.0)  fL


 


MCH   31.0   (27.0-31.0)  pg


 


MCHC   33.0   (32.0-36.0)  g/dL


 


RDW   13.9   (12.0-15.0)  %


 


Plt Count   205   (130-450)  10^3/uL


 


MPV   9.4   (7.4-11.4)  fL


 


Sodium  132 L    (135-145)  mmol/L


 


Potassium  6.1 H*    (3.5-5.0)  mmol/L


 


Chloride  101    (101-111)  mmol/L


 


Carbon Dioxide  19 L    (21-32)  mmol/L


 


Anion Gap  12.0    (6-13)  


 


BUN  57 H    (6-20)  mg/dL


 


Creatinine  3.7 H    (0.6-1.2)  mg/dL


 


Estimated GFR (MDRD)  17 L    (>89)  


 


Glucose  404 H    ()  mg/dL


 


Calcium  8.1 L    (8.5-10.3)  mg/dL


 


Urine Color     


 


Urine Clarity     (CLEAR)  


 


Urine pH     (5.0-7.5)  PH


 


Ur Specific Gravity     (1.002-1.030)  


 


Urine Protein     (NEGATIVE)  mg/dL


 


Urine Glucose (UA)     (NEGATIVE)  mg/dL


 


Urine Ketones     (NEGATIVE)  mg/dL


 


Urine Occult Blood     (NEGATIVE)  


 


Urine Nitrite     (NEGATIVE)  


 


Urine Bilirubin     (NEGATIVE)  


 


Urine Urobilinogen     (NORMAL)  E.U./dL


 


Ur Leukocyte Esterase     (NEGATIVE)  


 


Urine RBC     (0-5)  /HPF


 


Urine WBC     (0-3)  /HPF


 


Ur Squamous Epith Cells     (<= Few)  


 


Urine Bacteria     (None Seen)  /HPF


 


Urine Casts     /LPF


 


Urine Culture Comments     


 


Urine Creatinine     mg/dL


 


Urine Sodium    65.0  mmol/L














  06/14/17 06/14/17 Range/Units





  20:20 20:20 


 


WBC    (4.8-10.8)  x10^3/uL


 


RBC    (4.70-6.10)  10^6/uL


 


Hgb    (14.0-18.0)  g/dL


 


Hct    (42.0-52.0)  %


 


MCV    (80.0-94.0)  fL


 


MCH    (27.0-31.0)  pg


 


MCHC    (32.0-36.0)  g/dL


 


RDW    (12.0-15.0)  %


 


Plt Count    (130-450)  10^3/uL


 


MPV    (7.4-11.4)  fL


 


Sodium    (135-145)  mmol/L


 


Potassium    (3.5-5.0)  mmol/L


 


Chloride    (101-111)  mmol/L


 


Carbon Dioxide    (21-32)  mmol/L


 


Anion Gap    (6-13)  


 


BUN    (6-20)  mg/dL


 


Creatinine    (0.6-1.2)  mg/dL


 


Estimated GFR (MDRD)    (>89)  


 


Glucose    ()  mg/dL


 


Calcium    (8.5-10.3)  mg/dL


 


Urine Color  YELLOW   


 


Urine Clarity  CLEAR   (CLEAR)  


 


Urine pH  5.5   (5.0-7.5)  PH


 


Ur Specific Gravity  1.025   (1.002-1.030)  


 


Urine Protein  TRACE   (NEGATIVE)  mg/dL


 


Urine Glucose (UA)  NEGATIVE   (NEGATIVE)  mg/dL


 


Urine Ketones  TRACE   (NEGATIVE)  mg/dL


 


Urine Occult Blood  LARGE H   (NEGATIVE)  


 


Urine Nitrite  NEGATIVE   (NEGATIVE)  


 


Urine Bilirubin  NEGATIVE   (NEGATIVE)  


 


Urine Urobilinogen  0.2 (NORMAL)   (NORMAL)  E.U./dL


 


Ur Leukocyte Esterase  NEGATIVE   (NEGATIVE)  


 


Urine RBC  0-5   (0-5)  /HPF


 


Urine WBC  0-3   (0-3)  /HPF


 


Ur Squamous Epith Cells  RARE Squamous   (<= Few)  


 


Urine Bacteria  Few   (None Seen)  /HPF


 


Urine Casts  3-5 Course Granular   /LPF


 


Urine Culture Comments  NOT INDICATED   


 


Urine Creatinine   149.3  mg/dL


 


Urine Sodium    mmol/L














Assessment/Plan





- Problem List


(1) Acute renal failure


Impression: 


Improving with volume depletion (suspect significant GI losses w/ chronic 

diarrhea (see below) and superimposed impaired renal autoregulation on ACEI, DM1

, diuretic/spironolactone


renal u/s normal





FeNa > 1 (1.7) suggesting intra renal, but response to IVF suggesting also 

prerenal component


  BUN /CR were 27/1.9 late May       Cr 5.1 yesterday>> 3.7 this morning,  3.0 

by 11am


continue IVF


holding ACEI today


holding sprionolactone 


holding metformin


holding coreg for BP


recheck in am


Qualifiers: 


   Acute renal failure type: unspecified   Qualified Code(s): N17.9 - Acute 

kidney failure, unspecified   





(2) Hypoglycemia


Impression: 


likely due to impaired renal clearance of insulin as above w/ much reduced GFR


resolved, adding back insulin as above








(3) Diabetes mellitus type 1 with complications


Impression: 


A1C better than in Jan, but still poor control


Suspect recent hypoglycemia (including several ED presentations in May) due to 

impaired renal clearance


 Gluc elevated this am ~ 400 despite lantus 30 last pm, w/ incipient dka 

possibly;no anion gap, but bicarb reduced today, small serum ketones K 6.1


 Improved w/ short acting insuling 10 u. and 35 units insulin lantus and IVF.  


  much appreciate DM educator assist


 Basal back on at 3.2 u/hr 


Will not have patient do prandial or correctional insulin yet as he is still a 

little slow


stopped lantus


Prandial 10 units (for 4 gram carb diet per DM educator)


and correction 1 unit for each 20 over 140








(4) Collagenous colitis


Impression: 


uncontrolled diarrhea for multiple weeks, pt reports budesonide was stopped 

some time ago (> 1 mos ago)


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


started immodium, metamucil, 


stool cx and cdif neg


I spoke w/ GI Dr. Benitez ([facility]) from [facility] who dx'd his 

collagenous colitis


Plan:  restart his Budesonide 9 mg daily PO


cc Dr. benitez on d/c 








(5) Ischemic cardiomyopathy


Impression: 


compensated


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


holding coreg, acei, spironolactone currently for DOT, 


will reeval resuming coreg in am








(6) Hyperkalemia


Impression: 


was not hyperkalemic w/ presentation/ Cr 5.1


likely related to early ketosis


improved with insulin


recheck in am








(7) Confusion


Impression: 


primarily memory issues, and slowless w/ using iphone 


Son reported confusion w/ glucose < 100


glucose now up, suspect ambien


d/c ambien


if still slow in am (was sharp when I last met him), consider CT(cant get MR w/ 

AICD), but exam not focal

## 2017-06-16 LAB
ANION GAP SERPL CALCULATED.4IONS-SCNC: 6 MMOL/L (ref 6–13)
BUN SERPL-MCNC: 31 MG/DL (ref 6–20)
CALCIUM UR-MCNC: 8.5 MG/DL (ref 8.5–10.3)
CHLORIDE SERPL-SCNC: 108 MMOL/L (ref 101–111)
CO2 SERPL-SCNC: 27 MMOL/L (ref 21–32)
CREAT SERPLBLD-SCNC: 1.6 MG/DL (ref 0.6–1.2)
GFRSERPLBLD MDRD-ARVRAT: 45 ML/MIN/{1.73_M2} (ref 89–?)
GLUCOSE SERPL-MCNC: 162 MG/DL (ref 70–100)
POTASSIUM SERPL-SCNC: 4.7 MMOL/L (ref 3.5–5)
SODIUM SERPLBLD-SCNC: 141 MMOL/L (ref 135–145)

## 2017-06-16 RX ADMIN — SODIUM CHLORIDE, PRESERVATIVE FREE SCH: 5 INJECTION INTRAVENOUS at 12:44

## 2017-06-16 RX ADMIN — TAMSULOSIN HYDROCHLORIDE SCH MG: 0.4 CAPSULE ORAL at 08:24

## 2017-06-16 RX ADMIN — PSYLLIUM HUSK SCH PACKET: 3.4 POWDER ORAL at 08:23

## 2017-06-16 RX ADMIN — CETIRIZINE HYDROCHLORIDE SCH MG: 10 TABLET, FILM COATED ORAL at 08:24

## 2017-06-16 RX ADMIN — OXYCODONE SCH: 5 TABLET ORAL at 06:50

## 2017-06-16 RX ADMIN — INSULIN ASPART SCH UNIT: 100 INJECTION, SOLUTION INTRAVENOUS; SUBCUTANEOUS at 11:58

## 2017-06-16 RX ADMIN — INSULIN ASPART SCH: 100 INJECTION, SOLUTION INTRAVENOUS; SUBCUTANEOUS at 08:14

## 2017-06-16 RX ADMIN — OXYCODONE SCH MG: 5 TABLET ORAL at 02:59

## 2017-06-16 RX ADMIN — PANTOPRAZOLE SODIUM SCH MG: 40 TABLET, DELAYED RELEASE ORAL at 17:01

## 2017-06-16 RX ADMIN — HYDROCORTISONE SCH MG: 10 TABLET ORAL at 08:23

## 2017-06-16 RX ADMIN — IPRATROPIUM BROMIDE AND ALBUTEROL SULFATE PRN ML: 2.5; .5 SOLUTION RESPIRATORY (INHALATION) at 07:45

## 2017-06-16 RX ADMIN — BUDESONIDE SCH MG: 3 CAPSULE ORAL at 08:23

## 2017-06-16 RX ADMIN — BUDESONIDE SCH MG: 0.5 SUSPENSION RESPIRATORY (INHALATION) at 07:45

## 2017-06-16 RX ADMIN — IPRATROPIUM BROMIDE AND ALBUTEROL SULFATE PRN ML: 2.5; .5 SOLUTION RESPIRATORY (INHALATION) at 19:30

## 2017-06-16 RX ADMIN — HYDROCORTISONE SCH MG: 10 TABLET ORAL at 17:01

## 2017-06-16 RX ADMIN — PANTOPRAZOLE SODIUM SCH MG: 40 TABLET, DELAYED RELEASE ORAL at 06:49

## 2017-06-16 RX ADMIN — SODIUM CHLORIDE SCH MLS/HR: 9 INJECTION, SOLUTION INTRAVENOUS at 06:51

## 2017-06-16 RX ADMIN — INSULIN ASPART SCH UNIT: 100 INJECTION, SOLUTION INTRAVENOUS; SUBCUTANEOUS at 08:24

## 2017-06-16 RX ADMIN — ASPIRIN SCH MG: 81 TABLET, COATED ORAL at 08:24

## 2017-06-16 RX ADMIN — THERA TABS SCH TAB: TAB at 08:24

## 2017-06-16 RX ADMIN — INSULIN ASPART SCH UNIT: 100 INJECTION, SOLUTION INTRAVENOUS; SUBCUTANEOUS at 17:01

## 2017-06-16 RX ADMIN — POLYETHYLENE GLYCOL 3350 SCH: 17 POWDER, FOR SOLUTION ORAL at 08:25

## 2017-06-16 RX ADMIN — INSULIN ASPART SCH UNIT: 100 INJECTION, SOLUTION INTRAVENOUS; SUBCUTANEOUS at 17:02

## 2017-06-16 RX ADMIN — SODIUM CHLORIDE, PRESERVATIVE FREE SCH: 5 INJECTION INTRAVENOUS at 06:51

## 2017-06-16 RX ADMIN — BUDESONIDE SCH MG: 0.5 SUSPENSION RESPIRATORY (INHALATION) at 19:30

## 2017-06-16 NOTE — PROVIDER PROGRESS NOTE
Subjective





- Prog Note Date


Prog Note Date: 06/16/17


Prog Note Time: 09:37 (seen ~ 7:10am)





- Subjective


Subjective: 





Patient denies new complaints


 here for "dehydration", and "?high sugar"   Doesnt recall specifics


2017, cant think of month, but June when triggered w/ birthday, Dino; but 

quickly corrects to Shyla john (better than yesterday)


  Per RN was incont this am of both urine and stool in bed x1





5pm later seen in chair eating dinner (several hours after seen by OT), alert, 

detail oriented, appropriate


"had a formed BM today" for the first time in eons"


usually shoots for a predinner gluc < 180 (was 211 at dinner)





Current Medications





- Current Medications


Current Medications: 








Active Medications











Generic Name Dose Route Start Last Admin





  Trade Name Freq  PRN Reason Stop Dose Admin


 


Albuterol/Ipratropium  3 ml 06/15/17 08:10 06/16/17 07:45





  Duoneb  INH   3 ml





  Q4H PRN   Administration





  Shortness of Air/Wheezing   


 


Alprazolam  0.5 mg 06/14/17 21:00 06/15/17 21:49





  Xanax  PO   0.5 mg





  BID PRN   Administration





  ANXIETY   


 


Aspirin  81 mg 06/15/17 09:00 06/16/17 08:24





  Ecotrin  PO   81 mg





  DAILY MARIA ELENA   Administration


 


Atorvastatin Calcium  40 mg 06/14/17 21:00 06/15/17 21:48





  Lipitor  PO   40 mg





  QPM MARIA ELENA   Administration


 


Budesonide  0.5 mg 06/15/17 09:15 06/16/17 07:45





  Pulmicort  INH   0.5 mg





  RTBID MARIA ELENA   Administration


 


Budesonide  9 mg 06/15/17 15:00 06/16/17 08:23





  Entocort Ec  PO   9 mg





  DAILY MARIA ELENA   Administration


 


Bupropion HCl  150 mg 06/15/17 09:00 06/16/17 08:24





  Wellbutrin Xl  PO   150 mg





  DAILY MARIA ELENA   Administration


 


Cetirizine HCl  10 mg 06/15/17 09:00 06/16/17 08:24





  Zyrtec  PO   10 mg





  DAILY MARIA ELENA   Administration


 


Hydrocortisone  10 mg 06/15/17 08:00 06/16/17 08:23





  Cortef  PO   10 mg





  QDBREAKFAST MARIA ELENA   Administration


 


Hydrocortisone  5 mg 06/15/17 17:00 06/15/17 16:30





  Cortef  PO   5 mg





  DAILY@1700 MARIA ELENA   Administration


 


Sodium Chloride  1,000 mls @ 50 mls/hr 06/15/17 20:47 06/16/17 06:51





  Normal Saline 0.9%  IV   50 mls/hr





  .Q20H MARIA ELENA   Administration


 


Insulin Aspart  10 unit 06/15/17 18:00 06/16/17 08:24





  Novolog  SUBQ   10 unit





  TIDWM MARIA ELENA   Administration





  Protocol   


 


Insulin Aspart  1 - 11 unit 06/15/17 18:00 06/16/17 08:14





  Novolog  SUBQ   Not Given





  0800,1200,1700,2100 [facility]   





  Protocol   


 


Loperamide HCl  2 mg 06/15/17 08:16 06/15/17 13:47





  Imodium  PO   2 mg





  Q2H PRN   Administration





  Diarrhea   


 


Multivitamins  1 tab 06/15/17 08:00 06/16/17 08:24





  Theragran  PO   1 tab





  DAILYWM MARIA ELENA   Administration


 


Pantoprazole Sodium  40 mg 06/14/17 21:00 06/16/17 06:49





  Protonix  PO   40 mg





  BIDAC MARIA ELENA   Administration


 


Polyethylene Glycol  17 gm 06/15/17 09:00 06/16/17 08:25





  Miralax  PO   Not Given





  DAILY MARIA ELENA   


 


Pregabalin  300 mg 06/14/17 21:00 06/15/17 21:48





  Lyrica  PO   300 mg





  QPM MARIA ELENA   Administration


 


Psyllium Hydrophilic Mucilloid  1 packet 06/15/17 14:00 06/16/17 08:23





  Metamucil  PO   1 packet





  BID MARIA ELENA   Administration


 


Ranitidine HCl  300 mg 06/14/17 21:00 06/15/17 21:49





  Zantac  PO   300 mg





  QPM MARIA ELENA   Administration


 


Sodium Chloride  10 ml 06/14/17 14:57  





  Normal Saline Flush 0.9%  IVP   





  PRN PRN   





  AS NEEDED PER PROVIDER ORDERS   


 


Sodium Chloride  10 ml 06/14/17 22:00 06/16/17 06:51





  Normal Saline Flush 0.9%  IVP   Not Given





  Q8HR MARIA ELENA   


 


Tamsulosin HCl  0.4 mg 06/14/17 21:00 06/16/17 08:24





  Flomax  PO   0.4 mg





  BID MARIA ELENA   Administration








 





Carvedilol 12.5 mg PO BID 11/11/14 


Lisinopril 20 mg PO DAILY 11/11/14 


Pantoprazole [Protonix] 40 mg PO BIDAC 11/11/14 


Ranitidine HCl 300 mg PO QPM 11/11/14 


Spironolactone 12.5 mg PO DAILY 11/11/14 


Zolpidem Tartrate 10 mg PO QPM PRN 11/11/14 


Multivitamin [Multi-Vitamin Daily] 1 each PO DAILY 11/19/14 


oxyCODONE [Roxicodone] 5 mg PO Q4H 05/04/15 


Aspirin [Aspirin EC] 81 mg PO DAILY 12/14/16 


Duloxetine HCl [Cymbalta] 60 mg PO BID 12/14/16 


Insulin Lispro [Humalog] 1 - 11 units SQ .SLIDING SCALE 12/14/16 


Pregabalin [Lyrica] 300 mg PO BID 12/14/16 


Hydrocortisone 10 mg PO QDBREAKFAST 05/08/17 


Nortriptyline HCl 20 mg PO 0800,1700 05/08/17 


Tamsulosin [Flomax] 0.4 mg PO BID 05/08/17 


metFORMIN [Glucophage] 500 mg PO BIDWM 05/08/17 


Alprazolam [Alprazolam] 0.5 mg PO BID PRN 06/14/17 


Budesonide [Entocort EC] 9 mg PO DAILY 06/14/17 


Bupropion HCl [Bupropion Xl] 150 mg PO DAILY 06/14/17 


Cetirizine [ZyrTEC] 10 mg PO DAILY 06/14/17 


Diclofenac Sodium [Voltaren] 4 gm TP QID PRN 06/14/17 


Furosemide [Furosemide] 10 mg PO DAILY 06/14/17 


Gabapentin 300 mg PO BID 06/14/17 


Hydrocortisone [Cortef] 10 mg PO DAILYWM 06/14/17 


Lidocaine Patch 5% [Lidoderm Patch] 1 each TOP DAILY PRN 06/14/17 


Simvastatin [Simvastatin] 80 mg PO QPM 06/14/17 











Objective





- Vital Signs/Intake & Output


Reviewed Vital Signs: Yes


Vital Signs: 





 Vital Signs x48h











  Temp Pulse Pulse Resp BP BP Pulse Ox


 


 06/16/17 08:13  36.4 C L   81  18  110/74   97


 


 06/16/17 07:45   78   14   


 


 06/16/17 02:55  36.4 C L   82  16   104/61  96











Intake & Output: 





 Intake & Output











 06/13/17 06/14/17 06/15/17 06/16/17





 23:59 23:59 23:59 23:59


 


Intake Total  1481 3824 952


 


Output Total  300  150


 


Balance  1181 3824 802














- Objective


General Appearance: positive: No acute distress, Other (drowsy this am when 

insulin pump beeped low battery, he was unable to manage it himself (we 

replaced it with insturction sheet))


Eyes Bilateral: positive: EOMI


Respiratory: positive: No respiratory distress, Breath sounds nml


Cardiovascular: positive: Regular rate & rhythm, No murmur


Abdomen: positive: Nml bowel sounds, Other (obese, rounded abdomen as above has)

.  negative: Tenderness


Skin: positive: Warm, Dry


Extremities: positive: No pedal edema


Neurologic/Psychiatric: positive: Other (Early this morning he was still a 

little slow on memory  Eval by OT for processing info for ADL's requested (see A

/P) When seen later in evening, he seems much sharper)





- Lab Results


Fish Bones: 


 06/15/17 05:42





 06/16/17 05:04


Other Labs: 





 Lab Results x24hrs











  06/16/17 06/16/17 06/16/17 Range/Units





  07:46 05:04 02:52 


 


Sodium   141   (135-145)  mmol/L


 


Potassium   4.7   (3.5-5.0)  mmol/L


 


Chloride   108   (101-111)  mmol/L


 


Carbon Dioxide   27   (21-32)  mmol/L


 


Anion Gap   6.0   (6-13)  


 


BUN   31 H   (6-20)  mg/dL


 


Creatinine   1.6 H   (0.6-1.2)  mg/dL


 


Estimated GFR (MDRD)   45 L   (>89)  


 


Glucose   162 H   ()  mg/dL


 


POC Whole Bld Glucose  118 H   186 H  (70 - 100)  mg/dL


 


Calcium   8.5   (8.5-10.3)  mg/dL


 


B-Natriuretic Peptide     (5-100)  pg/mL


 


Serum Ketones     (NEGATIVE)  














  06/15/17 06/15/17 06/15/17 Range/Units





  20:21 16:46 13:26 


 


Sodium     (135-145)  mmol/L


 


Potassium     (3.5-5.0)  mmol/L


 


Chloride     (101-111)  mmol/L


 


Carbon Dioxide     (21-32)  mmol/L


 


Anion Gap     (6-13)  


 


BUN     (6-20)  mg/dL


 


Creatinine     (0.6-1.2)  mg/dL


 


Estimated GFR (MDRD)     (>89)  


 


Glucose     ()  mg/dL


 


POC Whole Bld Glucose  176 H  240 H  372 H  (70 - 100)  mg/dL


 


Calcium     (8.5-10.3)  mg/dL


 


B-Natriuretic Peptide     (5-100)  pg/mL


 


Serum Ketones     (NEGATIVE)  














  06/15/17 06/15/17 06/15/17 Range/Units





  11:33 10:56 10:56 


 


Sodium     (135-145)  mmol/L


 


Potassium     (3.5-5.0)  mmol/L


 


Chloride     (101-111)  mmol/L


 


Carbon Dioxide     (21-32)  mmol/L


 


Anion Gap     (6-13)  


 


BUN     (6-20)  mg/dL


 


Creatinine     (0.6-1.2)  mg/dL


 


Estimated GFR (MDRD)     (>89)  


 


Glucose     ()  mg/dL


 


POC Whole Bld Glucose  359 H    (70 - 100)  mg/dL


 


Calcium     (8.5-10.3)  mg/dL


 


B-Natriuretic Peptide   84   (5-100)  pg/mL


 


Serum Ketones    SMALL H  (NEGATIVE)  














  06/15/17 Range/Units





  10:56 


 


Sodium  133 L  (135-145)  mmol/L


 


Potassium  5.1 H  (3.5-5.0)  mmol/L


 


Chloride  102  (101-111)  mmol/L


 


Carbon Dioxide  23  (21-32)  mmol/L


 


Anion Gap  8.0  (6-13)  


 


BUN  51 H  (6-20)  mg/dL


 


Creatinine  3.0 H  (0.6-1.2)  mg/dL


 


Estimated GFR (MDRD)  22 L  (>89)  


 


Glucose  346 H  ()  mg/dL


 


POC Whole Bld Glucose   (70 - 100)  mg/dL


 


Calcium  8.3 L  (8.5-10.3)  mg/dL


 


B-Natriuretic Peptide   (5-100)  pg/mL


 


Serum Ketones   (NEGATIVE)  














Assessment/Plan





- Problem List


(1) Acute renal failure


Impression: 


(1) Acute renal failure


Impression: 


6/16 : continues to improve markedly w/ volume repletion  5.1>> 3.7>> 3.0>> 1.6

  EGFR now 45 (12 on admit)


 BP improved, will resume coreg


 will likely be able to resume ACEI tomorrow and spironolactone (with close f/u 

of Cr next week)





6/15 Improving with volume depletion (suspect significant GI losses w/ chronic 

diarrhea (see below) and superimposed impaired renal autoregulation on ACEI, DM1

, diuretic/spironolactone


renal u/s normal





FeNa > 1 (1.7) suggesting intra renal, but response to IVF suggesting also 

prerenal component


  BUN /CR were 27/1.9 late May       Cr 5.1 yesterday>> 3.7 this morning,  3.0 

by 11am


continue IVF


holding ACEI today


holding sprionolactone 


holding metformin


holding coreg for BP


recheck in am


   





(2) Hypoglycemia


Impression: 


6/16 no further episodes of hypoglycemia with resumption of basal on his pump 

at 3.2 u/hr





6/15likely due to impaired renal clearance of insulin as above w/ much reduced 

GFR


resolved, adding back insulin as above








(3) Diabetes mellitus type 1 with complications


Impression: 


6/16   glucoses 176>> 186>> 216  with resumption of basal at 3.2 u/ hr, 10 u. 

prandial insulin, and 1/u per 20 over 140


  hopefully he will be back at is normal cognition tomorrow and able to manage 

his insulin pump





6/15A1C better than in Jan, but still poor control


Suspect recent hypoglycemia (including several ED presentations in May) due to 

impaired renal clearance


 Gluc elevated this am ~ 400 despite lantus 30 last pm, w/ incipient dka 

possibly;no anion gap, but bicarb reduced today, small serum ketones K 6.1


 Improved w/ short acting insuling 10 u. and 35 units insulin lantus and IVF.  


  much appreciate DM educator assist


 Basal back on at 3.2 u/hr 


Will not have patient do prandial or correctional insulin yet as he is still a 

little slow


stopped lantus


Prandial 10 units (for 4 gram carb diet per DM educator)


and correction 1 unit for each 20 over 140








(4) Collagenous colitis


Impression: 


uncontrolled diarrhea for multiple weeks, pt reports budesonide was stopped 

some time ago (> 1 mos ago)


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


started immodium, metamucil, 


stool cx and cdif neg


I spoke w/ GI Dr. Benitez ([facility]) from [facility] who dx'd his 

collagenous colitis


Plan:  restart his Budesonide 9 mg daily PO


cc Dr. benitez on d/c 








(5) Ischemic cardiomyopathy


Impression: 


compensated, BP improved


 will resume his coreg (and likely acei and gold tomorrow)





6/15compensated


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


holding coreg, acei, spironolactone currently for DOT, 


will reeval resuming coreg in am








(6) Hyperkalemia


Impression: 


resolved, likley related to mild incipient ketosis yesterday early





(7) Confusion


Impression: 


OT eval today for confusion and memory issues  She discussed findings below w/ 

me as well 


His xanax, ambien, tricyclic, and gabapentin and oxycodone have all been held








OT eval :"Pt is seated in chair in room, agreeable to therapy.  Pt is pleasant 

and cooperative, and answers questions appropriately.  Pt's son did arrive w/ pt

's emotional support dog, and were present during evaluation.  Pt demonstrates 

appropriate BUE AROM and strength for independence w/ grooming and ADL tasks.  

Pt completed [facility] Mental Status ([facility]) Exam, see completed 

copy in pt's chart.  Pt does demonstrate difficulty w/ short term memory, 

sequencing, reverse sequencing, and problem solving.  During assessment, pt is 

often able to identify mistakes, however is not able to self correct 

consistently.  Pt's reaction time does appear to be within normal limits, and 

his son also comments that this has improved.  


Based on today's evaluation, pt would most likely be able to complete basic 

ADLs independently and may be left alone for short periods during the day.  

However, pt does require assistance w/ all money management, medication 

management, and driving tasks, and would require increased assistance w/ 

problem solving, especially in unfamiliar situations.  Per nursing and 

hospitalist report, pt is more cognitively clear than previous day, and 

therefore is expected to continue to improve, however it is difficult to 

determine when pt will return to baseline, and may require increased family 

support in home setting for IADLs such as driving, money management, and 

medication management, including management of his insulin pump.  Patient may 

benefit from 1 more OT session to follow up during hospitalization."





6/15 primarily memory issues, and slowless w/ using iphone 


Son reported confusion w/ glucose < 100


glucose now up, suspect ambien


d/c ambien


if still slow in am (was sharp when I last met him), consider CT(cant get MR w/ 

AICD), but exam not focal





Qualifiers: 


   Acute renal failure type: unspecified   Qualified Code(s): N17.9 - Acute 

kidney failure, unspecified

## 2017-06-17 VITALS — DIASTOLIC BLOOD PRESSURE: 80 MMHG | SYSTOLIC BLOOD PRESSURE: 137 MMHG

## 2017-06-17 LAB
ANION GAP SERPL CALCULATED.4IONS-SCNC: 6 MMOL/L (ref 6–13)
BUN SERPL-MCNC: 17 MG/DL (ref 6–20)
CALCIUM UR-MCNC: 8.6 MG/DL (ref 8.5–10.3)
CHLORIDE SERPL-SCNC: 109 MMOL/L (ref 101–111)
CO2 SERPL-SCNC: 28 MMOL/L (ref 21–32)
CREAT SERPLBLD-SCNC: 1.1 MG/DL (ref 0.6–1.2)
ERYTHROCYTE [DISTWIDTH] IN BLOOD BY AUTOMATED COUNT: 13.8 % (ref 12–15)
GFRSERPLBLD MDRD-ARVRAT: 69 ML/MIN/{1.73_M2} (ref 89–?)
GLUCOSE SERPL-MCNC: 91 MG/DL (ref 70–100)
HCT VFR BLD AUTO: 32.6 % (ref 42–52)
HGB UR QL STRIP: 11.1 G/DL (ref 14–18)
MCH RBC QN AUTO: 31.4 PG (ref 27–31)
MCHC RBC AUTO-ENTMCNC: 34.1 G/DL (ref 32–36)
MCV RBC AUTO: 92.2 FL (ref 80–94)
NEUTROPHILS # SNV AUTO: 7.9 X10^3/UL (ref 4.8–10.8)
PDW BLD AUTO: 9 FL (ref 7.4–11.4)
POTASSIUM SERPL-SCNC: 4 MMOL/L (ref 3.5–5)
RBC MAR: 3.54 10^6/UL (ref 4.7–6.1)
SODIUM SERPLBLD-SCNC: 143 MMOL/L (ref 135–145)

## 2017-06-17 RX ADMIN — PSYLLIUM HUSK SCH PACKET: 3.4 POWDER ORAL at 08:35

## 2017-06-17 RX ADMIN — CARVEDILOL SCH MG: 12.5 TABLET, FILM COATED ORAL at 00:06

## 2017-06-17 RX ADMIN — IPRATROPIUM BROMIDE AND ALBUTEROL SULFATE PRN ML: 2.5; .5 SOLUTION RESPIRATORY (INHALATION) at 07:35

## 2017-06-17 RX ADMIN — CARVEDILOL SCH MG: 12.5 TABLET, FILM COATED ORAL at 08:36

## 2017-06-17 RX ADMIN — SODIUM CHLORIDE, PRESERVATIVE FREE SCH: 5 INJECTION INTRAVENOUS at 00:08

## 2017-06-17 RX ADMIN — TAMSULOSIN HYDROCHLORIDE SCH MG: 0.4 CAPSULE ORAL at 08:36

## 2017-06-17 RX ADMIN — POLYETHYLENE GLYCOL 3350 SCH: 17 POWDER, FOR SOLUTION ORAL at 08:35

## 2017-06-17 RX ADMIN — PREGABALIN SCH MG: 100 CAPSULE ORAL at 00:06

## 2017-06-17 RX ADMIN — ATORVASTATIN CALCIUM SCH MG: 40 TABLET, FILM COATED ORAL at 00:05

## 2017-06-17 RX ADMIN — BUDESONIDE SCH MG: 3 CAPSULE ORAL at 08:36

## 2017-06-17 RX ADMIN — PSYLLIUM HUSK SCH PACKET: 3.4 POWDER ORAL at 00:05

## 2017-06-17 RX ADMIN — ASPIRIN SCH MG: 81 TABLET, COATED ORAL at 08:36

## 2017-06-17 RX ADMIN — INSULIN ASPART SCH: 100 INJECTION, SOLUTION INTRAVENOUS; SUBCUTANEOUS at 08:00

## 2017-06-17 RX ADMIN — CETIRIZINE HYDROCHLORIDE SCH MG: 10 TABLET, FILM COATED ORAL at 08:36

## 2017-06-17 RX ADMIN — TAMSULOSIN HYDROCHLORIDE SCH MG: 0.4 CAPSULE ORAL at 00:06

## 2017-06-17 RX ADMIN — SODIUM CHLORIDE, PRESERVATIVE FREE SCH: 5 INJECTION INTRAVENOUS at 06:43

## 2017-06-17 RX ADMIN — INSULIN ASPART SCH: 100 INJECTION, SOLUTION INTRAVENOUS; SUBCUTANEOUS at 08:01

## 2017-06-17 RX ADMIN — PANTOPRAZOLE SODIUM SCH MG: 40 TABLET, DELAYED RELEASE ORAL at 06:50

## 2017-06-17 RX ADMIN — THERA TABS SCH TAB: TAB at 08:36

## 2017-06-17 RX ADMIN — INSULIN ASPART SCH UNIT: 100 INJECTION, SOLUTION INTRAVENOUS; SUBCUTANEOUS at 00:06

## 2017-06-17 RX ADMIN — HYDROCORTISONE SCH MG: 10 TABLET ORAL at 08:36

## 2017-06-17 RX ADMIN — SODIUM CHLORIDE SCH MLS/HR: 9 INJECTION, SOLUTION INTRAVENOUS at 03:01

## 2017-06-17 RX ADMIN — BUDESONIDE SCH MG: 0.5 SUSPENSION RESPIRATORY (INHALATION) at 07:35

## 2017-06-17 NOTE — DISCHARGE PLAN
Discharge Plan


Disposition: 01 Home, Self Care


Condition: Stable


Prescriptions: 


Budesonide [Entocort EC] 9 mg PO DAILY #60 capsule


Activity Restrictions: No Restrictions


Shower Restrictions: No


Driving Restrictions: Yes


Weight Bearing: Full Weight


Additional Instructions or Follow Up instructions: 


You were brought to the hospital after your son found you unconscious by the 

bed and your glucose was 33





1) hypoglycemia/low blood sugar;  this was likely because of acute kidney injury

/acute kidney failure from being dehydrated from diarrhea AND your blood 

pressure medications (e.g coreg, lisinopril, spironolactone) aggravating the 

"dry" condition of your kidneys


 Your kidneys metabolize insulin (and couldnt)


 As your kidney function improved we were able to get you back on  your basal 

insulin at 3.2 u/hr after discussion w Diabetes educator her, and meal based 

and correctional insulin without trouble


-your sugar was 58 this morning (slightly lower than opital for you (but you 

were lucid)


 recheck if nighttime basal needs to be rduced





2) Acute kidney injury due to


    - chronic GI (gastrointestinal) volume loss from diarrhea), dehydration 

from the diarrhea, AND combined effect of the dehydration and your blood 

pressure pills and diuretic


Creatinine was 5.1 on presentation with GFR 12 (VERY reduced flow thru kidneys)>

>>improved to  1.1 (GFR 69)


This improved MARKEDLY with IVFluid, holding your BP pills, (and slowing down 

your diarrhea)





3) Diarrhea/collagenous colitis;  you have had chronic watery bowels recently 

but havent had intervention


 stool cultures were negative


   DR. Tobias Benitez  advised resume your oral budesonide and you had a 

formed BM (!)  (we also gave you immodium, and metamucil as a bulking agent.  


  If you redevelop diarrhea take metamucil to help bulk stool, and immodium (

unless also having fever) and let PcP know if need other intervention given the 

complications you had above wit volume loss


We will send a copy of your discharge summary to him as well


If you develop constipation (!) at all on the oxycodone as needed, can add 

stool softener





4) confusion (primarily a little mental slowness and memory difficulties)


  likely due to accumulation in your system when your kidneys were slow (of the 

gabapentin, oxycodone, amitryptylline,cymbalta


  On 6/16 you were still a little slow, and MUCH improved 6/17


 Since several of your medications can have a combined effect on your cognition 

check with PCP if any of the doses could conceivably be tapered since they 

might interact.  If possible , try to reduce the frequency of the oxycodone to 

every 6 hrs if needed (but obviously if you have breakthru pain you might need 

it.  








Advised that you dont drive given you had several episodes of very low sugar 

and loss of consciousness recently and you arent having 


  If your blood sugar stays stable (consistently over 100) for some time that 

could be revisited


 


No Smoking: If you smoke, Please STOP!  Call [phone] for help.


Follow-up with: 


Leo Ray MD [Primary Care Provider] - 1-2 Days


(early next week


ensure renal function still stable (resolved DOT) w/ resumption of his home 

meds 


and that cognition still at baselilne with resumption of his home meds)

## 2017-07-06 ENCOUNTER — HOSPITAL ENCOUNTER (OUTPATIENT)
Dept: HOSPITAL 76 - LAB.WCP | Age: 57
Discharge: HOME | End: 2017-07-06
Attending: FAMILY MEDICINE
Payer: MEDICARE

## 2017-07-06 DIAGNOSIS — I25.5: ICD-10-CM

## 2017-07-06 DIAGNOSIS — E11.9: Primary | ICD-10-CM

## 2017-07-06 LAB
ALBUMIN/GLOB SERPL: 1.3 {RATIO} (ref 1–2.2)
ANION GAP SERPL CALCULATED.4IONS-SCNC: 9 MMOL/L (ref 6–13)
BASOPHILS NFR BLD AUTO: 0.1 10^3/UL (ref 0–0.1)
BASOPHILS NFR BLD AUTO: 1.1 %
BILIRUB BLD-MCNC: 0.5 MG/DL (ref 0.2–1)
BUN SERPL-MCNC: 32 MG/DL (ref 6–20)
CALCIUM UR-MCNC: 9.5 MG/DL (ref 8.5–10.3)
CHLORIDE SERPL-SCNC: 96 MMOL/L (ref 101–111)
CO2 SERPL-SCNC: 32 MMOL/L (ref 21–32)
CREAT SERPLBLD-SCNC: 1.3 MG/DL (ref 0.6–1.2)
EOSINOPHIL # BLD AUTO: 0.2 10^3/UL (ref 0–0.7)
EOSINOPHIL NFR BLD AUTO: 2 %
ERYTHROCYTE [DISTWIDTH] IN BLOOD BY AUTOMATED COUNT: 13.5 % (ref 12–15)
EST. AVERAGE GLUCOSE BLD GHB EST-MCNC: 206 MG/DL (ref 70–100)
GFRSERPLBLD MDRD-ARVRAT: 57 ML/MIN/{1.73_M2} (ref 89–?)
GLOBULIN SER-MCNC: 3.2 G/DL (ref 2.1–4.2)
GLUCOSE SERPL-MCNC: 131 MG/DL (ref 70–100)
HBA1C BLD-MCNC: 1.06 G/DL
HCT VFR BLD AUTO: 39.6 % (ref 42–52)
HGB UR QL STRIP: 13.1 G/DL (ref 14–18)
LYMPHOCYTES # SPEC AUTO: 2.8 10^3/UL (ref 1.5–3.5)
LYMPHOCYTES NFR BLD AUTO: 28.2 %
MCH RBC QN AUTO: 30.5 PG (ref 27–31)
MCHC RBC AUTO-ENTMCNC: 33 G/DL (ref 32–36)
MCV RBC AUTO: 92.4 FL (ref 80–94)
MONOCYTES # BLD AUTO: 1 10^3/UL (ref 0–1)
MONOCYTES NFR BLD AUTO: 10 %
NEUTROPHILS # BLD AUTO: 5.7 10^3/UL (ref 1.5–6.6)
NEUTROPHILS # SNV AUTO: 9.7 X10^3/UL (ref 4.8–10.8)
NEUTROPHILS NFR BLD AUTO: 58.7 %
NRBC # BLD AUTO: 0 /100WBC
PDW BLD AUTO: 9.6 FL (ref 7.4–11.4)
POTASSIUM SERPL-SCNC: 4.1 MMOL/L (ref 3.5–5)
PROT SPEC-MCNC: 7.4 G/DL (ref 6.7–8.2)
RBC MAR: 4.29 10^6/UL (ref 4.7–6.1)
SODIUM SERPLBLD-SCNC: 137 MMOL/L (ref 135–145)
WBC # BLD: 9.7 X10^3/UL

## 2017-07-06 PROCEDURE — 85025 COMPLETE CBC W/AUTO DIFF WBC: CPT

## 2017-07-06 PROCEDURE — 36415 COLL VENOUS BLD VENIPUNCTURE: CPT

## 2017-07-06 PROCEDURE — 82043 UR ALBUMIN QUANTITATIVE: CPT

## 2017-07-06 PROCEDURE — 83036 HEMOGLOBIN GLYCOSYLATED A1C: CPT

## 2017-07-06 PROCEDURE — 80053 COMPREHEN METABOLIC PANEL: CPT

## 2017-07-26 ENCOUNTER — HOSPITAL ENCOUNTER (EMERGENCY)
Dept: HOSPITAL 76 - ED | Age: 57
LOS: 1 days | Discharge: HOME | End: 2017-07-27
Payer: MEDICARE

## 2017-07-26 ENCOUNTER — HOSPITAL ENCOUNTER (OUTPATIENT)
Dept: HOSPITAL 76 - EMS | Age: 57
Discharge: HOME | End: 2017-07-26
Attending: SURGERY
Payer: MEDICARE
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
## 2017-07-26 DIAGNOSIS — Z79.4: ICD-10-CM

## 2017-07-26 DIAGNOSIS — E10.22: ICD-10-CM

## 2017-07-26 DIAGNOSIS — N40.0: ICD-10-CM

## 2017-07-26 DIAGNOSIS — I25.10: ICD-10-CM
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
## 2017-07-26 DIAGNOSIS — E27.1: ICD-10-CM

## 2017-07-26 DIAGNOSIS — I13.0: ICD-10-CM

## 2017-07-26 DIAGNOSIS — E11.649: Primary | ICD-10-CM

## 2017-07-26 DIAGNOSIS — E10.42: ICD-10-CM

## 2017-07-26 DIAGNOSIS — E10.649: Primary | ICD-10-CM

## 2017-07-26 DIAGNOSIS — K21.9: ICD-10-CM

## 2017-07-26 DIAGNOSIS — N18.4: ICD-10-CM

## 2017-07-26 DIAGNOSIS — M19.90: ICD-10-CM

## 2017-07-26 DIAGNOSIS — M54.2: ICD-10-CM

## 2017-07-26 LAB
ALBUMIN/GLOB SERPL: 1.4 {RATIO} (ref 1–2.2)
ANION GAP SERPL CALCULATED.4IONS-SCNC: 9 MMOL/L (ref 6–13)
BASOPHILS NFR BLD AUTO: 0.1 10^3/UL (ref 0–0.1)
BASOPHILS NFR BLD AUTO: 0.6 %
BILIRUB BLD-MCNC: 0.2 MG/DL (ref 0.2–1)
BUN SERPL-MCNC: 33 MG/DL (ref 6–20)
CALCIUM UR-MCNC: 9 MG/DL (ref 8.5–10.3)
CHLORIDE SERPL-SCNC: 99 MMOL/L (ref 101–111)
CO2 SERPL-SCNC: 28 MMOL/L (ref 21–32)
CREAT SERPLBLD-SCNC: 2.3 MG/DL (ref 0.6–1.2)
EOSINOPHIL # BLD AUTO: 0.1 10^3/UL (ref 0–0.7)
EOSINOPHIL NFR BLD AUTO: 1.1 %
ERYTHROCYTE [DISTWIDTH] IN BLOOD BY AUTOMATED COUNT: 13.4 % (ref 12–15)
GFRSERPLBLD MDRD-ARVRAT: 29 ML/MIN/{1.73_M2} (ref 89–?)
GLOBULIN SER-MCNC: 2.8 G/DL (ref 2.1–4.2)
GLUCOSE SERPL-MCNC: 74 MG/DL (ref 70–100)
HCT VFR BLD AUTO: 36 % (ref 42–52)
HGB UR QL STRIP: 12.4 G/DL (ref 14–18)
LIPASE SERPL-CCNC: 19 U/L (ref 22–51)
LYMPHOCYTES # SPEC AUTO: 1.8 10^3/UL (ref 1.5–3.5)
LYMPHOCYTES NFR BLD AUTO: 16.2 %
MCH RBC QN AUTO: 31.8 PG (ref 27–31)
MCHC RBC AUTO-ENTMCNC: 34.4 G/DL (ref 32–36)
MCV RBC AUTO: 92.5 FL (ref 80–94)
MONOCYTES # BLD AUTO: 1.2 10^3/UL (ref 0–1)
MONOCYTES NFR BLD AUTO: 11 %
NEUTROPHILS # BLD AUTO: 8 10^3/UL (ref 1.5–6.6)
NEUTROPHILS # SNV AUTO: 11.2 X10^3/UL (ref 4.8–10.8)
NEUTROPHILS NFR BLD AUTO: 71.1 %
NRBC # BLD AUTO: 0 /100WBC
PDW BLD AUTO: 9 FL (ref 7.4–11.4)
POTASSIUM SERPL-SCNC: 4.5 MMOL/L (ref 3.5–5)
PROT SPEC-MCNC: 6.7 G/DL (ref 6.7–8.2)
RBC MAR: 3.89 10^6/UL (ref 4.7–6.1)
SODIUM SERPLBLD-SCNC: 136 MMOL/L (ref 135–145)
WBC # BLD: 11.2 X10^3/UL

## 2017-07-26 PROCEDURE — 80053 COMPREHEN METABOLIC PANEL: CPT

## 2017-07-26 PROCEDURE — 84484 ASSAY OF TROPONIN QUANT: CPT

## 2017-07-26 PROCEDURE — 83690 ASSAY OF LIPASE: CPT

## 2017-07-26 PROCEDURE — 84443 ASSAY THYROID STIM HORMONE: CPT

## 2017-07-26 PROCEDURE — 83880 ASSAY OF NATRIURETIC PEPTIDE: CPT

## 2017-07-26 PROCEDURE — 36415 COLL VENOUS BLD VENIPUNCTURE: CPT

## 2017-07-26 PROCEDURE — 99284 EMERGENCY DEPT VISIT MOD MDM: CPT

## 2017-07-26 PROCEDURE — 85025 COMPLETE CBC W/AUTO DIFF WBC: CPT

## 2017-07-26 PROCEDURE — 93005 ELECTROCARDIOGRAM TRACING: CPT

## 2017-07-27 VITALS — SYSTOLIC BLOOD PRESSURE: 128 MMHG | DIASTOLIC BLOOD PRESSURE: 58 MMHG

## 2017-07-27 NOTE — ED PHYSICIAN DOCUMENTATION
History of Present Illness





- Stated complaint


Stated Complaint: SYNCOPAL EPISODE





- Chief complaint


Chief Complaint: General





- History obtained from


History obtained from: Patient, Family, EMS





- History of Present Illness


Timing: Today





- Additonal information


Additional information: 





Patient is a 57 year old male with a history of type 1 diabetes, heart failure 

among other comorbities who is presenting to the emergency department for 

hypoglycemia and near syncope.  According to patient and family, the patient 

was feeling light headed and checked his sugar which was 40.  patient took some 

glucose but felt like he was going to pass out.  ems was called and gave more 

glucose to the patient before bringing him to the hospital.  Upon initial 

evaluation in the emergency department patient's blood glucose normalized but 

the patient was hypotensive.  





Review of Systems


Constitutional: denies: Fever, Chills


Eyes: denies: Loss of vision, Photophobia


Ears: denies: Ear pain, Drainage/discharge


Nose: denies: Congestion


Throat: denies: Dental pain / toothache, Oral lesions / sores, Sore throat, 

Swollen tonsils


Cardiac: denies: Chest pain / pressure, Palpitations, Calf pain


Respiratory: denies: Dyspnea, Cough, Wheezing


GI: denies: Abdominal Pain, Nausea, Vomiting


: denies: Dysuria, Frequency


Musculoskeletal: denies: Neck pain, Back pain, Extremity pain


Neurologic: reports: Generalized weakness, Near syncope.  denies: Confused, 

Head injury, LOC


Immunocompromised: denies: Immunocompromised





PD PAST MEDICAL HISTORY





- Past Medical History


Cardiovascular: Hypertension, High cholesterol, Coronary artery disease, Other


Respiratory: Shortness of breath


Neuro: Peripheral neuropathy, Other


Endocrine/Autoimmune: Type 1 diabetes, Other


GI: GERD, Chronic diarrhea, Other


: Benign prostate hypertrophy, Other


HEENT: None


Psych: Depression, Anxiety, Panic attacks


Musculoskeletal: Osteoarthritis


Derm: None


Other Past Medical History: [place]'s Disease. Stage 4 Kidney disease.





- Past Surgical History


Past Surgical History: Yes


General: Colonoscopy, EGD


Ortho: Carpal Tunnel surgery, Other


/GYN: Other


Cardiovascular: Coronary stent, AICD, Cardiac catheterization


HEENT: Other





- Present Medications


Home Medications: 


 Ambulatory Orders











 Medication  Instructions  Recorded  Confirmed


 


Carvedilol 12.5 mg PO BID 11/11/14 06/14/17


 


Lisinopril 20 mg PO DAILY 11/11/14 06/14/17


 


Pantoprazole [Protonix] 40 mg PO BIDAC 11/11/14 06/14/17


 


Ranitidine HCl 300 mg PO QPM 11/11/14 06/14/17


 


Spironolactone 12.5 mg PO DAILY 11/11/14 06/14/17


 


Zolpidem Tartrate 10 mg PO QPM PRN 11/11/14 06/14/17


 


Multivitamin [Multi-Vitamin Daily] 1 each PO DAILY 11/19/14 06/14/17


 


Duloxetine HCl [Cymbalta] 60 mg PO BID 12/14/16 06/14/17


 


Insulin Lispro [Humalog] 1 - 11 units SQ .SLIDING SCALE 12/14/16 06/14/17


 


Pregabalin [Lyrica] 300 mg PO BID 12/14/16 06/14/17


 


Hydrocortisone 10 mg PO QDBREAKFAST 05/08/17 06/14/17


 


Nortriptyline HCl 20 mg PO 0800,1700 05/08/17 06/14/17


 


Tamsulosin [Flomax] 0.4 mg PO BID 05/08/17 06/14/17


 


Beclomethasone 80 Mcg [Qvar 80] 2 puffs INH BID #1 inhaler 05/14/17 06/14/17


 


Alprazolam 0.5 mg PO BID PRN 06/14/17 06/14/17


 


Budesonide [Entocort EC] 9 mg PO DAILY 06/14/17 06/14/17


 


Bupropion HCl [Bupropion Xl] 150 mg PO DAILY 06/14/17 06/14/17


 


Cetirizine [ZyrTEC] 10 mg PO DAILY 06/14/17 06/14/17


 


Diclofenac Sodium [Voltaren] 4 gm TP QID PRN 06/14/17 06/14/17


 


Gabapentin 300 mg PO BID 06/14/17 06/14/17


 


Hydrocortisone [Cortef] 10 mg PO DAILYWM 06/14/17 06/14/17


 


Lidocaine Patch 5% [Lidoderm Patch] 1 each TOP DAILY PRN 06/14/17 06/14/17


 


Simvastatin 80 mg PO QPM 06/14/17 06/14/17


 


Acetaminophen [Tylenol] 650 mg PO Q4HR PRN #0 tablet 06/17/17 


 


Aspirin [Aspirin EC] 81 mg PO BID #0  06/17/17 06/14/17


 


Budesonide [Entocort EC] 9 mg PO DAILY #60 capsule 06/17/17 


 


Loperamide [Imodium] 2 mg PO Q2H PRN #0 capsule 06/17/17 


 


Psyllium [Metamucil] 1 packet PO BID  packet 06/17/17 


 


oxyCODONE [Roxicodone] 5 mg PO Q6HR #0  06/17/17 06/14/17














- Allergies


Allergies/Adverse Reactions: 


 Allergies











Allergy/AdvReac Type Severity Reaction Status Date / Time


 


No Known Drug Allergies Allergy   Verified 06/14/17 13:16














- Social History


Does the pt smoke?: No


Smoking Status: Never smoker


Does the pt drink ETOH?: No


Does the pt have substance abuse?: No





- Immunizations


Immunizations are current?: Yes





- POLST


Patient has POLST: No





PD ED PE NORMAL





- Vitals


Vital signs reviewed: Yes





- General


General: Alert and oriented X 3, No acute distress, Well developed/nourished





- HEENT


HEENT: Atraumatic, PERRL





- Neck


Neck: Supple, no meningeal sign, No JVD





- Cardiac


Cardiac: RRR, No murmur





- Respiratory


Respiratory: No respiratory distress





- Abdomen


Abdomen: Soft, Non tender, Non distended





- Derm


Derm: Normal color, Warm and dry, No rash





- Extremities


Extremities: No deformity





- Neuro


Neuro: Alert and oriented X 3, CNs 2-12 intact, No motor deficit, No sensory 

deficit, Normal speech





- Psych


Psych: Normal mood, Normal affect





Results





- Vitals


Vitals: 





 Vital Signs - 24 hr











  07/26/17 07/26/17 07/27/17





  22:59 23:25 00:42


 


Temperature 36.5 C  


 


Heart Rate 75 75 65


 


Respiratory 16 17 20





Rate   


 


Blood Pressure 66/44 L 81/48 L 86/42 L


 


O2 Saturation 95 97 96














  07/27/17 07/27/17





  02:43 03:48


 


Temperature  


 


Heart Rate 70 74


 


Respiratory 10 L 18





Rate  


 


Blood Pressure 102/70 108/60


 


O2 Saturation 100 97








 Oxygen











O2 Source [With Activity]      Room air


 


O2 Source                      Room air

















- EKG (time done)


  ** 1409


Rate: Rate (enter#) (67)


Rhythm: NSR


Axis: Normal


Intervals: Normal ME


QRS: Normal


Ischemia: Normal ST segments


Compare to prior EKG: Unchanged from prior EKG





- Labs


Labs: 





 Laboratory Tests











  07/26/17 07/26/17 07/26/17





  23:18 23:18 23:18


 


WBC  11.2 H  


 


RBC  3.89 L  


 


Hgb  12.4 L  


 


Hct  36.0 L  


 


MCV  92.5  


 


MCH  31.8 H  


 


MCHC  34.4  


 


RDW  13.4  


 


Plt Count  225  


 


MPV  9.0  


 


Neut #  8.0 H  


 


Lymph #  1.8  


 


Mono #  1.2 H  


 


Eos #  0.1  


 


Baso #  0.1  


 


Absolute Nucleated RBC  0.00  


 


Nucleated RBCs  0.0  


 


Sodium   136 


 


Potassium   4.5 


 


Chloride   99 L 


 


Carbon Dioxide   28 


 


Anion Gap   9.0 


 


BUN   33 H 


 


Creatinine   2.3 H 


 


Estimated GFR (MDRD)   29 L 


 


Glucose   74 


 


Calcium   9.0 


 


Total Bilirubin   0.2 


 


AST   16 


 


ALT   21 


 


Alkaline Phosphatase   73 


 


Troponin I    < 0.04


 


B-Natriuretic Peptide   


 


Total Protein   6.7 


 


Albumin   3.9 


 


Globulin   2.8 


 


Albumin/Globulin Ratio   1.4 


 


Lipase   19 L 


 


TSH   














  07/26/17 07/26/17





  23:18 23:18


 


WBC  


 


RBC  


 


Hgb  


 


Hct  


 


MCV  


 


MCH  


 


MCHC  


 


RDW  


 


Plt Count  


 


MPV  


 


Neut #  


 


Lymph #  


 


Mono #  


 


Eos #  


 


Baso #  


 


Absolute Nucleated RBC  


 


Nucleated RBCs  


 


Sodium  


 


Potassium  


 


Chloride  


 


Carbon Dioxide  


 


Anion Gap  


 


BUN  


 


Creatinine  


 


Estimated GFR (MDRD)  


 


Glucose  


 


Calcium  


 


Total Bilirubin  


 


AST  


 


ALT  


 


Alkaline Phosphatase  


 


Troponin I  


 


B-Natriuretic Peptide  18 


 


Total Protein  


 


Albumin  


 


Globulin  


 


Albumin/Globulin Ratio  


 


Lipase  


 


TSH   1.54














PD MEDICAL DECISION MAKING





- ED course


Complexity details: reviewed old records, reviewed results, re-evaluated patient

, considered differential, d/w patient, d/w family


ED course: 





Patient was seen and examined at bedside.  iv access was gained and ekg was 

performed and within normal limits.  Patient was started on a fluid bolus.  

Patient's hypotension improved with the fluids.  Patient showed some decreased 

renal function, but his creatinine seemed to range anywhere from 1-5.  After 

the first liter bolus patient was treated with an additional 500ml.  Patient's 

blood pressure improved to baseline.  Patient stated he was feeling much 

better.  Upon discharge patient was awake, alert and in no distress.  patient 

was discharged home under the care of his family.   





Departure





- Departure


Disposition: 01 Home, Self Care


Clinical Impression: 


 Diabetes mellitus type 1 with complications, Hypoglycemia


Condition: Good


Instructions:  ED Diabetes Hypoglycemia Oral Agent, ED Dehydration


Follow-Up: 


primary,care provider [Other] - Tomorrow


Comments: 


Your symptoms today were likely due to low blood sugar and dehyration.  Your 

blood pressure improved with IV hydration.  You need to make sure you stay well 

hydrated and proper caloric intake.  You should follow up with your pmd this 

week to make a plan about possible medication changes if necessary.  You should 

return to the emergency department at any time for new, worsening or 

uncontrollable symptoms.

## 2017-08-05 ENCOUNTER — HOSPITAL ENCOUNTER (OUTPATIENT)
Dept: HOSPITAL 76 - EMS | Age: 57
Discharge: ANOTHER HOSPITAL | End: 2017-08-05
Attending: SURGERY
Payer: MEDICARE

## 2017-08-05 ENCOUNTER — HOSPITAL ENCOUNTER (OUTPATIENT)
Dept: HOSPITAL 76 - ED | Age: 57
Setting detail: OBSERVATION
LOS: 1 days | Discharge: HOME | End: 2017-08-06
Attending: INTERNAL MEDICINE | Admitting: INTERNAL MEDICINE
Payer: MEDICARE

## 2017-08-05 DIAGNOSIS — I25.10: ICD-10-CM

## 2017-08-05 DIAGNOSIS — Z95.5: ICD-10-CM

## 2017-08-05 DIAGNOSIS — E10.65: ICD-10-CM

## 2017-08-05 DIAGNOSIS — E27.2: Primary | ICD-10-CM

## 2017-08-05 DIAGNOSIS — N40.0: ICD-10-CM

## 2017-08-05 DIAGNOSIS — E10.42: ICD-10-CM

## 2017-08-05 DIAGNOSIS — Z79.899: ICD-10-CM

## 2017-08-05 DIAGNOSIS — E27.1: ICD-10-CM

## 2017-08-05 DIAGNOSIS — Z95.810: ICD-10-CM

## 2017-08-05 DIAGNOSIS — Z79.891: ICD-10-CM

## 2017-08-05 DIAGNOSIS — F41.0: ICD-10-CM

## 2017-08-05 DIAGNOSIS — Z87.891: ICD-10-CM

## 2017-08-05 DIAGNOSIS — K21.9: ICD-10-CM

## 2017-08-05 DIAGNOSIS — F32.9: ICD-10-CM

## 2017-08-05 DIAGNOSIS — R42: ICD-10-CM

## 2017-08-05 DIAGNOSIS — Z79.82: ICD-10-CM

## 2017-08-05 DIAGNOSIS — R53.1: ICD-10-CM

## 2017-08-05 DIAGNOSIS — K52.9: ICD-10-CM

## 2017-08-05 DIAGNOSIS — Z79.4: ICD-10-CM

## 2017-08-05 DIAGNOSIS — N17.9: ICD-10-CM

## 2017-08-05 DIAGNOSIS — R03.1: Primary | ICD-10-CM

## 2017-08-05 LAB
ALBUMIN/GLOB SERPL: 1.2 {RATIO} (ref 1–2.2)
ANION GAP SERPL CALCULATED.4IONS-SCNC: 10 MMOL/L (ref 6–13)
BASOPHILS NFR BLD AUTO: 0.1 10^3/UL (ref 0–0.1)
BASOPHILS NFR BLD AUTO: 0.6 %
BILIRUB BLD-MCNC: 0.6 MG/DL (ref 0.2–1)
BUN SERPL-MCNC: 19 MG/DL (ref 6–20)
CALCIUM UR-MCNC: 8.6 MG/DL (ref 8.5–10.3)
CHLORIDE SERPL-SCNC: 99 MMOL/L (ref 101–111)
CO2 SERPL-SCNC: 28 MMOL/L (ref 21–32)
CREAT SERPLBLD-SCNC: 1.8 MG/DL (ref 0.6–1.2)
EOSINOPHIL # BLD AUTO: 0.3 10^3/UL (ref 0–0.7)
EOSINOPHIL NFR BLD AUTO: 2 %
ERYTHROCYTE [DISTWIDTH] IN BLOOD BY AUTOMATED COUNT: 13.3 % (ref 12–15)
GFRSERPLBLD MDRD-ARVRAT: 39 ML/MIN/{1.73_M2} (ref 89–?)
GLOBULIN SER-MCNC: 2.9 G/DL (ref 2.1–4.2)
GLUCOSE SERPL-MCNC: 140 MG/DL (ref 70–100)
HCT VFR BLD AUTO: 35.2 % (ref 42–52)
HGB UR QL STRIP: 12 G/DL (ref 14–18)
LIPASE SERPL-CCNC: 24 U/L (ref 22–51)
LYMPHOCYTES # SPEC AUTO: 2 10^3/UL (ref 1.5–3.5)
LYMPHOCYTES NFR BLD AUTO: 15.1 %
MAGNESIUM SERPL-MCNC: 1.8 MG/DL (ref 1.7–2.8)
MCH RBC QN AUTO: 31.5 PG (ref 27–31)
MCHC RBC AUTO-ENTMCNC: 34.2 G/DL (ref 32–36)
MCV RBC AUTO: 92 FL (ref 80–94)
MONOCYTES # BLD AUTO: 1.1 10^3/UL (ref 0–1)
MONOCYTES NFR BLD AUTO: 8.5 %
NEUTROPHILS # BLD AUTO: 9.8 10^3/UL (ref 1.5–6.6)
NEUTROPHILS # SNV AUTO: 13.3 X10^3/UL (ref 4.8–10.8)
NEUTROPHILS NFR BLD AUTO: 73.8 %
NRBC # BLD AUTO: 0 /100WBC
PDW BLD AUTO: 8.6 FL (ref 7.4–11.4)
POTASSIUM SERPL-SCNC: 3.7 MMOL/L (ref 3.5–5)
PROT SPEC-MCNC: 6.5 G/DL (ref 6.7–8.2)
RBC MAR: 3.82 10^6/UL (ref 4.7–6.1)
SODIUM SERPLBLD-SCNC: 137 MMOL/L (ref 135–145)
WBC # BLD: 13.3 X10^3/UL

## 2017-08-05 PROCEDURE — 83690 ASSAY OF LIPASE: CPT

## 2017-08-05 PROCEDURE — 83605 ASSAY OF LACTIC ACID: CPT

## 2017-08-05 PROCEDURE — 84484 ASSAY OF TROPONIN QUANT: CPT

## 2017-08-05 PROCEDURE — 93005 ELECTROCARDIOGRAM TRACING: CPT

## 2017-08-05 PROCEDURE — 99217: CPT

## 2017-08-05 PROCEDURE — 71010: CPT

## 2017-08-05 PROCEDURE — 85025 COMPLETE CBC W/AUTO DIFF WBC: CPT

## 2017-08-05 PROCEDURE — 96361 HYDRATE IV INFUSION ADD-ON: CPT

## 2017-08-05 PROCEDURE — 83735 ASSAY OF MAGNESIUM: CPT

## 2017-08-05 PROCEDURE — 80048 BASIC METABOLIC PNL TOTAL CA: CPT

## 2017-08-05 PROCEDURE — 99218: CPT

## 2017-08-05 PROCEDURE — 99285 EMERGENCY DEPT VISIT HI MDM: CPT

## 2017-08-05 PROCEDURE — 96376 TX/PRO/DX INJ SAME DRUG ADON: CPT

## 2017-08-05 PROCEDURE — 80053 COMPREHEN METABOLIC PANEL: CPT

## 2017-08-05 PROCEDURE — 82533 TOTAL CORTISOL: CPT

## 2017-08-05 PROCEDURE — 83036 HEMOGLOBIN GLYCOSYLATED A1C: CPT

## 2017-08-05 PROCEDURE — 81001 URINALYSIS AUTO W/SCOPE: CPT

## 2017-08-05 PROCEDURE — 82947 ASSAY GLUCOSE BLOOD QUANT: CPT

## 2017-08-05 PROCEDURE — 81003 URINALYSIS AUTO W/O SCOPE: CPT

## 2017-08-05 PROCEDURE — 87086 URINE CULTURE/COLONY COUNT: CPT

## 2017-08-05 PROCEDURE — 96374 THER/PROPH/DIAG INJ IV PUSH: CPT

## 2017-08-05 PROCEDURE — 36415 COLL VENOUS BLD VENIPUNCTURE: CPT

## 2017-08-05 PROCEDURE — 96372 THER/PROPH/DIAG INJ SC/IM: CPT

## 2017-08-05 NOTE — XRAY REPORT
EXAM: 

CHEST RADIOGRAPHY

 

EXAM DATE: 8/5/2017 09:23 PM.

 

CLINICAL HISTORY: Low blood pressure

 

COMPARISON: Chest 05/14/2017.

 

TECHNIQUE: 1 view.

 

FINDINGS:

Lungs/Pleura: New pulmonary venous congestion with new mild bilateral diffuse airspace disease concer
jered for mild pulmonary edema. No pleural effusion or pneumothorax.

 

Mediastinum: Pacemaker again noted. Borderline cardiomegaly.

 

 

IMPRESSION: New pulmonary venous congestion with new mild bilateral diffuse airspace disease concerni
ng for mild pulmonary edema. Pacemaker again noted. Borderline cardiomegaly.

 

[facility]

Referring Provider Line: [phone]

 

SITE ID: [id number]

## 2017-08-05 NOTE — ED PHYSICIAN DOCUMENTATION
PD HPI SYNCOPE





- Stated complaint


Stated Complaint: LOW BP





- History obtained from


History obtained from: Patient, EMS





- History of Present Illness


Timing - onset: How many hours ago (1-2)


Duration: Hours


Preceding symptoms: Light headed, Generalized weakness.  No: Headache, Chest 

pain, Diaphoresis, Nausea / vomiting


Associated symptoms: Abdominal pain (he has had some element of left abd pain 

for few weeks due to his colitis. No recent increase in that.).  No: Headache, 

Chest pain, Palpitations, Nausea / vomiting


Contributing factors: No: Recent med change, Decreased PO intake, [place]ox[place] 

stimulae, Exertion


Injury occurred: No: Fell, Head injury


Treatment PTA: Fluids, Other (his blood sugar was okay PTA. BP low and 

persisted low by EMS.)


Similar symptoms before: Diagnosis (from medications, some from Addisons - he 

says his BP is only about 110 systolic usually.)





Review of Systems


Constitutional: denies: Fever, Chills, Myalgias


Nose: denies: Rhinorrhea / runny nose, Congestion


Throat: denies: Sore throat


Cardiac: denies: Chest pain / pressure, Palpitations


Respiratory: denies: Dyspnea, Cough


GI: reports: Abdominal Pain (regularly left sided from colitis (collagenous)).  

denies: Nausea, Vomiting, Diarrhea, Bloody / black stool


: denies: Dysuria, Frequency


Skin: denies: Rash, Lesions


Neurologic: denies: Altered mental status, Headache, Head injury


Endocrine: denies: Weight loss


Immunocompromised: denies: Immunocompromised





PD PAST MEDICAL HISTORY





- Past Medical History


Cardiovascular: Hypertension, High cholesterol, Coronary artery disease, Other


Respiratory: Shortness of breath


Neuro: Peripheral neuropathy, Other


Endocrine/Autoimmune: Type 1 diabetes, Other


GI: GERD, Chronic diarrhea, Other


: Benign prostate hypertrophy, Other


HEENT: None


Psych: Depression, Anxiety, Panic attacks


Musculoskeletal: Osteoarthritis


Derm: None





- Past Surgical History


Past Surgical History: Yes


General: Colonoscopy, EGD


Ortho: Carpal Tunnel surgery, Other


/GYN: Other


Cardiovascular: Coronary stent, AICD, Cardiac catheterization


HEENT: Other





- Present Medications


Home Medications: 


 Ambulatory Orders











 Medication  Instructions  Recorded  Confirmed


 


Carvedilol 12.5 mg PO BID 11/11/14 08/05/17


 


Lisinopril 20 mg PO DAILY 11/11/14 08/05/17


 


Pantoprazole [Protonix] 40 mg PO BIDAC 11/11/14 08/05/17


 


Ranitidine HCl 300 mg PO QPM 11/11/14 08/05/17


 


Spironolactone 12.5 mg PO DAILY 11/11/14 08/05/17


 


Zolpidem Tartrate 10 mg PO QPM PRN 11/11/14 08/05/17


 


Multivitamin [Multi-Vitamin Daily] 1 each PO DAILY 11/19/14 08/05/17


 


Duloxetine HCl [Cymbalta] 60 mg PO BID 12/14/16 08/05/17


 


Insulin Lispro [Humalog] 1 - 11 units SQ .SLIDING SCALE 12/14/16 08/05/17


 


Pregabalin [Lyrica] 300 mg PO BID 12/14/16 08/05/17


 


Nortriptyline HCl 20 mg PO 0800,1700 05/08/17 08/05/17


 


Tamsulosin [Flomax] 0.4 mg PO BID 05/08/17 08/05/17


 


Alprazolam 0.5 mg PO BID PRN 06/14/17 08/05/17


 


Bupropion HCl [Bupropion Xl] 150 mg PO DAILY 06/14/17 08/05/17


 


Cetirizine [ZyrTEC] 10 mg PO DAILY 06/14/17 08/05/17


 


Diclofenac Sodium [Voltaren] 4 gm TP QID PRN 06/14/17 08/05/17


 


Gabapentin 300 mg PO BID 06/14/17 08/05/17


 


Hydrocortisone [Cortef] 5 mg PO BID 06/14/17 08/05/17


 


Lidocaine Patch 5% [Lidoderm Patch] 1 each TOP DAILY PRN 06/14/17 08/05/17


 


Simvastatin 80 mg PO QPM 06/14/17 08/05/17


 


Budesonide [Entocort EC] 9 mg PO DAILY #60 capsule 06/17/17 08/05/17


 


Loperamide [Imodium] 2 mg PO Q2H PRN #0 capsule 06/17/17 08/05/17


 


Psyllium [Metamucil] 1 packet PO BID  packet 06/17/17 08/05/17


 


oxyCODONE [Roxicodone] 5 mg PO Q6HR #0  06/17/17 08/05/17


 


Aspirin [Aspirin EC] 81 mg PO DAILY 08/05/17 08/05/17














- Allergies


Allergies/Adverse Reactions: 


 Allergies











Allergy/AdvReac Type Severity Reaction Status Date / Time


 


No Known Drug Allergies Allergy   Verified 06/14/17 13:16














- Social History


Does the pt smoke?: No


Smoking Status: Never smoker


Does the pt drink ETOH?: No


Does the pt have substance abuse?: No





- Immunizations


Immunizations are current?: Yes





- POLST


Patient has POLST: No





PD ED PE NORMAL





- Vitals


Vital signs reviewed: Yes





- General


General: Alert and oriented X 3, No acute distress, Well developed/nourished, 

Other (has good color, conversant. )





- HEENT


HEENT: Atraumatic, Pharynx benign





- Neck


Neck: Supple, no meningeal sign, No adenopathy





- Cardiac


Cardiac: RRR, No murmur





- Respiratory


Respiratory: No: Clear bilaterally (no wheezing, unlabored breathing. Mild 

faint crackles at bases only. )





- Abdomen


Abdomen: Normal bowel sounds, Soft, Non distended





- Male 


Male : Deferred





- Rectal


Rectal: Deferred





- Back


Back: No CVA TTP





- Derm


Derm: Normal color, Warm and dry





- Extremities


Extremities: No deformity, No tenderness to palpate, Normal ROM s pain, No calf 

tenderness / cord, Other (1+ edema in both lower legs.)





- Neuro


Neuro: Alert and oriented X 3, No motor deficit, No sensory deficit, Normal 

speech





- Psych


Psych: Normal mood, Normal affect





Results





- Vitals


Vitals: 


 Vital Signs - 24 hr











  08/05/17 08/05/17 08/05/17





  20:33 21:00 21:31


 


Temperature 36.0 C L  


 


Heart Rate 88 83 79


 


Respiratory 18 18 18





Rate   


 


Blood Pressure 74/42 L 80/43 L 82/43 L


 


O2 Saturation 96 96 95














  08/05/17 08/05/17 08/05/17





  22:05 22:38 22:55


 


Temperature   


 


Heart Rate 77 73 75


 


Respiratory 18 18 20





Rate   


 


Blood Pressure 82/46 L 96/51 L 76/49 L


 


O2 Saturation 96 95 93














  08/05/17 08/05/17 08/05/17





  23:03 23:17 23:22


 


Temperature   


 


Heart Rate 75 73 71


 


Respiratory  16 





Rate   


 


Blood Pressure 81/45 L 80/46 L 83/55 L


 


O2 Saturation  92 














  08/05/17





  23:34


 


Temperature 


 


Heart Rate 75


 


Respiratory 





Rate 


 


Blood Pressure 89/50 L


 


O2 Saturation 








 Oxygen











O2 Source [With Activity]      Room air


 


O2 Source                      Room air

















- Labs


Labs: 


 Laboratory Tests











  08/05/17 08/05/17 08/05/17





  20:58 20:58 20:58


 


WBC  13.3 H  


 


RBC  3.82 L  


 


Hgb  12.0 L  


 


Hct  35.2 L  


 


MCV  92.0  


 


MCH  31.5 H  


 


MCHC  34.2  


 


RDW  13.3  


 


Plt Count  240  


 


MPV  8.6  


 


Neut #  9.8 H  


 


Lymph #  2.0  


 


Mono #  1.1 H  


 


Eos #  0.3  


 


Baso #  0.1  


 


Absolute Nucleated RBC  0.00  


 


Nucleated RBCs  0.0  


 


Sodium   137 


 


Potassium   3.7 


 


Chloride   99 L 


 


Carbon Dioxide   28 


 


Anion Gap   10.0 


 


BUN   19 


 


Creatinine   1.8 H 


 


Estimated GFR (MDRD)   39 L 


 


Glucose   140 H 


 


Lactic Acid    1.7


 


Calcium   8.6 


 


Magnesium   1.8 


 


Total Bilirubin   0.6 


 


AST   21 


 


ALT   21 


 


Alkaline Phosphatase   69 


 


Troponin I   


 


Total Protein   6.5 L 


 


Albumin   3.6 


 


Globulin   2.9 


 


Albumin/Globulin Ratio   1.2 


 


Lipase   24 














  08/05/17





  20:58


 


WBC 


 


RBC 


 


Hgb 


 


Hct 


 


MCV 


 


MCH 


 


MCHC 


 


RDW 


 


Plt Count 


 


MPV 


 


Neut # 


 


Lymph # 


 


Mono # 


 


Eos # 


 


Baso # 


 


Absolute Nucleated RBC 


 


Nucleated RBCs 


 


Sodium 


 


Potassium 


 


Chloride 


 


Carbon Dioxide 


 


Anion Gap 


 


BUN 


 


Creatinine 


 


Estimated GFR (MDRD) 


 


Glucose 


 


Lactic Acid 


 


Calcium 


 


Magnesium 


 


Total Bilirubin 


 


AST 


 


ALT 


 


Alkaline Phosphatase 


 


Troponin I  < 0.04


 


Total Protein 


 


Albumin 


 


Globulin 


 


Albumin/Globulin Ratio 


 


Lipase 














- Rads (name of study)


  ** chest


Radiology: Prelim report reviewed (mild vascular congestion)





PD MEDICAL DECISION MAKING





- ED course


Complexity details: considered differential (Very low BP without him looking bad

, talkative and good color. Seems likely Addisonian and will give fluids and 

extra steroids. He is also on BP meds (Carvedilol) and other meds with BP 

effect (Tamsulosin), so these could be contributing. No obvious infection/acute 

process to cause stress response regarding Addisons. He has not had change in 

meds or other meds to have more hypotensive effect. I was thinking he would 

rebound with fluids and some steroids, but after 2 hours, is still having low 

BP (was improved some to almost 100 but then down again). I don't want to fluid 

overload him, with CXR showing some mild vascular congestion. Will give more 

steroids. Consider pressors if not improving. Talked with Hospitalist since not 

improving in reasonable time frame. ), d/w patient





Departure





- Departure


Disposition: ED Place in Observation


Clinical Impression: 


 Near syncope, [place] disease





Hypotension


Qualifiers:


 Hypotension type: unspecified hypotension type Qualified Code(s): I95.9 - 

Hypotension, unspecified


Condition: Stable


Record reviewed to determine appropriate education?: Yes

## 2017-08-05 NOTE — XRAY PRELIMINARY REPORT
Accession: [id number]

Exam: XR Chest 1 View

 

IMPRESSION: New pulmonary venous congestion with new mild bilateral diffuse airspace disease concerni
ng for mild pulmonary edema. Pacemaker again noted. Borderline cardiomegaly.

 

[facility]

 

SITE ID: [id number]

## 2017-08-06 VITALS — SYSTOLIC BLOOD PRESSURE: 109 MMHG | DIASTOLIC BLOOD PRESSURE: 54 MMHG

## 2017-08-06 LAB
ANION GAP SERPL CALCULATED.4IONS-SCNC: 6 MMOL/L (ref 6–13)
BUN SERPL-MCNC: 22 MG/DL (ref 6–20)
CALCIUM UR-MCNC: 8.6 MG/DL (ref 8.5–10.3)
CHLORIDE SERPL-SCNC: 103 MMOL/L (ref 101–111)
CO2 SERPL-SCNC: 27 MMOL/L (ref 21–32)
CREAT SERPLBLD-SCNC: 1.3 MG/DL (ref 0.6–1.2)
CUL URINE ADD CHARGE: (no result)
EST. AVERAGE GLUCOSE BLD GHB EST-MCNC: 220 MG/DL (ref 70–100)
GFRSERPLBLD MDRD-ARVRAT: 57 ML/MIN/{1.73_M2} (ref 89–?)
GLUCOSE SERPL-MCNC: 239 MG/DL (ref 70–100)
HBA1C BLD-MCNC: 1.02 G/DL
PH UR STRIP.AUTO: 5.5 PH (ref 5–7.5)
POTASSIUM SERPL-SCNC: 4.3 MMOL/L (ref 3.5–5)
SODIUM SERPLBLD-SCNC: 136 MMOL/L (ref 135–145)
SP GR UR STRIP.AUTO: 1.01 (ref 1–1.03)
UA CHARGE (STRIP ONLY): YES
UA W/ MICROSCOPIC CHARGE: (no result)
UR CULTURE IF IND: (no result)
UROBILINOGEN UR STRIP.AUTO-MCNC: NEGATIVE MG/DL

## 2017-08-06 RX ADMIN — INSULIN ASPART SCH UNIT: 100 INJECTION, SOLUTION INTRAVENOUS; SUBCUTANEOUS at 09:27

## 2017-08-06 RX ADMIN — SODIUM CHLORIDE, PRESERVATIVE FREE SCH: 5 INJECTION INTRAVENOUS at 05:51

## 2017-08-06 RX ADMIN — INSULIN ASPART SCH: 100 INJECTION, SOLUTION INTRAVENOUS; SUBCUTANEOUS at 12:00

## 2017-08-06 RX ADMIN — SODIUM CHLORIDE, PRESERVATIVE FREE SCH: 5 INJECTION INTRAVENOUS at 14:42

## 2017-08-06 NOTE — DISCHARGE PLAN
Discharge Plan


Disposition: 01 Home, Self Care


Condition: Fair


Diet: Cardiac


Activity Restrictions: Activity as Tolerated


Shower Restrictions: No


Driving Restrictions: No


Weight Bearing: Full Weight


No Smoking: If you smoke, Please STOP!  Call [phone] for help.

## 2017-08-06 NOTE — HISTORY & PHYSICAL EXAMINATION
Chief Complaint





- Chief Complaint


Chief Complaint: Lightheaded, weak





History of Present Illness





- Admitted From


Admitted From:: ED





- History Obtained From


Records Reviewed: yes


History obtained from: patient


Exam Limitations: none





- History of Present Illness


HPI Comment/Other: 


57yoM with h/o CAD s/p stents and AICD, BPH, GERD, peripheral neuropathy, 

depression/anxiety and DM. Pt diagnosed with Addisons disease about 9 months 

ago. He has had frequent episodes of hypotension, weakness and HA over the past 

several months. He currently sees his physician about once per month. He has 

not modified his medications recently, no diet changes, no recent illnesses. He 

does report increased stress the past few days, which he hopes will be a 

temporary and quick issue. He states he had a fairly abrupt onset of LH and 

weakness last night, BS was 140s, SBP was around 85. He took his normal 

medications (hydrocortisone 5mg) and went to bed. Today, he has remained with 

LH and weakness which has not necessarily gotten worse, but is not getting 

better. He denies fever, chills, no n/v, +chronic diarrhea, no change, no melena

/hematochezia, no rash/itch, no cough, no CP, no abd pain. 





In ER he was found to have SBP in low 80s, given 2L IVF and 100mg 

hydrocortisone which initially increased his SBP, but it is again trending 

down. Pt reports continued HA (though improved from being in trendelenberg). 

Cortisol level is pending








Review of Systems





- Constitutional


Constitutional: reports: Fatigue, Weakness.  denies: Fever, Chills, Diaphoresis

, Night sweats





- Eyes


Eyes: denies: Blurred vision, Dipolpia





- Ears, Nose & Throat


Ears, Nose & Throat: reports: Tinnitus (x1yr, no change).  denies: Nasal 

congestion, Sore throat, Mouth lesions, Dental pain





- Cardiovascular


Cariovascular: reports: Lightheadedness.  denies: Palpitations, Chest pain, 

Edema, Syncope





- Respiratory


Respiratory: reports: SOB at rest.  denies: Cough, Sputum production





- Gastrointestinal


Gastrointestinal: reports: Diarrhea.  denies: Abdominal pain, Constipation, 

Nausea, Vomiting





- Genitourinary


Genitourinary: denies: Dysuria, Hematuria





- Musculoskeletal


Musculoskeletal: denies: Muscle aches, Stiffness, Joint pain





- Integumentary


Integumentary: denies: Rash, Pruritis





- Neurological


Neurological: reports: General weakness





- Psychiatric


Psychiatric: reports: Depression, Anxiety





- All Other Systems


All Other Systems: reports: Reviewed and negative





History





- Past Medical History


Cardiovascular: reports: Hypertension, High cholesterol, Coronary artery disease

, MI, Other


Respiratory: reports: Shortness of breath


Neuro: reports: Peripheral neuropathy, Other


Endocrine/Autoimmune: reports: Type 1 diabetes, Other


GI: reports: GERD, Chronic diarrhea, Other


: reports: Benign prostate hypertrophy, Renal insuffiency, Other


HEENT: reports: Other (tinnitus)


Psych: reports: Depression, Anxiety, Panic attacks


Musculoskeletal: reports: Osteoarthritis


Derm: reports: None


MRSA Hx?: No





- Past Surgical History


General: reports: Colonoscopy, EGD


Ortho: reports: Carpal Tunnel surgery, Other


/GYN: reports: Other


Cardiovascular: reports: Coronary stent, AICD, Cardiac catheterization


HEENT: reports: Other





- Family & Social History


Family History: Mother: , Cancer, Father: , COPD/Emphysema, 

Sister: Asthma, Cancer, Brother: Alive and Well


Family History Comment/Other: 





mom  of lung cancer, Dad  of COPD, 1 sister with breast cancer, 1 

sister with bladder cancer, 1 sister with asthma, brothers all healthy


Living arrangement: At home


Living Situation: Alone


Social History Notes: , 3 children, 6 grandkids.  On disability due to 

CAD





- Substance History


Use: Uses substance without health or social issues: Tobacco


Tobacco Details: Cigarettes (4-6/day, previously quit since MI in . No 

alcohol since , no recreational drugs)





- POLST


Patient has POLST: No





Meds/Allgy





- Home Medications


Home Medications: 


 Ambulatory Orders











 Medication  Instructions  Recorded  Confirmed


 


Carvedilol 12.5 mg PO BID 14


 


Lisinopril 20 mg PO DAILY 14


 


Pantoprazole [Protonix] 40 mg PO BIDAC 14


 


Ranitidine HCl 300 mg PO QPM 14


 


Spironolactone 12.5 mg PO DAILY 14


 


Zolpidem Tartrate 10 mg PO QPM PRN 14


 


Multivitamin [Multi-Vitamin Daily] 1 each PO DAILY 14


 


Duloxetine HCl [Cymbalta] 60 mg PO BID 16


 


Insulin Lispro [Humalog] 1 - 11 units SQ .SLIDING SCALE 16


 


Pregabalin [Lyrica] 300 mg PO BID 16


 


Nortriptyline HCl 20 mg PO 0800,1700 17


 


Tamsulosin [Flomax] 0.4 mg PO BID 17


 


Alprazolam 0.5 mg PO BID PRN 17


 


Bupropion HCl [Bupropion Xl] 150 mg PO DAILY 17


 


Cetirizine [ZyrTEC] 10 mg PO DAILY 17


 


Diclofenac Sodium [Voltaren] 4 gm TP QID PRN 17


 


Gabapentin 300 mg PO BID 17


 


Hydrocortisone [Cortef] 5 mg PO BID 17


 


Lidocaine Patch 5% [Lidoderm Patch] 1 each TOP DAILY PRN 17


 


Simvastatin 80 mg PO QPM 17


 


Budesonide [Entocort EC] 9 mg PO DAILY #60 capsule 17


 


Loperamide [Imodium] 2 mg PO Q2H PRN #0 capsule 17


 


Psyllium [Metamucil] 1 packet PO BID  packet 17


 


oxyCODONE [Roxicodone] 5 mg PO Q6HR #0  17


 


Aspirin [Aspirin EC] 81 mg PO DAILY 17














- Allergies


Allergies/Adverse Reactions: 


 Allergies











Allergy/AdvReac Type Severity Reaction Status Date / Time


 


No Known Drug Allergies Allergy   Verified 17 13:16














Exam





- Vital Signs


Reviewed Vital Signs: Yes


Vital Signs: 





 Vital Signs x48h











  Temp Pulse Resp BP Pulse Ox


 


 17 00:21   74  18  82/53 L 


 


 17 23:52   75   92/50 L 


 


 17 23:34   75   89/50 L 


 


 17 23:22   71   83/55 L 


 


 17 23:17   73  16  80/46 L  92


 


 17 23:03   75   81/45 L 


 


 17 22:55   75  20  76/49 L  93


 


 17 22:38   73  18  96/51 L  95


 


 17 22:05   77  18  82/46 L  96


 


 17 21:31   79  18  82/43 L  95


 


 17 21:00   83  18  80/43 L  96


 


 17 20:33  36.0 C L  88  18  74/42 L  96














- Physical Exam


General Appearance: positive: No acute distress


Eyes Bilateral: positive: PERRL, EOMI, Conjunctivae nml, No scleral icterus


ENT: positive: ENT inspection nml, Dry mucous membranes.  negative: Oral lesions


Neck: positive: Thyroid nml, No JVD.  negative: Lymphadenopathy (R), 

Lymphadenopathy (L), Stiff neck


Respiratory: positive: Chest non-tender, No respiratory distress, Breath sounds 

nml.  negative: Wheezes, Rales, Rhonchi


Cardiovascular: positive: Regular rate & rhythm, No murmur, No gallop


Peripheral Pulses: positive: 2+


Abdomen: positive: Non-tender, Nml bowel sounds, Other (mildly distended wtih 

min tympany)


Skin: positive: Color nml, Warm, Dry, Laceration (cm) (mult lesions on arms 

from dog per patient)


Extremities: positive: Non-tender, No pedal edema.  negative: Joint swelling


Neurologic/Psychiatric: positive: Oriented x3





Conclusion/Plan





- Lab Results


Lab results reviewed: Yes


Fish Bones: 


 17 20:58





 17 20:58





- Diagnostic Imaging Results


Diagnostic Imaging Results: positive: Final report reviewed


Diagnostic Imaging Results Comments: 





CXR wtih new  pulm venous congestion. +Pacer, borderline cardiomegaly





Issues/Core Measures





- Anticipated LOS


Anticipated Stay Length: Less than 2 midnights





- Issues


Hospital Issues and Management Plan: 


1. Addisons flare, precipitating events unclear, possibly due to stress. no 

recent illness, no change in medications or diet. Minimal and short lived 

response to fluids and high dose hydrocortisone in ER. 


- check random cortisol level (add to prior blood draw)


- check 8am cortisol


- will put on scheduled high dose hydrocortisone 50 q6hr for now


- once BP normalized, will change to 10am / 5pm





2. DOT - baseline Cr=1.0


- giving IVF for BP


- recheck in am





3. DMI with elevated BS


- check A1C


- accu check and SSI





4. Depression / anxiety / panic attacks


 - continue home medications





5. CAD s/p stents, AICD


- continue ASA, statin, B-block, ACE inhibitor





6. Peripheral neuropathy


 - contineu gabapentin





7. BPH 


- continue tamulosin bid





8. GERD


- continue protonix, ranitidine





9. DVT prophy


- heparin





10. Dispo - home once BP and Cr normalized 


- anticipate hospital LOS <96hrs





- DVT/VTE - Prophylaxis


VTE/DVT Device ordered at admit?: Yes

## 2017-09-05 NOTE — DISCHARGE SUMMARY
DATE OF ADMISSION: 08/05/2017

 

DATE OF DISCHARGE: 08/06/2017

 

HISTORY OF PRESENT ILLNESS: This is a 57-year-old white male with a history of diabetes, depression, 
coronary disease with stents, cardiomyopathy with a defibrillator, BPH, GERD, peripheral neuropathy, 
Rosendo disease. The patient presented with new onset of lightheadedness and weakness and low blood p
ressure found in the emergency room and he was admitted for an addisonian flareup.

 

HOSPITAL COURSE AND DISCHARGE DIAGNOSES

1. Addisonian crisis/flare up. The precipitating events are unclear, possibly due to stress, but ther
e was no recent illness or change in medications or diet. This was short-lived and responded to fluid
s for resuscitation and brief elevated doses of hydrocortisone. He was put on higher hydrocortisone o
f 50 mg q.6h and then changed to 50 mg q.12h at discharge.

2. Elevated creatinine of 1.3. His creatinine baseline is 1.0, and this was felt to be due to volume 
depletion. He was given IV fluids for replacement.

3. Diabetes. His blood sugars run 200-380 and he was treated with sliding scale insulin and his pread
mission medications and diabetic diet.

4. Depression with anxiety. This was stable during this admission and his home medications were dexter
nued.

5. Coronary artery disease with stents. His home medications were continued, and there were no compla
ints regarding this diagnosis.

6. Peripheral neuropathy. There were no complaints and the patient's gabapentin was continued.

7. Benign prostatic hypertrophy. There were no complaints and the patient's tamsulosin was continued.


 

The patient was discharged in fair condition with plan for followup with his primary care doctor.

 

 

 

DD:09/05/2017 01:47:00  DT: 09/05/2017 04:31  JOB #: [id number]  EXT JOB #:[id number]

## 2017-09-21 ENCOUNTER — HOSPITAL ENCOUNTER (EMERGENCY)
Dept: HOSPITAL 76 - ED | Age: 57
Discharge: HOME | End: 2017-09-21
Payer: MEDICARE

## 2017-09-21 VITALS — DIASTOLIC BLOOD PRESSURE: 61 MMHG | SYSTOLIC BLOOD PRESSURE: 108 MMHG

## 2017-09-21 DIAGNOSIS — Z95.810: ICD-10-CM

## 2017-09-21 DIAGNOSIS — E10.42: ICD-10-CM

## 2017-09-21 DIAGNOSIS — N28.9: ICD-10-CM

## 2017-09-21 DIAGNOSIS — I25.2: ICD-10-CM

## 2017-09-21 DIAGNOSIS — N40.0: ICD-10-CM

## 2017-09-21 DIAGNOSIS — I50.9: ICD-10-CM

## 2017-09-21 DIAGNOSIS — E78.00: ICD-10-CM

## 2017-09-21 DIAGNOSIS — Z79.4: ICD-10-CM
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
## 2017-09-21 DIAGNOSIS — Z87.891: ICD-10-CM

## 2017-09-21 DIAGNOSIS — E10.65: ICD-10-CM

## 2017-09-21 DIAGNOSIS — I11.0: ICD-10-CM

## 2017-09-21 DIAGNOSIS — T75.4XXA: Primary | ICD-10-CM

## 2017-09-21 DIAGNOSIS — I25.10: ICD-10-CM

## 2017-09-21 DIAGNOSIS — K21.9: ICD-10-CM

## 2017-09-21 DIAGNOSIS — D72.829: ICD-10-CM

## 2017-09-21 LAB
ANION GAP SERPL CALCULATED.4IONS-SCNC: 9 MMOL/L (ref 6–13)
BASOPHILS NFR BLD AUTO: 0.1 10^3/UL (ref 0–0.1)
BASOPHILS NFR BLD AUTO: 0.7 %
BUN SERPL-MCNC: 31 MG/DL (ref 6–20)
CALCIUM UR-MCNC: 9.2 MG/DL (ref 8.5–10.3)
CHLORIDE SERPL-SCNC: 94 MMOL/L (ref 101–111)
CO2 SERPL-SCNC: 31 MMOL/L (ref 21–32)
CREAT SERPLBLD-SCNC: 1.5 MG/DL (ref 0.6–1.2)
EOSINOPHIL # BLD AUTO: 0.2 10^3/UL (ref 0–0.7)
EOSINOPHIL NFR BLD AUTO: 1.2 %
ERYTHROCYTE [DISTWIDTH] IN BLOOD BY AUTOMATED COUNT: 13.9 % (ref 12–15)
GFRSERPLBLD MDRD-ARVRAT: 48 ML/MIN/{1.73_M2} (ref 89–?)
GLUCOSE SERPL-MCNC: 296 MG/DL (ref 70–100)
HCT VFR BLD AUTO: 39.6 % (ref 42–52)
HGB UR QL STRIP: 13.2 G/DL (ref 14–18)
LYMPHOCYTES # SPEC AUTO: 1.5 10^3/UL (ref 1.5–3.5)
LYMPHOCYTES NFR BLD AUTO: 10.2 %
MCH RBC QN AUTO: 30.5 PG (ref 27–31)
MCHC RBC AUTO-ENTMCNC: 33.2 G/DL (ref 32–36)
MCV RBC AUTO: 91.7 FL (ref 80–94)
MONOCYTES # BLD AUTO: 0.9 10^3/UL (ref 0–1)
MONOCYTES NFR BLD AUTO: 6.3 %
NEUTROPHILS # BLD AUTO: 11.8 10^3/UL (ref 1.5–6.6)
NEUTROPHILS # SNV AUTO: 14.4 X10^3/UL (ref 4.8–10.8)
NEUTROPHILS NFR BLD AUTO: 81.6 %
NRBC # BLD AUTO: 0.2 /100WBC
PDW BLD AUTO: 8.9 FL (ref 7.4–11.4)
POTASSIUM SERPL-SCNC: 5.1 MMOL/L (ref 3.5–5)
RBC MAR: 4.32 10^6/UL (ref 4.7–6.1)
SODIUM SERPLBLD-SCNC: 134 MMOL/L (ref 135–145)
WBC # BLD: 14.4 X10^3/UL

## 2017-09-21 PROCEDURE — 82550 ASSAY OF CK (CPK): CPT

## 2017-09-21 PROCEDURE — 99284 EMERGENCY DEPT VISIT MOD MDM: CPT

## 2017-09-21 PROCEDURE — 85025 COMPLETE CBC W/AUTO DIFF WBC: CPT

## 2017-09-21 PROCEDURE — 36415 COLL VENOUS BLD VENIPUNCTURE: CPT

## 2017-09-21 PROCEDURE — 93005 ELECTROCARDIOGRAM TRACING: CPT

## 2017-09-21 PROCEDURE — 80048 BASIC METABOLIC PNL TOTAL CA: CPT

## 2017-09-21 PROCEDURE — 99283 EMERGENCY DEPT VISIT LOW MDM: CPT
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
## 2017-09-26 ENCOUNTER — HOSPITAL ENCOUNTER (EMERGENCY)
Dept: HOSPITAL 76 - ED | Age: 57
Discharge: HOME | End: 2017-09-26
Payer: MEDICARE

## 2017-09-26 VITALS — SYSTOLIC BLOOD PRESSURE: 111 MMHG | DIASTOLIC BLOOD PRESSURE: 75 MMHG

## 2017-09-26 DIAGNOSIS — Z79.82: ICD-10-CM
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
## 2017-09-26 DIAGNOSIS — S20.212A: ICD-10-CM

## 2017-09-26 DIAGNOSIS — I25.10: ICD-10-CM

## 2017-09-26 DIAGNOSIS — E78.00: ICD-10-CM

## 2017-09-26 DIAGNOSIS — E10.9: ICD-10-CM

## 2017-09-26 DIAGNOSIS — S80.811A: Primary | ICD-10-CM

## 2017-09-26 DIAGNOSIS — Z87.891: ICD-10-CM

## 2017-09-26 DIAGNOSIS — I11.9: ICD-10-CM

## 2017-09-26 DIAGNOSIS — I25.2: ICD-10-CM

## 2017-09-26 PROCEDURE — 71101 X-RAY EXAM UNILAT RIBS/CHEST: CPT

## 2017-09-26 PROCEDURE — 99283 EMERGENCY DEPT VISIT LOW MDM: CPT

## 2017-09-26 NOTE — ED PHYSICIAN DOCUMENTATION
PD HPI LOWER EXT INJURY





- Stated complaint


Stated Complaint: GLF/LAC RT SHIN/LT RIB PX





- Chief complaint


Chief Complaint: General





- History obtained from


History obtained from: Patient





- History of Present Illness


PD HPI LOW EXT INJURY LOCATION: Left, Other (chest, lateral and posterior.)


Type of injury: Fall


Timing - onset: Today


Timing - details: Abrupt onset, Still present


Worsened by: Moving, Palpating, Other (deep breathing)


Associated symptoms: No: Weakness, Numbness


Contributing factors: No: Anticoagulated


Similar symptoms before: Has not had sx before


Recently seen: Not recently seen





Review of Systems


Throat: denies: Swollen tonsils


Cardiac: reports: Chest pain / pressure.  denies: Palpitations


Respiratory: denies: Dyspnea





PD PAST MEDICAL HISTORY





- Past Medical History


Cardiovascular: Congestive heart failure, Hypertension, High cholesterol, 

Coronary artery disease, MI, Other


Respiratory: Shortness of breath


Neuro: Headache/migraine, Peripheral neuropathy, Other


Endocrine/Autoimmune: Type 1 diabetes, Other


GI: GERD, Chronic diarrhea, Other


: Benign prostate hypertrophy, Renal insuffiency, Other


HEENT: Other


Psych: Depression, Anxiety, Panic attacks, Post traumatic stress disorder


Musculoskeletal: Osteoarthritis


Derm: None





- Past Surgical History


Past Surgical History: Yes


General: Colonoscopy, EGD


Ortho: Carpal Tunnel surgery, Other


/GYN: Other


Cardiovascular: Coronary stent, AICD, Cardiac catheterization


HEENT: Other





- Present Medications


Home Medications: 


 Ambulatory Orders











 Medication  Instructions  Recorded  Confirmed


 


Carvedilol 12.5 mg PO BID 11/11/14 09/26/17


 


Lisinopril 20 mg PO DAILY 11/11/14 09/26/17


 


Pantoprazole [Protonix] 40 mg PO BIDAC 11/11/14 09/26/17


 


Ranitidine HCl 300 mg PO QPM 11/11/14 09/26/17


 


Spironolactone 12.5 mg PO DAILY 11/11/14 09/26/17


 


Zolpidem Tartrate 10 mg PO QPM PRN 11/11/14 09/26/17


 


Insulin Lispro [Humalog] 1 - 11 units SQ .SLIDING SCALE 12/14/16 09/26/17


 


Pregabalin [Lyrica] 300 mg PO BID 12/14/16 09/26/17


 


Nortriptyline HCl 20 mg PO 0800,1700 05/08/17 09/26/17


 


Tamsulosin [Flomax] 0.4 mg PO BID 05/08/17 09/26/17


 


Alprazolam 0.5 mg PO BID PRN 06/14/17 09/26/17


 


Cetirizine [ZyrTEC] 10 mg PO DAILY 06/14/17 09/26/17


 


Diclofenac Sodium [Voltaren] 4 gm TP QID PRN 06/14/17 09/26/17


 


Lidocaine Patch 5% [Lidoderm Patch] 1 each TOP DAILY PRN 06/14/17 09/26/17


 


Simvastatin 80 mg PO QPM 06/14/17 09/26/17


 


Budesonide [Entocort EC] 9 mg PO DAILY #60 capsule 06/17/17 09/26/17


 


Loperamide [Imodium] 2 mg PO Q2H PRN #0 capsule 06/17/17 09/26/17


 


Psyllium [Metamucil] 1 packet PO BID  packet 06/17/17 09/26/17


 


oxyCODONE [Roxicodone] 5 mg PO Q6HR #0 06/17/17 09/26/17


 


Acetaminophen [Tylenol] 650 mg PO Q4HR PRN #0 tablet 08/06/17 09/26/17


 


Aspirin Chewable [St Kwadwo 81 mg PO DAILY  tablet 08/06/17 09/26/17





Aspirin]   


 


DULoxetine [Cymbalta] 60 mg PO BID  capsule 08/06/17 09/26/17


 


Gabapentin [Neurontin] 300 mg PO BID  capsule 08/06/17 09/26/17


 


Insulin Aspart [NovoLOG] 1 - 9 unit SUBQ 08/06/17 09/26/17





 0800,1200,1700,2100  pen  


 


buPROPion [Wellbutrin Xl] 150 mg PO DAILY  tablet 08/06/17 09/26/17


 


Morphine Sulfate 15 mg PO TID PRN #15 tablet 09/26/17 














- Allergies


Allergies/Adverse Reactions: 


 Allergies











Allergy/AdvReac Type Severity Reaction Status Date / Time


 


No Known Drug Allergies Allergy   Verified 06/14/17 13:16














- Social History


Does the pt smoke?: No


Smoking Status: Former smoker


Does the pt drink ETOH?: No


Does the pt have substance abuse?: No





- Immunizations


Immunizations are current?: Yes





- POLST


Patient has POLST: No





PD ED PE NORMAL





- Vitals


Vital signs reviewed: Yes





- General


General: Alert and oriented X 3, No acute distress, Well developed/nourished





- HEENT


HEENT: Pharynx benign





- Neck


Neck: Supple, no meningeal sign, No bony TTP, No adenopathy





- Cardiac


Cardiac: RRR, No murmur





- Respiratory


Respiratory: No respiratory distress, Clear bilaterally, Other (some chestwall 

tenderness left lateral ribs/chest about level T1)





- Abdomen


Abdomen: Soft, Non tender





- Back


Back: No CVA TTP





- Derm


Derm: Normal color, Warm and dry





- Extremities


Extremities: No tenderness to palpate, Normal ROM s pain, No edema, No calf 

tenderness / cord, Other (right anterior shin with superficial laceration, more 

of peeled skin without extension to fatty tissue. No bleeding nor FB. )





Results





- Vitals


Vitals: 


 Oxygen











O2 Source [With Activity]      Room air


 


O2 Source                      Room air

















- Rads (name of study)


  ** chest with ribs


Radiology: Prelim report reviewed (no fractures, normal lungs. )





PD MEDICAL DECISION MAKING





- ED course


Complexity details: reviewed results, considered differential (he takes pain 

meds regularly so will have some tolerance. can add different pain meds to help 

with acute process. ), d/w patient





Departure





- Departure


Disposition: 01 Home, Self Care


Clinical Impression: 


Lower leg abrasion


Qualifiers:


 Encounter type: initial encounter Laterality: right Qualified Code(s): 

S80.811A - Abrasion, right lower leg, initial encounter





Chest wall contusion


Qualifiers:


 Encounter type: initial encounter Laterality: left Qualified Code(s): S20.212A 

- Contusion of left front wall of thorax, initial encounter





Condition: Stable


Record reviewed to determine appropriate education?: Yes


Instructions:  ED Abrasion, ED Contusion Chest Wall


Follow-Up: 


Leo Ray MD [Primary Care Provider] - 


Prescriptions: 


Morphine Sulfate 15 mg PO TID PRN #15 tablet


 PRN Reason: Pain


Comments: 


Continue usual medications.  There is not any obvious broken ribs on chest x-

ray.  There is a few percent chance of an occult hairline fracture.  I would 

take a little bit longer to heal.  Otherwise the chest wall contusion should 

improve over several days to week.  In addition to usual pain medications, add 

warfarin tablet 3 times a day if needed.  For the abrasion on the leg, clean it 

with soap and water couple times a day and apply ointment.  Recheck if signs of 

infection.  Follow-up with your primary care if not improved over the next 5 or 

6 days.


Discharge Date/Time: 09/26/17 18:50

## 2017-09-26 NOTE — XRAY REPORT
EXAM:

LEFT RIB RADIOGRAPHY

 

EXAM DATE: 9/26/2017 05:42 PM.

 

CLINICAL HISTORY: GLF rib pain.

 

COMPARISON: Chest dated 08/05/2017.

 

TECHNIQUE: 1 view of the chest and 2 views of the ribs.

 

FINDINGS: 

Bones: Degenerative changes. No definite fracture or other bone lesion.

 

Lungs: Prominent atelectasis in the left base. No consolidation, effusion, or pneumothorax.

 

Mediastinum: Normal heart size. Upper lobe vessels not distended.

 

Other: Permanent pacemaker on the left with intact lead.

 

IMPRESSION: Left basilar atelectasis. Negative ribs.

 

[facility]

Referring Provider Line: [phone]

 

SITE ID: [id number]

## 2017-09-26 NOTE — XRAY PRELIMINARY REPORT
Accession: [id number]

Exam: XR Ribs w/PA Chest LT

 

IMPRESSION: Left basilar atelectasis. Negative ribs.

 

[facility]

 

SITE ID: [id number]

## 2017-10-04 ENCOUNTER — HOSPITAL ENCOUNTER (OUTPATIENT)
Dept: HOSPITAL 76 - LAB.WCP | Age: 57
Discharge: HOME | End: 2017-10-04
Attending: FAMILY MEDICINE
Payer: MEDICARE

## 2017-10-04 DIAGNOSIS — E11.9: Primary | ICD-10-CM

## 2017-10-04 DIAGNOSIS — I25.10: ICD-10-CM

## 2017-10-04 LAB
ALBUMIN/GLOB SERPL: 1.3 {RATIO} (ref 1–2.2)
ANION GAP SERPL CALCULATED.4IONS-SCNC: 8 MMOL/L (ref 6–13)
BILIRUB BLD-MCNC: 0.6 MG/DL (ref 0.2–1)
BUN SERPL-MCNC: 21 MG/DL (ref 6–20)
CALCIUM UR-MCNC: 8.7 MG/DL (ref 8.5–10.3)
CHLORIDE SERPL-SCNC: 97 MMOL/L (ref 101–111)
CHOLEST SERPL-MCNC: 144 MG/DL
CO2 SERPL-SCNC: 30 MMOL/L (ref 21–32)
CREAT SERPLBLD-SCNC: 1.1 MG/DL (ref 0.6–1.2)
EST. AVERAGE GLUCOSE BLD GHB EST-MCNC: 240 MG/DL (ref 70–100)
GFRSERPLBLD MDRD-ARVRAT: 69 ML/MIN/{1.73_M2} (ref 89–?)
GLOBULIN SER-MCNC: 3 G/DL (ref 2.1–4.2)
GLUCOSE SERPL-MCNC: 108 MG/DL (ref 70–100)
HBA1C BLD-MCNC: 1.11 G/DL
HDLC SERPL-MCNC: 45 MG/DL
HDLC SERPL: 3.2 {RATIO} (ref ?–5)
LDLC/HDLC SERPL: 1.6 {RATIO} (ref ?–3.6)
POTASSIUM SERPL-SCNC: 3.7 MMOL/L (ref 3.5–5)
PROT SPEC-MCNC: 6.8 G/DL (ref 6.7–8.2)
SODIUM SERPLBLD-SCNC: 135 MMOL/L (ref 135–145)
TRIGL P FAST SERPL-MCNC: 123 MG/DL
VLDLC SERPL-SCNC: 25 MG/DL

## 2017-10-04 PROCEDURE — 84443 ASSAY THYROID STIM HORMONE: CPT

## 2017-10-04 PROCEDURE — 80053 COMPREHEN METABOLIC PANEL: CPT

## 2017-10-04 PROCEDURE — 80061 LIPID PANEL: CPT

## 2017-10-04 PROCEDURE — 36415 COLL VENOUS BLD VENIPUNCTURE: CPT

## 2017-10-04 PROCEDURE — 83036 HEMOGLOBIN GLYCOSYLATED A1C: CPT

## 2017-10-13 ENCOUNTER — HOSPITAL ENCOUNTER (EMERGENCY)
Dept: HOSPITAL 76 - ED | Age: 57
Discharge: HOME | End: 2017-10-13
Payer: MEDICARE

## 2017-10-13 VITALS — SYSTOLIC BLOOD PRESSURE: 130 MMHG | DIASTOLIC BLOOD PRESSURE: 84 MMHG

## 2017-10-13 DIAGNOSIS — E10.42: ICD-10-CM

## 2017-10-13 DIAGNOSIS — K21.9: ICD-10-CM

## 2017-10-13 DIAGNOSIS — Z79.82: ICD-10-CM

## 2017-10-13 DIAGNOSIS — S90.415A: Primary | ICD-10-CM

## 2017-10-13 DIAGNOSIS — Z95.810: ICD-10-CM

## 2017-10-13 DIAGNOSIS — E78.00: ICD-10-CM

## 2017-10-13 DIAGNOSIS — I11.0: ICD-10-CM

## 2017-10-13 DIAGNOSIS — Z79.4: ICD-10-CM

## 2017-10-13 DIAGNOSIS — I25.10: ICD-10-CM
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
## 2017-10-13 DIAGNOSIS — N40.0: ICD-10-CM

## 2017-10-13 DIAGNOSIS — I50.9: ICD-10-CM

## 2017-10-13 DIAGNOSIS — M19.90: ICD-10-CM

## 2017-10-13 DIAGNOSIS — N28.9: ICD-10-CM

## 2017-10-13 PROCEDURE — 99281 EMR DPT VST MAYX REQ PHY/QHP: CPT

## 2017-10-13 PROCEDURE — 99283 EMERGENCY DEPT VISIT LOW MDM: CPT

## 2017-10-13 NOTE — ED PHYSICIAN DOCUMENTATION
History of Present Illness





- Stated complaint


Stated Complaint: WOUND CHECK





- Chief complaint


Chief Complaint: Ext Problem





- Additonal information


Additional information: 





pt was trimming skin and caused a tissue abrasion avulsion to 4th toe





Review of Systems


Skin: reports: Abrasion (s)





PD PAST MEDICAL HISTORY





- Past Medical History


Past Medical History: Yes


Cardiovascular: Congestive heart failure, Hypertension, High cholesterol, 

Coronary artery disease, MI, Other


Respiratory: Shortness of breath


Neuro: Headache/migraine, Peripheral neuropathy, Other


Endocrine/Autoimmune: Type 1 diabetes, Other


GI: GERD, Chronic diarrhea, Other


: Benign prostate hypertrophy, Renal insuffiency, Other


HEENT: Other


Psych: Depression, Anxiety, Panic attacks, Post traumatic stress disorder


Musculoskeletal: Osteoarthritis


Derm: None





- Past Surgical History


Past Surgical History: Yes


General: Colonoscopy, EGD


Ortho: Carpal Tunnel surgery, Other


/GYN: Other


Cardiovascular: Coronary stent, AICD, Cardiac catheterization


HEENT: Other





- Present Medications


Home Medications: 


 Ambulatory Orders











 Medication  Instructions  Recorded  Confirmed


 


Carvedilol 12.5 mg PO BID 11/11/14 09/26/17


 


Lisinopril 20 mg PO DAILY 11/11/14 09/26/17


 


Pantoprazole [Protonix] 40 mg PO BIDAC 11/11/14 09/26/17


 


Ranitidine HCl 300 mg PO QPM 11/11/14 09/26/17


 


Spironolactone 12.5 mg PO DAILY 11/11/14 09/26/17


 


Zolpidem Tartrate 10 mg PO QPM PRN 11/11/14 09/26/17


 


Insulin Lispro [Humalog] 1 - 11 units SQ .SLIDING SCALE 12/14/16 09/26/17


 


Pregabalin [Lyrica] 300 mg PO BID 12/14/16 09/26/17


 


Nortriptyline HCl 20 mg PO 0800,1700 05/08/17 09/26/17


 


Tamsulosin [Flomax] 0.4 mg PO BID 05/08/17 09/26/17


 


Alprazolam 0.5 mg PO BID PRN 06/14/17 09/26/17


 


Cetirizine [ZyrTEC] 10 mg PO DAILY 06/14/17 09/26/17


 


Diclofenac Sodium [Voltaren] 4 gm TP QID PRN 06/14/17 09/26/17


 


Lidocaine Patch 5% [Lidoderm Patch] 1 each TOP DAILY PRN 06/14/17 09/26/17


 


Simvastatin 80 mg PO QPM 06/14/17 09/26/17


 


Budesonide [Entocort EC] 9 mg PO DAILY #60 capsule 06/17/17 09/26/17


 


Loperamide [Imodium] 2 mg PO Q2H PRN #0 capsule 06/17/17 09/26/17


 


Psyllium [Metamucil] 1 packet PO BID  packet 06/17/17 09/26/17


 


oxyCODONE [Roxicodone] 5 mg PO Q6HR #0 06/17/17 09/26/17


 


Acetaminophen [Tylenol] 650 mg PO Q4HR PRN #0 tablet 08/06/17 09/26/17


 


Aspirin Chewable [St Kwadwo 81 mg PO DAILY  tablet 08/06/17 09/26/17





Aspirin]   


 


DULoxetine [Cymbalta] 60 mg PO BID  capsule 08/06/17 09/26/17


 


Gabapentin [Neurontin] 300 mg PO BID  capsule 08/06/17 09/26/17


 


Insulin Aspart [NovoLOG] 1 - 9 unit SUBQ 08/06/17 09/26/17





 0800,1200,1700,2100  pen  


 


buPROPion [Wellbutrin Xl] 150 mg PO DAILY  tablet 08/06/17 09/26/17


 


Morphine Sulfate 15 mg PO TID PRN #15 tablet 09/26/17 














- Allergies


Allergies/Adverse Reactions: 


 Allergies











Allergy/AdvReac Type Severity Reaction Status Date / Time


 


No Known Drug Allergies Allergy   Verified 10/13/17 15:48














- Social History


Does the pt smoke?: No


Smoking Status: Never smoker


Does the pt drink ETOH?: No


Does the pt have substance abuse?: No





- Immunizations


Immunizations are current?: Yes





- POLST


Patient has POLST: No





PD ED PE NORMAL





- Vitals


Vital signs reviewed: Yes





- Extremities


Extremities: Other (L foot, small "divot" where tissue was avulsed off 4th toe 

in between 4th and 3rd toes, no active bleeding now, MSV intact)





Results





- Vitals


Vitals: 





 Vital Signs - 24 hr











  10/13/17





  15:46


 


Temperature 35.6 C L


 


Heart Rate 96


 


Respiratory 18





Rate 


 


Blood Pressure 128/62


 


O2 Saturation 97








 Oxygen











O2 Source [With Activity]      Room air


 


O2 Source                      Room air

















Departure





- Departure


Disposition: 01 Home, Self Care


Clinical Impression: 


 Abrasion





Condition: Good


Instructions:  ED Abrasion


Comments: 


Leave the dressing on for 24-48 hr to prevent further bleeding.


After that may remove the dressing - recommend soaking it to remove - and then 

apply antibiotic ointment twice daily till healed

## 2017-10-18 ENCOUNTER — HOSPITAL ENCOUNTER (OUTPATIENT)
Dept: HOSPITAL 76 - LAB.WCP | Age: 57
Discharge: HOME | End: 2017-10-18
Attending: FAMILY MEDICINE
Payer: MEDICARE

## 2017-10-18 DIAGNOSIS — Z87.898: ICD-10-CM

## 2017-10-18 DIAGNOSIS — E66.01: ICD-10-CM

## 2017-10-18 DIAGNOSIS — E10.9: ICD-10-CM

## 2017-10-18 DIAGNOSIS — I25.2: Primary | ICD-10-CM

## 2017-10-18 DIAGNOSIS — Z95.5: ICD-10-CM

## 2017-10-18 DIAGNOSIS — Z12.5: ICD-10-CM

## 2017-10-18 DIAGNOSIS — E78.5: ICD-10-CM

## 2017-10-18 DIAGNOSIS — Z95.810: ICD-10-CM

## 2017-10-18 DIAGNOSIS — I25.5: ICD-10-CM

## 2017-10-18 LAB
ANION GAP SERPL CALCULATED.4IONS-SCNC: 8 MMOL/L (ref 6–13)
BUN SERPL-MCNC: 29 MG/DL (ref 6–20)
CALCIUM UR-MCNC: 8.8 MG/DL (ref 8.5–10.3)
CHLORIDE SERPL-SCNC: 96 MMOL/L (ref 101–111)
CHOLEST SERPL-MCNC: 153 MG/DL
CO2 SERPL-SCNC: 27 MMOL/L (ref 21–32)
CREAT SERPLBLD-SCNC: 1.7 MG/DL (ref 0.6–1.2)
GFRSERPLBLD MDRD-ARVRAT: 42 ML/MIN/{1.73_M2} (ref 89–?)
GLUCOSE SERPL-MCNC: 190 MG/DL (ref 70–100)
HDLC SERPL-MCNC: 46 MG/DL
HDLC SERPL: 3.3 {RATIO} (ref ?–5)
LDLC/HDLC SERPL: 1.5 {RATIO} (ref ?–3.6)
POTASSIUM SERPL-SCNC: 4.1 MMOL/L (ref 3.5–5)
SODIUM SERPLBLD-SCNC: 131 MMOL/L (ref 135–145)
TRIGL P FAST SERPL-MCNC: 196 MG/DL
VLDLC SERPL-SCNC: 39 MG/DL

## 2017-10-18 PROCEDURE — 84153 ASSAY OF PSA TOTAL: CPT

## 2017-10-18 PROCEDURE — 80048 BASIC METABOLIC PNL TOTAL CA: CPT

## 2017-10-18 PROCEDURE — 80061 LIPID PANEL: CPT

## 2017-10-18 PROCEDURE — 36415 COLL VENOUS BLD VENIPUNCTURE: CPT

## 2017-10-22 ENCOUNTER — HOSPITAL ENCOUNTER (OUTPATIENT)
Dept: HOSPITAL 76 - DI | Age: 57
Discharge: HOME | End: 2017-10-22
Attending: FAMILY MEDICINE
Payer: MEDICARE

## 2017-10-22 DIAGNOSIS — N43.3: Primary | ICD-10-CM

## 2017-10-22 DIAGNOSIS — N50.812: ICD-10-CM

## 2017-10-22 PROCEDURE — 76870 US EXAM SCROTUM: CPT

## 2017-10-22 PROCEDURE — 93975 VASCULAR STUDY: CPT

## 2017-10-23 NOTE — ULTRASOUND REPORT
EXAM:

SCROTAL ULTRASOUND

 

EXAM DATE: 10/22/2017 01:19 PM.

 

CLINICAL HISTORY: TESTICULAR PAIN, LEFT. History of multiple portions with corrective surgery and hyp
ospadias

 

COMPARISON: None.

 

TECHNIQUE: Real-time scanning was performed with static images obtained. Both color-flow and Doppler 
spectral analysis were utilized.

 

FINDINGS:

Right:

Testis: 5.5 x 2.5 x 3.3 cm. Normal size and echotexture. No mass, calcification, or abnormal blood fl
ow.

Epididymis head: 0.9 x 0.4 x 1 cm. Normal size and echotexture. No mass or abnormal blood flow.

Hydrocele: Minimal

Varicocele: None.

 

Left:

Testis: 6.3 x 3.2 x 1.9 cm. Echotexture is within normal limits. Left testicle is located inferiorly 
in the scrotum and appears elongated. No mass, calcification, or abnormal blood flow.

Epididymis head: 1.4 x 0.9 x 0.9 cm. Normal size and echotexture. No mass or abnormal blood flow. 

Hydrocele: None.

Varicocele: None.

 

IMPRESSION: 

1. No evidence for testicular torsion or mass. 

2. No acute findings are seen. 

3. Minimal right hydrocele.

4. Elongated appearing left testicle located inferiorly in the scrotum.

 

 

[facility]

Referring Provider Line: [phone]

 

SITE ID: [id number]

## 2017-11-10 ENCOUNTER — HOSPITAL ENCOUNTER (OUTPATIENT)
Dept: HOSPITAL 76 - EMS | Age: 57
Discharge: ANOTHER HOSPITAL | End: 2017-11-10
Attending: SURGERY
Payer: MEDICARE

## 2017-11-10 DIAGNOSIS — I95.9: Primary | ICD-10-CM

## 2017-11-25 ENCOUNTER — HOSPITAL ENCOUNTER (EMERGENCY)
Dept: HOSPITAL 76 - ED | Age: 57
Discharge: HOME | End: 2017-11-25
Payer: MEDICARE

## 2017-11-25 VITALS — DIASTOLIC BLOOD PRESSURE: 68 MMHG | SYSTOLIC BLOOD PRESSURE: 120 MMHG

## 2017-11-25 DIAGNOSIS — E78.00: ICD-10-CM

## 2017-11-25 DIAGNOSIS — M54.6: Primary | ICD-10-CM

## 2017-11-25 DIAGNOSIS — E10.22: ICD-10-CM

## 2017-11-25 DIAGNOSIS — E10.42: ICD-10-CM

## 2017-11-25 DIAGNOSIS — Z79.82: ICD-10-CM

## 2017-11-25 DIAGNOSIS — K21.9: ICD-10-CM

## 2017-11-25 DIAGNOSIS — I25.10: ICD-10-CM

## 2017-11-25 DIAGNOSIS — N18.9: ICD-10-CM

## 2017-11-25 DIAGNOSIS — Z79.4: ICD-10-CM

## 2017-11-25 DIAGNOSIS — I13.0: ICD-10-CM

## 2017-11-25 DIAGNOSIS — I50.9: ICD-10-CM

## 2017-11-25 DIAGNOSIS — I25.2: ICD-10-CM

## 2017-11-25 DIAGNOSIS — M19.90: ICD-10-CM

## 2017-11-25 DIAGNOSIS — N40.0: ICD-10-CM

## 2017-11-25 PROCEDURE — 96372 THER/PROPH/DIAG INJ SC/IM: CPT

## 2017-11-25 PROCEDURE — 71020: CPT

## 2017-11-25 PROCEDURE — 99283 EMERGENCY DEPT VISIT LOW MDM: CPT

## 2017-11-25 NOTE — ED PHYSICIAN DOCUMENTATION
PD HPI BACK PAIN





- Stated complaint


Stated Complaint: BACK PX





- Chief complaint


Chief Complaint: Back Pain





- History obtained from


History obtained from: Patient





- History of Present Illness


Timing - onset: Last night


Timing - duration: Days (1)


Timing - details: Abrupt onset, Still present


Quality: Spasm, Sharp, Tearing


Associated symptoms: No: Fever, Weakness, Numbness


Worsened by: Movement, Twisting, Other (deep breathing, but does not feel short 

of breath per se.)


Contributing factors: Lifting, Twisting


Similar symptoms before: Has not had sx before (chronic lower back pain but has 

not had thoracic area pain before.)


Recently seen: Not recently seen





Review of Systems


Constitutional: denies: Fever


Nose: denies: Rhinorrhea / runny nose, Congestion


Throat: denies: Sore throat


Cardiac: denies: Palpitations


Respiratory: denies: Dyspnea, Cough, Wheezing


GI: denies: Abdominal Pain, Nausea, Vomiting





PD PAST MEDICAL HISTORY





- Past Medical History


Past Medical History: Yes


Cardiovascular: Congestive heart failure, Hypertension, High cholesterol, 

Coronary artery disease, MI, Other


Respiratory: Shortness of breath


Neuro: Headache/migraine, Peripheral neuropathy, Other


Endocrine/Autoimmune: Type 1 diabetes, Other


GI: GERD, Chronic diarrhea, Other


: Benign prostate hypertrophy, Renal insuffiency, Other


HEENT: Other


Psych: Depression, Anxiety, Panic attacks, Post traumatic stress disorder


Musculoskeletal: Osteoarthritis


Derm: None





- Past Surgical History


Past Surgical History: Yes


General: Colonoscopy, EGD


Ortho: Carpal Tunnel surgery, Other


/GYN: Other


Cardiovascular: Coronary stent, AICD, Cardiac catheterization


HEENT: Other





- Present Medications


Home Medications: 


 Ambulatory Orders











 Medication  Instructions  Recorded  Confirmed


 


Carvedilol 12.5 mg PO BID 11/11/14 11/25/17


 


Lisinopril 20 mg PO DAILY 11/11/14 11/25/17


 


Pantoprazole [Protonix] 40 mg PO BIDAC 11/11/14 11/25/17


 


Spironolactone 12.5 mg PO DAILY 11/11/14 11/25/17


 


Zolpidem Tartrate 10 mg PO QPM PRN 11/11/14 11/25/17


 


raNITIdine HCl [Ranitidine HCl] 300 mg PO QPM 11/11/14 11/25/17


 


Insulin Lispro [Humalog] 1 - 11 units SQ .SLIDING SCALE 12/14/16 11/25/17


 


Pregabalin [Lyrica] 300 mg PO BID 12/14/16 11/25/17


 


Nortriptyline HCl 20 mg PO 0800,1700 05/08/17 11/25/17


 


Tamsulosin [Flomax] 0.4 mg PO BID 05/08/17 11/25/17


 


Alprazolam 0.5 mg PO BID PRN 06/14/17 11/25/17


 


Cetirizine [ZyrTEC] 10 mg PO DAILY 06/14/17 11/25/17


 


Diclofenac Sodium [Voltaren] 4 gm TP QID PRN 06/14/17 11/25/17


 


Lidocaine Patch 5% [Lidoderm Patch] 1 each TOP DAILY PRN 06/14/17 11/25/17


 


Simvastatin 80 mg PO QPM 06/14/17 11/25/17


 


Budesonide [Entocort EC] 9 mg PO DAILY #60 capsule 06/17/17 11/25/17


 


Loperamide [Imodium] 2 mg PO Q2H PRN #0 capsule 06/17/17 11/25/17


 


Psyllium [Metamucil] 1 packet PO BID  packet 06/17/17 11/25/17


 


oxyCODONE [Roxicodone] 5 mg PO Q6HR #0 06/17/17 11/25/17


 


Acetaminophen [Tylenol] 650 mg PO Q4HR PRN #0 tablet 08/06/17 11/25/17


 


Aspirin Chewable [St Kwadwo 81 mg PO DAILY  tablet 08/06/17 11/25/17





Aspirin]   


 


DULoxetine [Cymbalta] 60 mg PO BID  capsule 08/06/17 11/25/17


 


Gabapentin [Neurontin] 300 mg PO BID  capsule 08/06/17 11/25/17


 


Insulin Aspart [NovoLOG] 1 - 9 unit SUBQ 08/06/17 11/25/17





 0800,1200,1700,2100  pen  


 


buPROPion [Wellbutrin Xl] 150 mg PO DAILY  tablet 08/06/17 11/25/17


 


Morphine Sulfate 15 mg PO TID PRN #15 tablet 09/26/17 11/25/17


 


HYDROmorphone [Dilaudid] 2 mg PO Q6H PRN #10 tablet 11/25/17 


 


Methocarbamol [Robaxin] 500 mg PO TID PRN #20 tablet 11/25/17 














- Allergies


Allergies/Adverse Reactions: 


 Allergies











Allergy/AdvReac Type Severity Reaction Status Date / Time


 


No Known Drug Allergies Allergy   Verified 11/25/17 19:23














- Social History


Does the pt smoke?: No


Smoking Status: Never smoker


Does the pt drink ETOH?: No


Does the pt have substance abuse?: No





- Immunizations


Immunizations are current?: Yes





- POLST


Patient has POLST: No





PD ED PE NORMAL





- Vitals


Vital signs reviewed: Yes





- General


General: Alert and oriented X 3, Well developed/nourished





- HEENT


HEENT: Atraumatic





- Neck


Neck: Supple, no meningeal sign, No adenopathy





- Cardiac


Cardiac: RRR, No murmur





- Respiratory


Respiratory: Clear bilaterally, Other (no coarse sounds)





- Abdomen


Abdomen: Soft, Non tender





- Back


Back: No CVA TTP, No spinal TTP (has muscular tenderness medial scapular area 

on left. No rash nor sores. Tender to palpation. )





- Derm


Derm: Normal color, Warm and dry





- Extremities


Extremities: No deformity, No tenderness to palpate, Normal ROM s pain, No edema

, No calf tenderness / cord





- Neuro


Neuro: Alert and oriented X 3, No motor deficit, Normal speech





Results





- Vitals


Vitals: 


 Vital Signs - 24 hr











  11/25/17 11/25/17





  19:21 21:34


 


Temperature 36.6 C 36.9 C


 


Heart Rate 100 89


 


Respiratory 20 18





Rate  


 


Blood Pressure 136/77 H 120/68


 


O2 Saturation 100 96








 Oxygen











O2 Source [With Activity]      Room air


 


O2 Source                      Room air

















- Rads (name of study)


  ** chest


Radiology: Prelim report reviewed (some basilar atelectasis. No effusion nor 

infiltrate. )





PD MEDICAL DECISION MAKING





- ED course


Complexity details: reviewed old records (he has some prior visits for pain 

issues but also has real disease with CAD/vascular and diabetes. He would not 

do well withs teroids due to DM adn his cardiologist said to avoid regular use 

NSAIDs for CHF. ), reviewed results (CXR okay), considered differential, d/w 

patient





Departure





- Departure


Disposition: 01 Home, Self Care


Clinical Impression: 


Thoracic back pain


Qualifiers:


 Chronicity: acute Back pain laterality: left Qualified Code(s): M54.6 - Pain 

in thoracic spine





Condition: Stable


Record reviewed to determine appropriate education?: Yes


Instructions:  ED Neck Back Pain General


Follow-Up: 


Leo Ray MD [Primary Care Provider] - 


Prescriptions: 


HYDROmorphone [Dilaudid] 2 mg PO Q6H PRN #10 tablet


 PRN Reason: Pain


Methocarbamol [Robaxin] 500 mg PO TID PRN #20 tablet


 PRN Reason: Spasms


Comments: 


Continue usual medications. Add Robaxin muscle relaxant three times daily. Add 

Dilaudid to your usual pain medications for the next 2-3 days. Follow up with 

PMD Monday if not improved. Watch for development of other symptoms Such as 

fever, cough, rash, other symptoms.  Your x-ray does not show significant 

changes to account for the pain in the scapular area.


Discharge Date/Time: 11/25/17 21:49

## 2017-11-25 NOTE — XRAY PRELIMINARY REPORT
Accession: [id number]

Exam: XR CHEST 2 VIEW PA/LAT

 

IMPRESSION: New bibasilar linear densities. Possibly worsening atelectasis or airways inflammatory di
sease.

 

[facility]

 

SITE ID: [id number]

## 2017-11-25 NOTE — XRAY REPORT
EXAM:

CHEST RADIOGRAPHY

 

EXAM DATE: 11/25/2017 09:08 PM.

 

CLINICAL HISTORY: Left scapular back pain.

 

COMPARISON: 09/26/2017.

 

TECHNIQUE: 2 views.

 

FINDINGS: 

Lungs/Pleura: There are new bibasilar linear and streaky densities. Negative for pleural effusion and
 pneumothorax.

 

Mediastinum: Pacemaker defibrillator lead overlies the right ventricle. Heart size appears within nor
mal limits.

 

Other: None.

 

IMPRESSION: New bibasilar linear densities. Possibly worsening atelectasis or airways inflammatory di
sease.

 

[facility]

Referring Provider Line: [phone]

 

SITE ID: [id number]

## 2017-12-07 ENCOUNTER — HOSPITAL ENCOUNTER (EMERGENCY)
Dept: HOSPITAL 76 - ED | Age: 57
Discharge: HOME | End: 2017-12-07
Payer: MEDICARE

## 2017-12-07 VITALS — SYSTOLIC BLOOD PRESSURE: 115 MMHG | DIASTOLIC BLOOD PRESSURE: 79 MMHG

## 2017-12-07 DIAGNOSIS — M19.90: ICD-10-CM

## 2017-12-07 DIAGNOSIS — I50.9: ICD-10-CM

## 2017-12-07 DIAGNOSIS — K21.9: ICD-10-CM

## 2017-12-07 DIAGNOSIS — E78.00: ICD-10-CM

## 2017-12-07 DIAGNOSIS — N28.9: ICD-10-CM

## 2017-12-07 DIAGNOSIS — Z87.891: ICD-10-CM

## 2017-12-07 DIAGNOSIS — N40.0: ICD-10-CM

## 2017-12-07 DIAGNOSIS — J45.901: ICD-10-CM

## 2017-12-07 DIAGNOSIS — Z79.4: ICD-10-CM

## 2017-12-07 DIAGNOSIS — Z95.810: ICD-10-CM

## 2017-12-07 DIAGNOSIS — I25.10: ICD-10-CM

## 2017-12-07 DIAGNOSIS — I11.0: Primary | ICD-10-CM

## 2017-12-07 DIAGNOSIS — I25.2: ICD-10-CM

## 2017-12-07 DIAGNOSIS — E10.42: ICD-10-CM

## 2017-12-07 LAB
ALBUMIN/GLOB SERPL: 1.2 {RATIO} (ref 1–2.2)
ANION GAP SERPL CALCULATED.4IONS-SCNC: 7 MMOL/L (ref 6–13)
BASOPHILS NFR BLD AUTO: 0.1 10^3/UL (ref 0–0.1)
BASOPHILS NFR BLD AUTO: 1 %
BILIRUB BLD-MCNC: 0.4 MG/DL (ref 0.2–1)
BUN SERPL-MCNC: 18 MG/DL (ref 6–20)
CALCIUM UR-MCNC: 8.2 MG/DL (ref 8.5–10.3)
CHLORIDE SERPL-SCNC: 100 MMOL/L (ref 101–111)
CO2 SERPL-SCNC: 28 MMOL/L (ref 21–32)
CREAT SERPLBLD-SCNC: 1.1 MG/DL (ref 0.6–1.2)
EOSINOPHIL # BLD AUTO: 0.2 10^3/UL (ref 0–0.7)
EOSINOPHIL NFR BLD AUTO: 1.5 %
ERYTHROCYTE [DISTWIDTH] IN BLOOD BY AUTOMATED COUNT: 14.2 % (ref 12–15)
GFRSERPLBLD MDRD-ARVRAT: 69 ML/MIN/{1.73_M2} (ref 89–?)
GLOBULIN SER-MCNC: 3.1 G/DL (ref 2.1–4.2)
GLUCOSE SERPL-MCNC: 201 MG/DL (ref 70–100)
HCT VFR BLD AUTO: 36.2 % (ref 42–52)
HGB UR QL STRIP: 11.6 G/DL (ref 14–18)
LIPASE SERPL-CCNC: < 10 U/L (ref 22–51)
LYMPHOCYTES # SPEC AUTO: 1.6 10^3/UL (ref 1.5–3.5)
LYMPHOCYTES NFR BLD AUTO: 13.6 %
MCH RBC QN AUTO: 29.3 PG (ref 27–31)
MCHC RBC AUTO-ENTMCNC: 32 G/DL (ref 32–36)
MCV RBC AUTO: 91.6 FL (ref 80–94)
MONOCYTES # BLD AUTO: 1 10^3/UL (ref 0–1)
MONOCYTES NFR BLD AUTO: 8.3 %
NEUTROPHILS # BLD AUTO: 9.2 10^3/UL (ref 1.5–6.6)
NEUTROPHILS # SNV AUTO: 12.1 X10^3/UL (ref 4.8–10.8)
NEUTROPHILS NFR BLD AUTO: 75.6 %
NRBC # BLD AUTO: 0 /100WBC
PDW BLD AUTO: 9.1 FL (ref 7.4–11.4)
POTASSIUM SERPL-SCNC: 3.6 MMOL/L (ref 3.5–5)
PROT SPEC-MCNC: 6.8 G/DL (ref 6.7–8.2)
RBC MAR: 3.95 10^6/UL (ref 4.7–6.1)
SODIUM SERPLBLD-SCNC: 135 MMOL/L (ref 135–145)
WBC # BLD: 12.1 X10^3/UL

## 2017-12-07 PROCEDURE — 93005 ELECTROCARDIOGRAM TRACING: CPT

## 2017-12-07 PROCEDURE — 83880 ASSAY OF NATRIURETIC PEPTIDE: CPT

## 2017-12-07 PROCEDURE — 96374 THER/PROPH/DIAG INJ IV PUSH: CPT

## 2017-12-07 PROCEDURE — 36415 COLL VENOUS BLD VENIPUNCTURE: CPT

## 2017-12-07 PROCEDURE — 94640 AIRWAY INHALATION TREATMENT: CPT

## 2017-12-07 PROCEDURE — 84484 ASSAY OF TROPONIN QUANT: CPT

## 2017-12-07 PROCEDURE — 99283 EMERGENCY DEPT VISIT LOW MDM: CPT

## 2017-12-07 PROCEDURE — 85025 COMPLETE CBC W/AUTO DIFF WBC: CPT

## 2017-12-07 PROCEDURE — 99284 EMERGENCY DEPT VISIT MOD MDM: CPT

## 2017-12-07 PROCEDURE — 71010: CPT

## 2017-12-07 PROCEDURE — 80053 COMPREHEN METABOLIC PANEL: CPT

## 2017-12-07 PROCEDURE — 83690 ASSAY OF LIPASE: CPT

## 2017-12-07 NOTE — ED PHYSICIAN DOCUMENTATION
PD HPI DYSPNEA





- Stated complaint


Stated Complaint: SOA





- Chief complaint


Chief Complaint: Resp





- History obtained from


History obtained from: Patient





- History of Present Illness


Timing - onset: How many hours ago (2)


Timing - onset during: Other (Coughing spasm.)


Timing - details: Still present


Associated symptoms: Cough (Nonproductive), Wheezing.  No: Fever


Similar symptoms before: Diagnosis (Reports history of similar episode about 6 

months ago, with exacerbation of asthma.)





- Additional information


Additional information: 





The patient is a 57-year-old male with history of coronary artery disease, 

status post MI in 2012, history of congestive heart failure and diabetes, who 

presents with shortness of breath that started about 2 hours prior to arrival 

following a coughing episode.  His cough is nonproductive of sputum.  He denies 

fever.  He does report mild left substernal chest discomfort.  He denies nausea

, vomiting, or diaphoresis.  He reports history of similar symptoms about 6 

months ago and had good results with respiratory treatments.  He stopped 

smoking cigarettes in 2012 except for a 6 week period this past summer.





Review of Systems


Constitutional: denies: Fever


Ears: denies: Tinnitus/ringing


Nose: denies: Congestion


Throat: denies: Sore throat


Cardiac: reports: Chest pain / pressure (very mild).  denies: Palpitations


Respiratory: reports: Dyspnea, Cough (nonproductive), Wheezing


GI: denies: Abdominal Pain, Nausea, Vomiting


: denies: Dysuria


Skin: denies: Rash


Musculoskeletal: denies: Extremity swelling


Neurologic: denies: Focal weakness, Numbness, Headache





PD PAST MEDICAL HISTORY





- Past Medical History


Cardiovascular: Congestive heart failure, Hypertension, High cholesterol, 

Coronary artery disease, MI, Other


Respiratory: Shortness of breath


Neuro: Headache/migraine, Peripheral neuropathy, Other


Endocrine/Autoimmune: Type 1 diabetes, Other


GI: GERD, Chronic diarrhea, Other


: Benign prostate hypertrophy, Renal insuffiency, Other


HEENT: Other


Psych: Depression, Anxiety, Panic attacks, Post traumatic stress disorder


Musculoskeletal: Osteoarthritis


Derm: None





- Past Surgical History


Past Surgical History: Yes


General: Colonoscopy, EGD


Ortho: Carpal Tunnel surgery, Other


/GYN: Other


Cardiovascular: Coronary stent, AICD, Cardiac catheterization


HEENT: Other





- Present Medications


Home Medications: 


 Ambulatory Orders











 Medication  Instructions  Recorded  Confirmed


 


Carvedilol 12.5 mg PO BID 11/11/14 11/25/17


 


Lisinopril 20 mg PO DAILY 11/11/14 11/25/17


 


Pantoprazole [Protonix] 40 mg PO BIDAC 11/11/14 11/25/17


 


Spironolactone 12.5 mg PO DAILY 11/11/14 11/25/17


 


Zolpidem Tartrate 10 mg PO QPM PRN 11/11/14 11/25/17


 


raNITIdine HCl [Ranitidine HCl] 300 mg PO QPM 11/11/14 11/25/17


 


Insulin Lispro [Humalog] 1 - 11 units SQ .SLIDING SCALE 12/14/16 11/25/17


 


Pregabalin [Lyrica] 300 mg PO BID 12/14/16 11/25/17


 


Nortriptyline HCl 20 mg PO 0800,1700 05/08/17 11/25/17


 


Tamsulosin [Flomax] 0.4 mg PO BID 05/08/17 11/25/17


 


Alprazolam 0.5 mg PO BID PRN 06/14/17 11/25/17


 


Cetirizine [ZyrTEC] 10 mg PO DAILY 06/14/17 11/25/17


 


Diclofenac Sodium [Voltaren] 4 gm TP QID PRN 06/14/17 11/25/17


 


Lidocaine Patch 5% [Lidoderm Patch] 1 each TOP DAILY PRN 06/14/17 11/25/17


 


Simvastatin 80 mg PO QPM 06/14/17 11/25/17


 


Budesonide [Entocort EC] 9 mg PO DAILY #60 capsule 06/17/17 11/25/17


 


Loperamide [Imodium] 2 mg PO Q2H PRN #0 capsule 06/17/17 11/25/17


 


Psyllium [Metamucil] 1 packet PO BID  packet 06/17/17 11/25/17


 


oxyCODONE [Roxicodone] 5 mg PO Q6HR #0 06/17/17 11/25/17


 


Acetaminophen [Tylenol] 650 mg PO Q4HR PRN #0 tablet 08/06/17 11/25/17


 


Aspirin Chewable [St Kwadwo 81 mg PO DAILY  tablet 08/06/17 11/25/17





Aspirin]   


 


DULoxetine [Cymbalta] 60 mg PO BID  capsule 08/06/17 11/25/17


 


Gabapentin [Neurontin] 300 mg PO BID  capsule 08/06/17 11/25/17


 


Insulin Aspart [NovoLOG] 1 - 9 unit SUBQ 08/06/17 11/25/17





 0800,1200,1700,2100  pen  


 


buPROPion [Wellbutrin Xl] 150 mg PO DAILY  tablet 08/06/17 11/25/17


 


Morphine Sulfate 15 mg PO TID PRN #15 tablet 09/26/17 11/25/17


 


HYDROmorphone [Dilaudid] 2 mg PO Q6H PRN #10 tablet 11/25/17 


 


Methocarbamol [Robaxin] 500 mg PO TID PRN #20 tablet 11/25/17 


 


Levalbuterol Tartrate [Xopenex Hfa] 15 gm IH BID PRN #1 hfa.aer.ad 12/07/17 














- Allergies


Allergies/Adverse Reactions: 


 Allergies











Allergy/AdvReac Type Severity Reaction Status Date / Time


 


No Known Drug Allergies Allergy   Verified 11/25/17 19:23














- Social History


Does the pt smoke?: No


Smoking Status: Former smoker (Quit in 2012.)


Does the pt drink ETOH?: No


Does the pt have substance abuse?: No





- Immunizations


Immunizations are current?: Yes





- POLST


Patient has POLST: No





PD ED PE NORMAL





- Vitals


Vital signs reviewed: Yes (normal)





- General


General: Alert and oriented X 3, Well developed/nourished, Other (overweight)





- HEENT


HEENT: Atraumatic, Moist mucous membranes, Pharynx benign





- Neck


Neck: No adenopathy, No JVD





- Cardiac


Cardiac: RRR, No murmur





- Respiratory


Respiratory: Other (Tight wheezed diffusely, with prolonged expiratory phase. 

No rales or rhonchi.)





- Abdomen


Abdomen: Soft, Non tender





- Back


Back: No CVA TTP





- Derm


Derm: No rash





- Extremities


Extremities: No edema, No calf tenderness / cord





- Neuro


Neuro: Alert and oriented X 3, No motor deficit, No sensory deficit





Results





- Vitals


Vitals: 


 Oxygen











O2 Source []                   Room air


 


O2 Source                      Room air

















- EKG (time done)


  ** 16:39


Rate: Rate (enter#) (73)


Rhythm: NSR


Axis: Normal


Intervals: Normal OR


QRS: Normal


Ischemia: Q waves (in precordial leads V1-V5, consistent with anteroseptal 

infarct, age indeterminate.)


Compare to prior EKG: Unchanged from prior EKG


Computer interpretation: Agree with computer





- Labs


Labs: 


 Laboratory Tests











  12/07/17 12/07/17 12/07/17





  17:00 17:00 17:00


 


WBC  12.1 H  


 


RBC  3.95 L  


 


Hgb  11.6 L  


 


Hct  36.2 L  


 


MCV  91.6  


 


MCH  29.3  


 


MCHC  32.0  


 


RDW  14.2  


 


Plt Count  262  


 


MPV  9.1  


 


Neut #  9.2 H  


 


Lymph #  1.6  


 


Mono #  1.0  


 


Eos #  0.2  


 


Baso #  0.1  


 


Absolute Nucleated RBC  0.00  


 


Nucleated RBC %  0.0  


 


Sodium   135 


 


Potassium   3.6 


 


Chloride   100 L 


 


Carbon Dioxide   28 


 


Anion Gap   7.0 


 


BUN   18 


 


Creatinine   1.1 


 


Estimated GFR (MDRD)   69 L 


 


Glucose   201 H 


 


Calcium   8.2 L 


 


Total Bilirubin   0.4 


 


AST   18 


 


ALT   21 


 


Alkaline Phosphatase   81 


 


Troponin I    < 0.04


 


B-Natriuretic Peptide   


 


Total Protein   6.8 


 


Albumin   3.7 


 


Globulin   3.1 


 


Albumin/Globulin Ratio   1.2 


 


Lipase   < 10 L 














  12/07/17





  17:00


 


WBC 


 


RBC 


 


Hgb 


 


Hct 


 


MCV 


 


MCH 


 


MCHC 


 


RDW 


 


Plt Count 


 


MPV 


 


Neut # 


 


Lymph # 


 


Mono # 


 


Eos # 


 


Baso # 


 


Absolute Nucleated RBC 


 


Nucleated RBC % 


 


Sodium 


 


Potassium 


 


Chloride 


 


Carbon Dioxide 


 


Anion Gap 


 


BUN 


 


Creatinine 


 


Estimated GFR (MDRD) 


 


Glucose 


 


Calcium 


 


Total Bilirubin 


 


AST 


 


ALT 


 


Alkaline Phosphatase 


 


Troponin I 


 


B-Natriuretic Peptide  29


 


Total Protein 


 


Albumin 


 


Globulin 


 


Albumin/Globulin Ratio 


 


Lipase 














- Rads (name of study)


  ** 1-view CXR


Radiology: Prelim report reviewed, EMP read contemporaneously, See rad report (

Mild diffuse bilateral airspace disease concerning for pulmonary edema with 

pulmonary venous congestion, increased compared to prior.  No pleural effusion 

or pneumothorax.  Stable cardiomegaly and pacemaker.)





PD MEDICAL DECISION MAKING





- ED course


Complexity details: reviewed old records, reviewed results, re-evaluated patient

, considered differential, d/w patient


ED course: 


The patient's presentation is most consistent with acute exacerbation of asthma/

COPD, although a component of congestive heart failure is also apparent on x-

ray examination of the chest.  His BNP is normal at 29.  There is no clinical 

or x-ray evidence to suggest pneumonia or pulmonary embolus.


Treatment in the emergency department included administration of Dexamethasone 

10 mg po, DuoNeb nebulizer which provided slight improvement in his symptoms.  

Lasix 20 mg IV was administered, followed by Xopenex nebulizer treatment.  His 

symptoms markedly improved after the Xopenex treatment.


He is being discharged with prescription for Xopenex inhaler.  I discussed with 

him outpatient treatment and follow-up, as well as potentially worrisome signs 

or symptoms that should prompt reevaluation in the emergency.








Departure





- Departure


Disposition: 01 Home, Self Care


Clinical Impression: 


 History of CHF (congestive heart failure)





Asthma


Qualifiers:


 Asthma severity: moderate Asthma persistence: unspecified Asthma complication 

type: with acute exacerbation Qualified Code(s): J45.901 - Unspecified asthma 

with (acute) exacerbation





Condition: Stable


Instructions:  ED Reactive Airway Disease


Follow-Up: 


Leo Ray MD [Primary Care Provider] - 


Prescriptions: 


Levalbuterol Tartrate [Xopenex Hfa] 15 gm IH BID PRN #1 hfa.aer.ad


 PRN Reason: Wheezing


Comments: 


Use Xopenex inhaler as prescribed if needed for wheezing.


Continue your other medications as previously prescribed.


Follow up with your primary physician within 1-2 weeks.  Call to schedule an 

appointment.


Return to the emergency department if you develop increasing difficulty 

breathing, or otherwise worsening symptoms.


Discharge Date/Time: 12/07/17 19:09

## 2017-12-07 NOTE — XRAY PRELIMINARY REPORT
Accession: [id number]

Exam: XR CHEST 1 VIEW

 

IMPRESSION: Mild diffuse bilateral airspace disease concerning for pulmonary edema with pulmonary alessandra
ous congestion, increased compared to prior. No pleural effusion or pneumothorax.

Stable cardiomegaly and pacemaker.

 

[facility]

 

SITE ID: [id number]

## 2017-12-07 NOTE — XRAY REPORT
EXAM: 

CHEST RADIOGRAPHY

 

EXAM DATE: 12/7/2017 05:10 PM.

 

CLINICAL HISTORY: Dyspnea.

 

COMPARISON: Chest 11/25/2017.

 

TECHNIQUE: 1 view.

 

FINDINGS:

Lungs/Pleura: Mild diffuse bilateral airspace disease concerning for pulmonary edema with pulmonary v
enous congestion, increased compared to prior. No pleural effusion or pneumothorax.

 

Mediastinum: Stable cardiomegaly and pacemaker.

 

IMPRESSION: Mild diffuse bilateral airspace disease concerning for pulmonary edema with pulmonary alessandra
ous congestion, increased compared to prior. No pleural effusion or pneumothorax.

Stable cardiomegaly and pacemaker.

 

[facility]

Referring Provider Line: [phone]

 

SITE ID: [id number]

## 2018-01-09 ENCOUNTER — HOSPITAL ENCOUNTER (OUTPATIENT)
Dept: HOSPITAL 76 - LAB.WCP | Age: 58
Discharge: HOME | End: 2018-01-09
Attending: FAMILY MEDICINE
Payer: MEDICARE

## 2018-01-09 DIAGNOSIS — E11.22: Primary | ICD-10-CM

## 2018-01-09 DIAGNOSIS — N50.812: ICD-10-CM

## 2018-01-09 DIAGNOSIS — M54.5: ICD-10-CM

## 2018-01-09 DIAGNOSIS — N18.3: ICD-10-CM

## 2018-01-09 LAB
ALBUMIN DIAFP-MCNC: 4 G/DL (ref 3.2–5.5)
ALBUMIN/GLOB SERPL: 1.3 {RATIO} (ref 1–2.2)
ALP SERPL-CCNC: 80 IU/L (ref 42–121)
ALT SERPL W P-5'-P-CCNC: 19 IU/L (ref 10–60)
ANION GAP SERPL CALCULATED.4IONS-SCNC: 10 MMOL/L (ref 6–13)
AST SERPL W P-5'-P-CCNC: 17 IU/L (ref 10–42)
BILIRUB BLD-MCNC: 0.3 MG/DL (ref 0.2–1)
BUN SERPL-MCNC: 17 MG/DL (ref 6–20)
CALCIUM UR-MCNC: 8.8 MG/DL (ref 8.5–10.3)
CHLORIDE SERPL-SCNC: 98 MMOL/L (ref 101–111)
CO2 SERPL-SCNC: 28 MMOL/L (ref 21–32)
CREAT SERPLBLD-SCNC: 1.1 MG/DL (ref 0.6–1.2)
EST. AVERAGE GLUCOSE BLD GHB EST-MCNC: 214 MG/DL (ref 70–100)
GFRSERPLBLD MDRD-ARVRAT: 69 ML/MIN/{1.73_M2} (ref 89–?)
GLOBULIN SER-MCNC: 3 G/DL (ref 2.1–4.2)
GLUCOSE SERPL-MCNC: 182 MG/DL (ref 70–100)
HB2 TOTAL: 13.4 G/DL
HBA1C BLD-MCNC: 1.02 G/DL
HEMOGLOBIN A1C %: 9.1 % (ref 4.6–6.2)
PROT SPEC-MCNC: 7 G/DL (ref 6.7–8.2)
SODIUM SERPLBLD-SCNC: 136 MMOL/L (ref 135–145)

## 2018-01-09 PROCEDURE — 36415 COLL VENOUS BLD VENIPUNCTURE: CPT

## 2018-01-09 PROCEDURE — 83036 HEMOGLOBIN GLYCOSYLATED A1C: CPT

## 2018-01-09 PROCEDURE — 80053 COMPREHEN METABOLIC PANEL: CPT

## 2018-03-28 ENCOUNTER — HOSPITAL ENCOUNTER (EMERGENCY)
Dept: HOSPITAL 76 - ED | Age: 58
Discharge: HOME | End: 2018-03-28
Payer: MEDICARE

## 2018-03-28 ENCOUNTER — HOSPITAL ENCOUNTER (OUTPATIENT)
Dept: HOSPITAL 76 - LAB | Age: 58
Discharge: HOME | End: 2018-03-28
Attending: PHYSICIAN ASSISTANT
Payer: MEDICARE

## 2018-03-28 VITALS — DIASTOLIC BLOOD PRESSURE: 72 MMHG | SYSTOLIC BLOOD PRESSURE: 124 MMHG

## 2018-03-28 DIAGNOSIS — R07.9: Primary | ICD-10-CM

## 2018-03-28 DIAGNOSIS — M19.90: ICD-10-CM

## 2018-03-28 DIAGNOSIS — E10.42: ICD-10-CM

## 2018-03-28 DIAGNOSIS — Z79.4: ICD-10-CM

## 2018-03-28 DIAGNOSIS — K21.9: ICD-10-CM

## 2018-03-28 DIAGNOSIS — I11.0: ICD-10-CM

## 2018-03-28 DIAGNOSIS — I50.9: ICD-10-CM

## 2018-03-28 DIAGNOSIS — I25.2: ICD-10-CM

## 2018-03-28 DIAGNOSIS — Z95.810: ICD-10-CM

## 2018-03-28 DIAGNOSIS — N40.0: ICD-10-CM

## 2018-03-28 DIAGNOSIS — R07.81: Primary | ICD-10-CM

## 2018-03-28 DIAGNOSIS — I25.10: ICD-10-CM

## 2018-03-28 LAB
ALBUMIN DIAFP-MCNC: 4.1 G/DL (ref 3.2–5.5)
ALBUMIN DIAFP-MCNC: 4.1 G/DL (ref 3.2–5.5)
ALBUMIN/GLOB SERPL: 1.3 {RATIO} (ref 1–2.2)
ALBUMIN/GLOB SERPL: 1.3 {RATIO} (ref 1–2.2)
ALP SERPL-CCNC: 92 IU/L (ref 42–121)
ALP SERPL-CCNC: 98 IU/L (ref 42–121)
ALT SERPL W P-5'-P-CCNC: 21 IU/L (ref 10–60)
ALT SERPL W P-5'-P-CCNC: 22 IU/L (ref 10–60)
ANION GAP SERPL CALCULATED.4IONS-SCNC: 7 MMOL/L (ref 6–13)
ANION GAP SERPL CALCULATED.4IONS-SCNC: 8 MMOL/L (ref 6–13)
AST SERPL W P-5'-P-CCNC: 16 IU/L (ref 10–42)
AST SERPL W P-5'-P-CCNC: 16 IU/L (ref 10–42)
BASOPHILS NFR BLD AUTO: 0.1 10^3/UL (ref 0–0.1)
BASOPHILS NFR BLD AUTO: 0.2 10^3/UL (ref 0–0.1)
BASOPHILS NFR BLD AUTO: 0.9 %
BASOPHILS NFR BLD AUTO: 1.4 %
BILIRUB BLD-MCNC: 0.6 MG/DL (ref 0.2–1)
BILIRUB BLD-MCNC: 0.6 MG/DL (ref 0.2–1)
BUN SERPL-MCNC: 14 MG/DL (ref 6–20)
BUN SERPL-MCNC: 14 MG/DL (ref 6–20)
CALCIUM UR-MCNC: 9.1 MG/DL (ref 8.5–10.3)
CALCIUM UR-MCNC: 9.1 MG/DL (ref 8.5–10.3)
CHLORIDE SERPL-SCNC: 97 MMOL/L (ref 101–111)
CHLORIDE SERPL-SCNC: 97 MMOL/L (ref 101–111)
CO2 SERPL-SCNC: 30 MMOL/L (ref 21–32)
CO2 SERPL-SCNC: 31 MMOL/L (ref 21–32)
CREAT SERPLBLD-SCNC: 1.1 MG/DL (ref 0.6–1.2)
CREAT SERPLBLD-SCNC: 1.1 MG/DL (ref 0.6–1.2)
EOSINOPHIL # BLD AUTO: 0.1 10^3/UL (ref 0–0.7)
EOSINOPHIL # BLD AUTO: 0.1 10^3/UL (ref 0–0.7)
EOSINOPHIL NFR BLD AUTO: 0.9 %
EOSINOPHIL NFR BLD AUTO: 0.9 %
ERYTHROCYTE [DISTWIDTH] IN BLOOD BY AUTOMATED COUNT: 16.2 % (ref 12–15)
ERYTHROCYTE [DISTWIDTH] IN BLOOD BY AUTOMATED COUNT: 16.5 % (ref 12–15)
GFRSERPLBLD MDRD-ARVRAT: 69 ML/MIN/{1.73_M2} (ref 89–?)
GFRSERPLBLD MDRD-ARVRAT: 69 ML/MIN/{1.73_M2} (ref 89–?)
GLOBULIN SER-MCNC: 3.1 G/DL (ref 2.1–4.2)
GLOBULIN SER-MCNC: 3.2 G/DL (ref 2.1–4.2)
GLUCOSE SERPL-MCNC: 111 MG/DL (ref 70–100)
GLUCOSE SERPL-MCNC: 210 MG/DL (ref 70–100)
HGB UR QL STRIP: 13.4 G/DL (ref 14–18)
HGB UR QL STRIP: 13.6 G/DL (ref 14–18)
LIPASE SERPL-CCNC: < 10 U/L (ref 22–51)
LYMPHOCYTES # SPEC AUTO: 1.6 10^3/UL (ref 1.5–3.5)
LYMPHOCYTES # SPEC AUTO: 1.8 10^3/UL (ref 1.5–3.5)
LYMPHOCYTES NFR BLD AUTO: 12 %
LYMPHOCYTES NFR BLD AUTO: 13.5 %
MCH RBC QN AUTO: 27.2 PG (ref 27–31)
MCH RBC QN AUTO: 27.2 PG (ref 27–31)
MCHC RBC AUTO-ENTMCNC: 32 G/DL (ref 32–36)
MCHC RBC AUTO-ENTMCNC: 32.2 G/DL (ref 32–36)
MCV RBC AUTO: 84.2 FL (ref 80–94)
MCV RBC AUTO: 85 FL (ref 80–94)
MONOCYTES # BLD AUTO: 0.9 10^3/UL (ref 0–1)
MONOCYTES # BLD AUTO: 1.2 10^3/UL (ref 0–1)
MONOCYTES NFR BLD AUTO: 6.8 %
MONOCYTES NFR BLD AUTO: 9 %
NEUTROPHILS # BLD AUTO: 10.3 10^3/UL (ref 1.5–6.6)
NEUTROPHILS # BLD AUTO: 9.9 10^3/UL (ref 1.5–6.6)
NEUTROPHILS # SNV AUTO: 13.1 X10^3/UL (ref 4.8–10.8)
NEUTROPHILS # SNV AUTO: 13.1 X10^3/UL (ref 4.8–10.8)
NEUTROPHILS NFR BLD AUTO: 75.7 %
NEUTROPHILS NFR BLD AUTO: 78.9 %
PDW BLD AUTO: 8.7 FL (ref 7.4–11.4)
PDW BLD AUTO: 8.8 FL (ref 7.4–11.4)
PLATELET # BLD: 227 10^3/UL (ref 130–450)
PLATELET # BLD: 229 10^3/UL (ref 130–450)
PROT SPEC-MCNC: 7.2 G/DL (ref 6.7–8.2)
PROT SPEC-MCNC: 7.3 G/DL (ref 6.7–8.2)
RBC MAR: 4.91 10^6/UL (ref 4.7–6.1)
RBC MAR: 4.99 10^6/UL (ref 4.7–6.1)
SODIUM SERPLBLD-SCNC: 135 MMOL/L (ref 135–145)
SODIUM SERPLBLD-SCNC: 135 MMOL/L (ref 135–145)

## 2018-03-28 PROCEDURE — 36415 COLL VENOUS BLD VENIPUNCTURE: CPT

## 2018-03-28 PROCEDURE — 85025 COMPLETE CBC W/AUTO DIFF WBC: CPT

## 2018-03-28 PROCEDURE — 80053 COMPREHEN METABOLIC PANEL: CPT

## 2018-03-28 PROCEDURE — 93005 ELECTROCARDIOGRAM TRACING: CPT

## 2018-03-28 PROCEDURE — 71275 CT ANGIOGRAPHY CHEST: CPT

## 2018-03-28 PROCEDURE — 99284 EMERGENCY DEPT VISIT MOD MDM: CPT

## 2018-03-28 PROCEDURE — 85379 FIBRIN DEGRADATION QUANT: CPT

## 2018-03-28 PROCEDURE — 83690 ASSAY OF LIPASE: CPT

## 2018-03-28 PROCEDURE — 84484 ASSAY OF TROPONIN QUANT: CPT

## 2018-03-28 NOTE — ED PHYSICIAN DOCUMENTATION
PD HPI CHEST PAIN





- Stated complaint


Stated Complaint: CHEST PX





- Chief complaint


Chief Complaint: Cardiac





- History obtained from


History obtained from: Patient





- History of Present Illness


Timing - onset: Chronic


Timing - onset during: Rest


Timing - details: Gradual onset, Intermittant


Quality: Pressure, Aching


Associated symptoms: No: Shortness of air, Diaphoresis


Similar symptoms before: Work up / diagnostics, Treatment


Recently seen: Clinic





- Additional information


Additional information: 





Patient is a 57 year old male with a history of prior MI who was sent in for CT 

angio.  Patient had gone to the clinic today and part of the work up was a d-

dimer which was elevated so patient was sent in for imaging.  Upon initial 

evaluation in the emergency department patient was chest pain free and stated 

that he had negative stress test and echo within the last year.  





Review of Systems


Constitutional: denies: Fever, Chills


Eyes: reports: Reviewed and negative


Ears: reports: Reviewed and negative


Nose: reports: Reviewed and negative


Throat: reports: Reviewed and negative


Cardiac: reports: Chest pain / pressure, Pedal edema


Respiratory: denies: Dyspnea, Cough, Wheezing


GI: denies: Nausea, Vomiting


: reports: Reviewed and negative


Skin: reports: Reviewed and negative


Musculoskeletal: reports: Extremity swelling.  denies: Back pain, Extremity pain


Neurologic: denies: Generalized weakness, Focal weakness, Headache


Psychiatric: reports: Reviewed and negative





PD PAST MEDICAL HISTORY





- Past Medical History


Cardiovascular: Congestive heart failure, Hypertension, High cholesterol, 

Coronary artery disease, MI, Other


Respiratory: Shortness of breath


Neuro: Headache/migraine, Peripheral neuropathy, Other


Endocrine/Autoimmune: Type 1 diabetes, Other


GI: GERD, Chronic diarrhea, Other


: Benign prostate hypertrophy, Renal insuffiency, Other


HEENT: Other


Psych: Depression, Anxiety, Panic attacks, Post traumatic stress disorder


Musculoskeletal: Osteoarthritis


Derm: None





- Past Surgical History


Past Surgical History: Yes


General: Colonoscopy, EGD


Ortho: Carpal Tunnel surgery, Other


/GYN: Other


Cardiovascular: Coronary stent, AICD, Cardiac catheterization


HEENT: Other





- Present Medications


Home Medications: 


 Ambulatory Orders











 Medication  Instructions  Recorded  Confirmed


 


Pantoprazole [Protonix] 40 mg PO BIDAC 11/11/14 03/28/18


 


Spironolactone 12.5 mg PO DAILY 11/11/14 03/28/18


 


Zolpidem Tartrate 10 mg PO QPM PRN 11/11/14 03/28/18


 


raNITIdine HCl [Ranitidine HCl] 300 mg PO QPM 11/11/14 03/28/18


 


Insulin Lispro [Humalog] 1 - 11 units SQ .SLIDING SCALE 12/14/16 03/28/18


 


Pregabalin [Lyrica] 300 mg PO BID 12/14/16 03/28/18


 


Nortriptyline HCl 20 mg PO 0800,1700 05/08/17 03/28/18


 


Alprazolam 0.5 mg PO BID PRN 06/14/17 03/28/18


 


Cetirizine [ZyrTEC] 10 mg PO DAILY 06/14/17 03/28/18


 


Diclofenac Sodium [Voltaren] 4 gm TP QID PRN 06/14/17 03/28/18


 


Lidocaine Patch 5% [Lidoderm Patch] 1 each TOP DAILY PRN 06/14/17 03/28/18


 


Simvastatin 80 mg PO QPM 06/14/17 03/28/18


 


Budesonide [Entocort EC] 9 mg PO DAILY #60 capsule 06/17/17 03/28/18


 


Psyllium [Metamucil] 1 packet PO BID  packet 06/17/17 03/28/18


 


oxyCODONE [Roxicodone] 5 mg PO Q6HR #0 06/17/17 03/28/18


 


Aspirin Chewable [St Kwadwo 81 mg PO DAILY  tablet 08/06/17 03/28/18





Aspirin]   


 


DULoxetine [Cymbalta] 60 mg PO BID  capsule 08/06/17 03/28/18


 


Gabapentin [Neurontin] 300 mg PO BID  capsule 08/06/17 03/28/18


 


buPROPion [Wellbutrin Xl] 150 mg PO DAILY  tablet 08/06/17 11/25/17


 


Cholestyramine [Questran] 1 pkt PO TID 03/28/18 03/28/18


 


Furosemide 20 mg PO BID 03/28/18 03/28/18


 


Metoprolol Succinate/Hctz 1 each PO DAILY 03/28/18 03/28/18





[Metoprolol ER-Hctz 25-12.5 mg]   


 


Nitroglycerin [Nitrostat] 1 tab SL PRN PRN 03/28/18 03/28/18


 


Pramlintide Acetate [Symlinpen 60] 15 mcg SQ AC 03/28/18 03/28/18














- Allergies


Allergies/Adverse Reactions: 


 Allergies











Allergy/AdvReac Type Severity Reaction Status Date / Time


 


No Known Drug Allergies Allergy   Verified 03/28/18 18:47














- Social History


Does the pt smoke?: No


Smoking Status: Never smoker


Does the pt drink ETOH?: No


Does the pt have substance abuse?: No





- Immunizations


Immunizations are current?: Yes





- POLST


Patient has POLST: No





PD ED PE NORMAL





- Vitals


Vital signs reviewed: Yes





- General


General: Alert and oriented X 3, No acute distress





- HEENT


HEENT: Atraumatic, Moist mucous membranes





- Neck


Neck: Supple, no meningeal sign





- Cardiac


Cardiac: RRR, No murmur





- Respiratory


Respiratory: No respiratory distress





- Abdomen


Abdomen: Soft, Non distended





- Derm


Derm: Normal color





- Extremities


Extremities: No deformity





- Neuro


Neuro: Alert and oriented X 3, No motor deficit, Normal speech


Eye Opening: Spontaneous





Results





- Vitals


Vitals: 


 Vital Signs - 24 hr











  03/28/18 03/28/18 03/28/18





  18:17 20:25 21:48


 


Temperature 36.6 C  36.5 C


 


Heart Rate 87 79 78


 


Respiratory 18 12 12





Rate   


 


Blood Pressure 135/90 H 141/94 H 129/78


 


O2 Saturation 96 95 94














  03/28/18





  22:47


 


Temperature 


 


Heart Rate 76


 


Respiratory 18





Rate 


 


Blood Pressure 124/72


 


O2 Saturation 94








 Oxygen











O2 Source [With Activity]      Room air


 


O2 Source                      Room air

















- Labs


Labs: 


 Laboratory Tests











  03/28/18 03/28/18 03/28/18





  18:30 18:30 18:30


 


WBC  13.1 H  


 


RBC  4.91  


 


Hgb  13.4 L  


 


Hct  41.4 L  


 


MCV  84.2  


 


MCH  27.2  


 


MCHC  32.2  


 


RDW  16.2 H  


 


Plt Count  227  


 


MPV  8.8  


 


Neut #  9.9 H  


 


Lymph #  1.8  


 


Mono #  1.2 H  


 


Eos #  0.1  


 


Baso #  0.1  


 


Absolute Nucleated RBC  0.00  


 


Nucleated RBC %  0.0  


 


Sodium   135 


 


Potassium   3.6 


 


Chloride   97 L 


 


Carbon Dioxide   30 


 


Anion Gap   8.0 


 


BUN   14 


 


Creatinine   1.1 


 


Estimated GFR (MDRD)   69 L 


 


Glucose   111 H 


 


Calcium   9.1 


 


Total Bilirubin   0.6 


 


AST   16 


 


ALT   21 


 


Alkaline Phosphatase   92 


 


Troponin I    < 0.04


 


Total Protein   7.3 


 


Albumin   4.1 


 


Globulin   3.2 


 


Albumin/Globulin Ratio   1.3 


 


Lipase   < 10 L 














- Rads (name of study)


  ** ct angio


Radiology: Final report received (no pe, but there is evidence or calcification 

of the coronary arteries)





PD MEDICAL DECISION MAKING





- ED course


Complexity details: reviewed old records, reviewed results, re-evaluated patient

, considered differential, d/w patient


ED course: 





Patient was seen and examined at bedside.  Patient denied any active complaints 

but due to the history CT angio and basic blood work were ordered.  when 

patient returned from imaging the results were reviewed.  there was no acute 

PE.  Patient was made aware of the findings and was stable for discharge with 

outpatient follow up.  





Departure





- Departure


Disposition: 01 Home, Self Care


Clinical Impression: 


 Chest pain





Condition: Good


Instructions:  ED Chest Pain Atypical Unkn Cause


Follow-Up: 


Leo Ray MD [Primary Care Provider] - Tomorrow


Comments: 


Your CT today was within normal limits.  There was no PE appreciated.  You are 

still high risk so you will need to follow up with your primary doctor and 

cardiologist.  You may return to the emergency department at any time for new, 

worsening or uncontrollable symptoms.  


Discharge Date/Time: 03/28/18 22:48

## 2018-03-28 NOTE — CT REPORT
EXAM:

CT ANGIOGRAM CHEST

 

EXAM DATE: 3/28/2018 09:52 PM.

 

CLINICAL HISTORY: Chest pain, elevated d dimer.

 

COMPARISON: 05/05/2015.

 

TECHNIQUE: Routine helical imaging was performed through the chest in the pulmonary arterial phase. I
V Contrast: Nonionic. Reconstructions: Coronal 3-D MIP reconstructions.Sagittal and coronal.

 

In accordance with CT protocol optimization, one or more of the following dose reduction techniques w
ere utilized for this exam: automated exposure control, adjustment of mA and/or KV based on patient s
ize, or use of iterative reconstructive technique.

 

FINDINGS: 

Pulmonary Arteries: 

Diagnostic quality: Adequate through the segmental arteries. No evidence for acute or chronic pulmona
ry emboli. 

 

No evidence of right heart strain.

 

Lungs/Pleura: Mild bibasilar atelectasis or scarring. Unchanged 3 mm nodule in the lingula, consisten
t with benign etiology. No pleural effusion. No pneumothorax.

 

Mediastinum: Heart size is upper normal. Coronary artery calcifications. Normal-sized mediastinal lym
ph nodes. Implanted defibrillator remains in place. 

 

Thoracic Aorta: Not well enhanced. No acute abnormality seen.

 

Upper Abdomen: Unremarkable.

 

Other: None.

 

IMPRESSION: 

1. No pulmonary emboli seen. 

2. Mild bibasilar atelectasis or scarring. 

3. Borderline heart size with coronary artery calcifications.

 

[facility]

Referring Provider Line: [phone]

 

SITE ID: [id number]

## 2018-05-24 ENCOUNTER — HOSPITAL ENCOUNTER (EMERGENCY)
Dept: HOSPITAL 76 - ED | Age: 58
Discharge: HOME | End: 2018-05-24
Payer: MEDICARE

## 2018-05-24 VITALS — SYSTOLIC BLOOD PRESSURE: 114 MMHG | DIASTOLIC BLOOD PRESSURE: 65 MMHG

## 2018-05-24 DIAGNOSIS — E10.9: ICD-10-CM

## 2018-05-24 DIAGNOSIS — I11.0: ICD-10-CM

## 2018-05-24 DIAGNOSIS — Z95.810: ICD-10-CM

## 2018-05-24 DIAGNOSIS — Z95.5: ICD-10-CM

## 2018-05-24 DIAGNOSIS — I50.9: ICD-10-CM

## 2018-05-24 DIAGNOSIS — Z79.82: ICD-10-CM

## 2018-05-24 DIAGNOSIS — E78.00: ICD-10-CM

## 2018-05-24 DIAGNOSIS — I25.2: ICD-10-CM

## 2018-05-24 DIAGNOSIS — I25.10: ICD-10-CM

## 2018-05-24 DIAGNOSIS — R07.2: Primary | ICD-10-CM

## 2018-05-24 DIAGNOSIS — R94.31: ICD-10-CM

## 2018-05-24 LAB
ALBUMIN DIAFP-MCNC: 4.2 G/DL (ref 3.2–5.5)
ALBUMIN/GLOB SERPL: 1.4 {RATIO} (ref 1–2.2)
ALP SERPL-CCNC: 96 IU/L (ref 42–121)
ALT SERPL W P-5'-P-CCNC: 27 IU/L (ref 10–60)
ANION GAP SERPL CALCULATED.4IONS-SCNC: 8 MMOL/L (ref 6–13)
AST SERPL W P-5'-P-CCNC: 22 IU/L (ref 10–42)
BASOPHILS NFR BLD AUTO: 0.1 10^3/UL (ref 0–0.1)
BASOPHILS NFR BLD AUTO: 1 %
BILIRUB BLD-MCNC: 0.7 MG/DL (ref 0.2–1)
BUN SERPL-MCNC: 15 MG/DL (ref 6–20)
CALCIUM UR-MCNC: 8.8 MG/DL (ref 8.5–10.3)
CHLORIDE SERPL-SCNC: 93 MMOL/L (ref 101–111)
CO2 SERPL-SCNC: 33 MMOL/L (ref 21–32)
CREAT SERPLBLD-SCNC: 1.1 MG/DL (ref 0.6–1.2)
EOSINOPHIL # BLD AUTO: 0.1 10^3/UL (ref 0–0.7)
EOSINOPHIL NFR BLD AUTO: 0.7 %
ERYTHROCYTE [DISTWIDTH] IN BLOOD BY AUTOMATED COUNT: 16 % (ref 12–15)
GFRSERPLBLD MDRD-ARVRAT: 69 ML/MIN/{1.73_M2} (ref 89–?)
GLOBULIN SER-MCNC: 3.1 G/DL (ref 2.1–4.2)
GLUCOSE SERPL-MCNC: 204 MG/DL (ref 70–100)
HGB UR QL STRIP: 13.4 G/DL (ref 14–18)
LIPASE SERPL-CCNC: 13 U/L (ref 22–51)
LYMPHOCYTES # SPEC AUTO: 1.1 10^3/UL (ref 1.5–3.5)
LYMPHOCYTES NFR BLD AUTO: 8.5 %
MCH RBC QN AUTO: 29.2 PG (ref 27–31)
MCHC RBC AUTO-ENTMCNC: 33.5 G/DL (ref 32–36)
MCV RBC AUTO: 87.1 FL (ref 80–94)
MONOCYTES # BLD AUTO: 1 10^3/UL (ref 0–1)
MONOCYTES NFR BLD AUTO: 7.5 %
NEUTROPHILS # BLD AUTO: 10.6 10^3/UL (ref 1.5–6.6)
NEUTROPHILS # SNV AUTO: 12.9 X10^3/UL (ref 4.8–10.8)
NEUTROPHILS NFR BLD AUTO: 82.3 %
PDW BLD AUTO: 8.9 FL (ref 7.4–11.4)
PLATELET # BLD: 213 10^3/UL (ref 130–450)
PROT SPEC-MCNC: 7.3 G/DL (ref 6.7–8.2)
RBC MAR: 4.58 10^6/UL (ref 4.7–6.1)
SODIUM SERPLBLD-SCNC: 134 MMOL/L (ref 135–145)

## 2018-05-24 PROCEDURE — 83690 ASSAY OF LIPASE: CPT

## 2018-05-24 PROCEDURE — 71046 X-RAY EXAM CHEST 2 VIEWS: CPT

## 2018-05-24 PROCEDURE — 84484 ASSAY OF TROPONIN QUANT: CPT

## 2018-05-24 PROCEDURE — 93005 ELECTROCARDIOGRAM TRACING: CPT

## 2018-05-24 PROCEDURE — 85025 COMPLETE CBC W/AUTO DIFF WBC: CPT

## 2018-05-24 PROCEDURE — 80053 COMPREHEN METABOLIC PANEL: CPT

## 2018-05-24 PROCEDURE — 99283 EMERGENCY DEPT VISIT LOW MDM: CPT

## 2018-05-24 PROCEDURE — 36415 COLL VENOUS BLD VENIPUNCTURE: CPT

## 2018-05-24 NOTE — XRAY REPORT
EXAM:

CHEST RADIOGRAPHY

 

EXAM DATE: 5/24/2018 08:14 PM.

 

CLINICAL HISTORY: Chest pain.

 

COMPARISON: 12/07/2017 chest x-ray.

 

TECHNIQUE: 2 views.

 

FINDINGS: 

Lungs/Pleura: There is pulmonary vascular distention without edema or focal consolidation. Near atele
ctatic opacities seen at both lung bases. No pleural effusion or pneumothorax.

 

Mediastinum: Heart and mediastinal contours are unremarkable.

 

Other: Single-lead asymmetric or noted.

 

IMPRESSION: Pulmonary venous congestion without edema or focal airspace consolidation.

 

[facility]

Referring Provider Line: [phone]

 

SITE ID: [id number]

## 2018-05-24 NOTE — ED PHYSICIAN DOCUMENTATION
PD HPI CHEST PAIN





- Stated complaint


Stated Complaint: SOA/CHEST PX/TIGHTNESS





- Chief complaint


Chief Complaint: Cardiac





- History obtained from


History obtained from: Patient





- History of Present Illness


Timing - onset: How many hours ago (8), Today


Timing - onset during: Light activity


Timing - duration: Hours (about an hour of pain, subsequently improved after 

some NTG x 2 (or coincidental))


Timing - details: Abrupt onset, Now resolved


Quality: Aching, Pain


Location: Substernal, Left neck


Improved by: Nitro


Worsened by: No: Inspiration, Movement, Palpation


Associated symptoms: No: Shortness of air, Nausea, Feeling faint / dizzy, 

General Weakness, Palpitations


Similar symptoms before: No diagnosis (has intermittent chest pain episodes. 

Had not had one for about 4 months though. Does have history of CAD.)





Review of Systems


Constitutional: denies: Fever


Nose: denies: Rhinorrhea / runny nose, Congestion


Throat: denies: Sore throat


Cardiac: reports: Chest pain / pressure, Pedal edema (mild only).  denies: 

Palpitations, Calf pain


Respiratory: denies: Dyspnea, Cough, Wheezing


GI: denies: Abdominal Pain, Nausea, Vomiting, Diarrhea





PD PAST MEDICAL HISTORY





- Past Medical History


Cardiovascular: Congestive heart failure, Hypertension, High cholesterol, 

Coronary artery disease, MI, Other


Respiratory: Shortness of breath


Endocrine/Autoimmune: Type 1 diabetes, Other


GI: GERD, Chronic diarrhea, Other


: Benign prostate hypertrophy, Renal insuffiency, Other


HEENT: Other


Psych: Depression, Anxiety, Panic attacks, Post traumatic stress disorder


Musculoskeletal: Osteoarthritis


Derm: None





- Past Surgical History


Past Surgical History: Yes


General: Colonoscopy, EGD


Ortho: Carpal Tunnel surgery, Other


/GYN: Other


Cardiovascular: Coronary stent, AICD, Cardiac catheterization


HEENT: Other





- Present Medications


Home Medications: 


 Ambulatory Orders











 Medication  Instructions  Recorded  Confirmed


 


Pantoprazole [Protonix] 40 mg PO BIDAC 11/11/14 03/28/18


 


Spironolactone 12.5 mg PO DAILY 11/11/14 03/28/18


 


Zolpidem Tartrate 10 mg PO QPM PRN 11/11/14 03/28/18


 


raNITIdine HCl [Ranitidine HCl] 300 mg PO QPM 11/11/14 03/28/18


 


Insulin Lispro [Humalog] 1 - 11 units SQ .SLIDING SCALE 12/14/16 03/28/18


 


Pregabalin [Lyrica] 300 mg PO BID 12/14/16 03/28/18


 


Nortriptyline HCl 20 mg PO 0800,1700 05/08/17 03/28/18


 


Alprazolam 0.5 mg PO BID PRN 06/14/17 03/28/18


 


Cetirizine [ZyrTEC] 10 mg PO DAILY 06/14/17 03/28/18


 


Diclofenac Sodium [Voltaren] 4 gm TP QID PRN 06/14/17 03/28/18


 


Lidocaine Patch 5% [Lidoderm Patch] 1 each TOP DAILY PRN 06/14/17 03/28/18


 


Simvastatin 80 mg PO QPM 06/14/17 03/28/18


 


Budesonide [Entocort EC] 9 mg PO DAILY #60 capsule 06/17/17 03/28/18


 


Psyllium [Metamucil] 1 packet PO BID  packet 06/17/17 03/28/18


 


oxyCODONE [Roxicodone] 5 mg PO Q6HR #0 06/17/17 03/28/18


 


Aspirin Chewable [St Kwadwo 81 mg PO DAILY  tablet 08/06/17 03/28/18





Aspirin]   


 


DULoxetine [Cymbalta] 60 mg PO BID  capsule 08/06/17 03/28/18


 


Gabapentin [Neurontin] 300 mg PO BID  capsule 08/06/17 03/28/18


 


buPROPion [Wellbutrin Xl] 150 mg PO DAILY  tablet 08/06/17 11/25/17


 


Cholestyramine [Questran] 1 pkt PO TID 03/28/18 03/28/18


 


Furosemide 20 mg PO BID 03/28/18 03/28/18


 


Metoprolol Succinate/Hctz 1 each PO DAILY 03/28/18 03/28/18





[Metoprolol ER-Hctz 25-12.5 mg]   


 


Nitroglycerin [Nitrostat] 1 tab SL PRN PRN 03/28/18 03/28/18


 


Pramlintide Acetate [Symlinpen 60] 15 mcg SQ AC 03/28/18 03/28/18














- Allergies


Allergies/Adverse Reactions: 


 Allergies











Allergy/AdvReac Type Severity Reaction Status Date / Time


 


No Known Drug Allergies Allergy   Verified 03/28/18 18:47














- Social History


Does the pt smoke?: No


Smoking Status: Never smoker


Does the pt drink ETOH?: No


Does the pt have substance abuse?: No





- Immunizations


Immunizations are current?: Yes





- POLST


Patient has POLST: No





PD ED PE NORMAL





- Vitals


Vital signs reviewed: Yes





- General


General: Alert and oriented X 3, No acute distress (but slightly anxious), Well 

developed/nourished





- HEENT


HEENT: Pharynx benign





- Neck


Neck: Supple, no meningeal sign, No adenopathy





- Cardiac


Cardiac: RRR





- Respiratory


Respiratory: Clear bilaterally, Other (no chestwall tenderness)





- Abdomen


Abdomen: Normal bowel sounds, Soft, Non tender, Non distended, No organomegaly





- Back


Back: No CVA TTP





- Derm


Derm: Normal color, Warm and dry





- Extremities


Extremities: No deformity, No tenderness to palpate, Normal ROM s pain, No edema

, No calf tenderness / cord





- Neuro


Neuro: Alert and oriented X 3, No motor deficit, Normal speech





Results





- Vitals


Vitals: 


 Vital Signs - 24 hr











  05/24/18 05/24/18 05/24/18





  18:47 20:26 21:09


 


Temperature 36.4 C L  


 


Heart Rate 81 74 77


 


Respiratory 18 16 12





Rate   


 


Blood Pressure 128/70 119/66 114/65


 


O2 Saturation 95 98 97








 Oxygen











O2 Source [With Activity]      Room air


 


O2 Source                      Room air

















- EKG (time done)


  ** 18:44


Rate: Rate (enter#) (81)


Rhythm: NSR


Axis: Normal


Intervals: Normal AL


Ischemia: Normal ST segments.  No: ST elevation c/w ischemia, ST depression


Compare to prior EKG: Unchanged from prior EKG





- Labs


Labs: 


 Laboratory Tests











  05/24/18 05/24/18 05/24/18





  19:30 19:30 19:30


 


WBC  12.9 H  


 


RBC  4.58 L  


 


Hgb  13.4 L  


 


Hct  39.9 L  


 


MCV  87.1  


 


MCH  29.2  


 


MCHC  33.5  


 


RDW  16.0 H  


 


Plt Count  213  


 


MPV  8.9  


 


Neut #  10.6 H  


 


Lymph #  1.1 L  


 


Mono #  1.0  


 


Eos #  0.1  


 


Baso #  0.1  


 


Absolute Nucleated RBC  0.00  


 


Nucleated RBC %  0.0  


 


Sodium   134 L 


 


Potassium   3.7 


 


Chloride   93 L 


 


Carbon Dioxide   33 H 


 


Anion Gap   8.0 


 


BUN   15 


 


Creatinine   1.1 


 


Estimated GFR (MDRD)   69 L 


 


Glucose   204 H 


 


Calcium   8.8 


 


Total Bilirubin   0.7 


 


AST   22 


 


ALT   27 


 


Alkaline Phosphatase   96 


 


Troponin I    < 0.04


 


Total Protein   7.3 


 


Albumin   4.2 


 


Globulin   3.1 


 


Albumin/Globulin Ratio   1.4 


 


Lipase   13 L 














- Rads (name of study)


  ** chest xray


Radiology: Prelim report reviewed (no acute process. Pacer noted. )





PD MEDICAL DECISION MAKING





- ED course


Complexity details: considered differential (chest pain onset 8 hours ago, 

improved now after some NTG earlier in the day. Concerned about MI. Trop is 

negative. Sounds less likely angina as not exertional. ), d/w patient





Departure





- Departure


Disposition: 01 Home, Self Care


Clinical Impression: 


Chest pain


Qualifiers:


 Chest pain type: precordial pain Qualified Code(s): R07.2 - Precordial pain





Clinical Impression: 


 (Ruled Out): Myocardial infarction


Condition: Stable


Record reviewed to determine appropriate education?: Yes


Instructions:  ED Chest Pain Atypical Unkn Cause


Follow-Up: 


Leo Ray MD [Primary Care Provider] - 


Comments: 


Continue usual medications.  Follow-up with frequent need for use of the 

nitroglycerin.  Right now there is no signs of heart attack or heart failure.  

There may be noncardiac related.  See what the pattern is over the next few 

days.


Discharge Date/Time: 05/24/18 21:40

## 2018-06-03 ENCOUNTER — HOSPITAL ENCOUNTER (EMERGENCY)
Dept: HOSPITAL 76 - ED | Age: 58
LOS: 1 days | Discharge: HOME | End: 2018-06-04
Payer: MEDICARE

## 2018-06-03 DIAGNOSIS — I25.2: ICD-10-CM

## 2018-06-03 DIAGNOSIS — K21.9: ICD-10-CM

## 2018-06-03 DIAGNOSIS — E86.0: Primary | ICD-10-CM

## 2018-06-03 DIAGNOSIS — E10.65: ICD-10-CM

## 2018-06-03 DIAGNOSIS — E78.00: ICD-10-CM

## 2018-06-03 DIAGNOSIS — E10.42: ICD-10-CM

## 2018-06-03 DIAGNOSIS — Z79.82: ICD-10-CM

## 2018-06-03 DIAGNOSIS — Z79.899: ICD-10-CM

## 2018-06-03 DIAGNOSIS — N18.9: ICD-10-CM

## 2018-06-03 DIAGNOSIS — Z95.810: ICD-10-CM

## 2018-06-03 DIAGNOSIS — N40.0: ICD-10-CM

## 2018-06-03 DIAGNOSIS — I25.10: ICD-10-CM

## 2018-06-03 DIAGNOSIS — I13.0: ICD-10-CM

## 2018-06-03 DIAGNOSIS — E10.22: ICD-10-CM

## 2018-06-03 DIAGNOSIS — Z79.4: ICD-10-CM

## 2018-06-03 LAB
ALBUMIN DIAFP-MCNC: 3.9 G/DL (ref 3.2–5.5)
ALBUMIN/GLOB SERPL: 1.2 {RATIO} (ref 1–2.2)
ALP SERPL-CCNC: 117 IU/L (ref 42–121)
ALT SERPL W P-5'-P-CCNC: 21 IU/L (ref 10–60)
ANION GAP SERPL CALCULATED.4IONS-SCNC: 7 MMOL/L (ref 6–13)
AST SERPL W P-5'-P-CCNC: 19 IU/L (ref 10–42)
BASOPHILS NFR BLD AUTO: 0.1 10^3/UL (ref 0–0.1)
BASOPHILS NFR BLD AUTO: 0.5 %
BILIRUB BLD-MCNC: 0.2 MG/DL (ref 0.2–1)
BUN SERPL-MCNC: 19 MG/DL (ref 6–20)
CALCIUM UR-MCNC: 9.2 MG/DL (ref 8.5–10.3)
CHLORIDE SERPL-SCNC: 95 MMOL/L (ref 101–111)
CO2 SERPL-SCNC: 32 MMOL/L (ref 21–32)
CREAT SERPLBLD-SCNC: 1.2 MG/DL (ref 0.6–1.2)
EOSINOPHIL # BLD AUTO: 0.1 10^3/UL (ref 0–0.7)
EOSINOPHIL NFR BLD AUTO: 0.9 %
ERYTHROCYTE [DISTWIDTH] IN BLOOD BY AUTOMATED COUNT: 16.1 % (ref 12–15)
GFRSERPLBLD MDRD-ARVRAT: 62 ML/MIN/{1.73_M2} (ref 89–?)
GLOBULIN SER-MCNC: 3.3 G/DL (ref 2.1–4.2)
GLUCOSE SERPL-MCNC: 404 MG/DL (ref 70–100)
HGB UR QL STRIP: 13.4 G/DL (ref 14–18)
LIPASE SERPL-CCNC: 16 U/L (ref 22–51)
LYMPHOCYTES # SPEC AUTO: 1.5 10^3/UL (ref 1.5–3.5)
LYMPHOCYTES NFR BLD AUTO: 12.4 %
MCH RBC QN AUTO: 28.8 PG (ref 27–31)
MCHC RBC AUTO-ENTMCNC: 32.3 G/DL (ref 32–36)
MCV RBC AUTO: 89.2 FL (ref 80–94)
MONOCYTES # BLD AUTO: 0.9 10^3/UL (ref 0–1)
MONOCYTES NFR BLD AUTO: 7.7 %
NEUTROPHILS # BLD AUTO: 9.6 10^3/UL (ref 1.5–6.6)
NEUTROPHILS # SNV AUTO: 12.2 X10^3/UL (ref 4.8–10.8)
NEUTROPHILS NFR BLD AUTO: 78.5 %
PDW BLD AUTO: 9.6 FL (ref 7.4–11.4)
PLATELET # BLD: 227 10^3/UL (ref 130–450)
PROT SPEC-MCNC: 7.2 G/DL (ref 6.7–8.2)
RBC MAR: 4.66 10^6/UL (ref 4.7–6.1)
SODIUM SERPLBLD-SCNC: 134 MMOL/L (ref 135–145)

## 2018-06-03 PROCEDURE — 81001 URINALYSIS AUTO W/SCOPE: CPT

## 2018-06-03 PROCEDURE — 96361 HYDRATE IV INFUSION ADD-ON: CPT

## 2018-06-03 PROCEDURE — 96374 THER/PROPH/DIAG INJ IV PUSH: CPT

## 2018-06-03 PROCEDURE — 93005 ELECTROCARDIOGRAM TRACING: CPT

## 2018-06-03 PROCEDURE — 99284 EMERGENCY DEPT VISIT MOD MDM: CPT

## 2018-06-03 PROCEDURE — 80053 COMPREHEN METABOLIC PANEL: CPT

## 2018-06-03 PROCEDURE — 83690 ASSAY OF LIPASE: CPT

## 2018-06-03 PROCEDURE — 87086 URINE CULTURE/COLONY COUNT: CPT

## 2018-06-03 PROCEDURE — 36415 COLL VENOUS BLD VENIPUNCTURE: CPT

## 2018-06-03 PROCEDURE — 81003 URINALYSIS AUTO W/O SCOPE: CPT

## 2018-06-03 PROCEDURE — 85025 COMPLETE CBC W/AUTO DIFF WBC: CPT

## 2018-06-03 NOTE — ED PHYSICIAN DOCUMENTATION
History of Present Illness





- Stated complaint


Stated Complaint: LFT BODY PX





- Chief complaint


Chief Complaint: Neuro





- History obtained from


History obtained from: Patient





- Additonal information


Additional information: 





The patient is a 57-year-old insulin-dependent diabetic male who presents with 

left lower extremity discomfort, which he describes as an exacerbation of his 

diabetic neuropathy.  His normal doses of OxyContin and Lyrica have not 

relieved the pain today.  When he talked to the on-call physician he described 

weakness of his left lower extremity, in addition to the pain, and was advised 

to come to the emergency department for evaluation for possible stroke.  He 

denies numbness, visual disturbance, or difficulty speaking.  He denies cough, 

fever, abdominal pain, or dysuria.  He reports slight nausea earlier today, 

without vomiting.


Past medical history in addition to diabetes, is significant for myocardial 

infarction in 2012, and pacemaker placement.





Review of Systems


Constitutional: denies: Fever


Eyes: denies: Decreased vision


Ears: denies: Tinnitus/ringing


Nose: denies: Congestion


Throat: denies: Sore throat


Cardiac: denies: Chest pain / pressure


Respiratory: denies: Dyspnea, Cough


GI: reports: Nausea (Slight nausea earlier today, but not currently.).  denies: 

Abdominal Pain, Vomiting


: denies: Dysuria


Skin: denies: Rash


Musculoskeletal: reports: Extremity pain (Left lower extremity primarily.)


Neurologic: denies: Focal weakness, Numbness, Headache





PD PAST MEDICAL HISTORY





- Past Medical History


Past Medical History: Yes


Cardiovascular: Congestive heart failure, Hypertension, High cholesterol, 

Coronary artery disease, MI, Other


Respiratory: Shortness of breath


Endocrine/Autoimmune: Type 1 diabetes, Other


GI: GERD, Chronic diarrhea, Other


: Benign prostate hypertrophy, Renal insuffiency, Other


HEENT: Other


Psych: Depression, Anxiety, Panic attacks, Post traumatic stress disorder


Musculoskeletal: Osteoarthritis


Derm: None


Other Past Medical History: Peripheral Neuropathy





- Past Surgical History


Past Surgical History: Yes


General: Colonoscopy, EGD


Ortho: Carpal Tunnel surgery, Other


/GYN: Other


Cardiovascular: Coronary stent, AICD, Cardiac catheterization


HEENT: Other





- Present Medications


Home Medications: 


 Ambulatory Orders











 Medication  Instructions  Recorded  Confirmed


 


Pantoprazole [Protonix] 40 mg PO BIDAC 11/11/14 03/28/18


 


Spironolactone 12.5 mg PO DAILY 11/11/14 03/28/18


 


Zolpidem Tartrate 10 mg PO QPM PRN 11/11/14 03/28/18


 


raNITIdine HCl [Ranitidine HCl] 300 mg PO QPM 11/11/14 03/28/18


 


Insulin Lispro [Humalog] 1 - 11 units SQ .SLIDING SCALE 12/14/16 03/28/18


 


Pregabalin [Lyrica] 300 mg PO BID 12/14/16 03/28/18


 


Nortriptyline HCl 20 mg PO 0800,1700 05/08/17 03/28/18


 


Alprazolam 0.5 mg PO BID PRN 06/14/17 03/28/18


 


Cetirizine [ZyrTEC] 10 mg PO DAILY 06/14/17 03/28/18


 


Diclofenac Sodium [Voltaren] 4 gm TP QID PRN 06/14/17 03/28/18


 


Lidocaine Patch 5% [Lidoderm Patch] 1 each TOP DAILY PRN 06/14/17 03/28/18


 


Simvastatin 80 mg PO QPM 06/14/17 03/28/18


 


Budesonide [Entocort EC] 9 mg PO DAILY #60 capsule 06/17/17 03/28/18


 


Psyllium [Metamucil] 1 packet PO BID  packet 06/17/17 03/28/18


 


oxyCODONE [Roxicodone] 5 mg PO Q6HR #0 06/17/17 03/28/18


 


Aspirin Chewable [St Kwadwo 81 mg PO DAILY  tablet 08/06/17 03/28/18





Aspirin]   


 


DULoxetine [Cymbalta] 60 mg PO BID  capsule 08/06/17 03/28/18


 


Gabapentin [Neurontin] 300 mg PO BID  capsule 08/06/17 03/28/18


 


buPROPion [Wellbutrin Xl] 150 mg PO DAILY  tablet 08/06/17 11/25/17


 


Cholestyramine [Questran] 1 pkt PO TID 03/28/18 03/28/18


 


Furosemide 20 mg PO BID 03/28/18 03/28/18


 


Metoprolol Succinate/Hctz 1 each PO DAILY 03/28/18 03/28/18





[Metoprolol ER-Hctz 25-12.5 mg]   


 


Nitroglycerin [Nitrostat] 1 tab SL PRN PRN 03/28/18 03/28/18


 


Pramlintide Acetate [Symlinpen 60] 15 mcg SQ AC 03/28/18 03/28/18














- Allergies


Allergies/Adverse Reactions: 


 Allergies











Allergy/AdvReac Type Severity Reaction Status Date / Time


 


No Known Drug Allergies Allergy   Verified 03/28/18 18:47














- Social History


Does the pt smoke?: No


Smoking Status: Never smoker


Does the pt drink ETOH?: No


Does the pt have substance abuse?: No





- Immunizations


Immunizations are current?: Yes





- POLST


Patient has POLST: No





PD ED PE NORMAL





- Vitals


Vital signs reviewed: Yes (Borderline hypertension initially.)





- General


General: Alert and oriented X 3, Well developed/nourished





- HEENT


HEENT: Atraumatic, Pharynx benign, Other (Dry buccal mucosa.)





- Neck


Neck: Supple, no meningeal sign, No adenopathy





- Cardiac


Cardiac: RRR





- Respiratory


Respiratory: No respiratory distress, Clear bilaterally





- Abdomen


Abdomen: Soft, Non tender, Other (Rotund abdomen.)





- Back


Back: No CVA TTP





- Derm


Derm: No rash





- Extremities


Extremities: No edema, No calf tenderness / cord





- Neuro


Neuro: Alert and oriented X 3, No motor deficit, No sensory deficit, Normal 

speech





Results





- Vitals


Vitals: 


 Vital Signs - 24 hr











  06/03/18 06/03/18 06/03/18





  21:15 22:00 22:30


 


Temperature 36.9 C  


 


Heart Rate 97 92 87


 


Respiratory 18 16 16





Rate   


 


Blood Pressure 142/88 H 111/77 108/62


 


O2 Saturation 93 94 93














  06/04/18 06/04/18





  00:14 00:30


 


Temperature  


 


Heart Rate  88


 


Respiratory  16





Rate  


 


Blood Pressure 122/78 128/85 H


 


O2 Saturation  95








 Oxygen











O2 Source []                   Room air


 


O2 Source                      Room air

















- EKG (time done)


  ** 21:22


Rate: Rate (enter#) (87)


Rhythm: NSR


Axis: Normal


Intervals: Normal [place]


Ischemia: Q waves (in leads V2 and V3, consistent with previous anteroseptal MI.

)


Compare to prior EKG: Unchanged from prior EKG


Computer interpretation: Agree with computer





- Labs


Labs: 


 Laboratory Tests











  06/03/18 06/03/18 06/04/18





  21:20 21:20 00:29


 


WBC  12.2 H  


 


RBC  4.66 L  


 


Hgb  13.4 L  


 


Hct  41.5 L  


 


MCV  89.2  


 


MCH  28.8  


 


MCHC  32.3  


 


RDW  16.1 H  


 


Plt Count  227  


 


MPV  9.6  


 


Neut # (Auto)  9.6 H  


 


Lymph # (Auto)  1.5  


 


Mono # (Auto)  0.9  


 


Eos # (Auto)  0.1  


 


Baso # (Auto)  0.1  


 


Absolute Nucleated RBC  0.01  


 


Nucleated RBC %  0.1  


 


Sodium   134 L 


 


Potassium   3.9 


 


Chloride   95 L 


 


Carbon Dioxide   32 


 


Anion Gap   7.0 


 


BUN   19 


 


Creatinine   1.2 


 


Estimated GFR (MDRD)   62 L 


 


Glucose   404 H 


 


Calcium   9.2 


 


Total Bilirubin   0.2 


 


AST   19 


 


ALT   21 


 


Alkaline Phosphatase   117 


 


Total Protein   7.2 


 


Albumin   3.9 


 


Globulin   3.3 


 


Albumin/Globulin Ratio   1.2 


 


Lipase   16 L 


 


Urine Color    YELLOW


 


Urine Clarity    CLEAR


 


Urine pH    6.0


 


Ur Specific Gravity    1.015


 


Urine Protein    NEGATIVE


 


Urine Glucose (UA)    >=1000 H


 


Urine Ketones    NEGATIVE


 


Urine Occult Blood    NEGATIVE


 


Urine Nitrite    NEGATIVE


 


Urine Bilirubin    NEGATIVE


 


Urine Urobilinogen    0.2 (NORMAL)


 


Ur Leukocyte Esterase    NEGATIVE


 


Ur Microscopic Review    NOT INDICATED


 


Urine Culture Comments    NOT INDICATED














PD MEDICAL DECISION MAKING





- ED course


Complexity details: reviewed old records, reviewed results, re-evaluated patient

, considered differential, d/w patient


ED course: 





The patient's presentation is significant for hyperglycemia, exacerbation of 

his diabetic neuropathy, and mild dehydration.  There is no clinical evidence 

to suggest acute stroke.  Laboratory evaluation reveals elevated blood glucose 

of 404.  Urinalysis is negative except for glucosuria.  


Treatment in the emergency department included administration of normal saline 

IV.  Fentanyl 50 mcg was administered IV, with subsequent improvement in the 

patient's discomfort.  Because of his elevated blood sugar he administered an 

additional 15 units of insulin through his insulin pump.  Repeat fingerstick 

blood sugar had improved to 320 by the time of discharge.





Departure





- Departure


Disposition: 01 Home, Self Care


Clinical Impression: 


 Dehydration





Hyperglycemia due to type 2 diabetes mellitus


Qualifiers:


 Diabetes mellitus long term insulin use: with long term use Qualified Code(s): 

E11.65 - Type 2 diabetes mellitus with hyperglycemia





Condition: Stable


Instructions:  ED Hyperglycemia Diabetic, ED Dehydration


Follow-Up: 


Leo Ray MD [Provider Admit Priv/Credential] - 


Comments: 


Drink plenty of fluids.


Keep track of your blood sugars, and take additional insulin as needed.


Follow up with your primary physician within 1-2 weeks.  Call to schedule 

appointment.


Return to the emergency department if you develop increasing pain or weakness, 

persistent vomiting, recurrent dehydration, or otherwise worsening symptoms.


Discharge Date/Time: 06/04/18 00:55

## 2018-06-04 VITALS — SYSTOLIC BLOOD PRESSURE: 128 MMHG | DIASTOLIC BLOOD PRESSURE: 85 MMHG

## 2018-06-04 LAB
CLARITY UR REFRACT.AUTO: CLEAR
GLUCOSE UR QL STRIP.AUTO: >=1000 MG/DL
KETONES UR QL STRIP.AUTO: NEGATIVE MG/DL
NITRITE UR QL STRIP.AUTO: NEGATIVE
PH UR STRIP.AUTO: 6 PH (ref 5–7.5)
PROT UR STRIP.AUTO-MCNC: NEGATIVE MG/DL
RBC # UR STRIP.AUTO: NEGATIVE /UL
SP GR UR STRIP.AUTO: 1.01 (ref 1–1.03)
UROBILINOGEN UR QL STRIP.AUTO: (no result) E.U./DL
UROBILINOGEN UR STRIP.AUTO-MCNC: NEGATIVE MG/DL

## 2018-06-06 ENCOUNTER — HOSPITAL ENCOUNTER (EMERGENCY)
Dept: HOSPITAL 76 - ED | Age: 58
Discharge: HOME | End: 2018-06-06
Payer: MEDICARE

## 2018-06-06 VITALS — DIASTOLIC BLOOD PRESSURE: 79 MMHG | SYSTOLIC BLOOD PRESSURE: 114 MMHG
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
## 2018-06-06 DIAGNOSIS — Z79.82: ICD-10-CM

## 2018-06-06 DIAGNOSIS — Z79.4: ICD-10-CM

## 2018-06-06 DIAGNOSIS — S09.90XA: Primary | ICD-10-CM

## 2018-06-06 DIAGNOSIS — Z79.01: ICD-10-CM

## 2018-06-06 DIAGNOSIS — E10.9: ICD-10-CM
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
## 2018-06-06 DIAGNOSIS — I11.0: ICD-10-CM

## 2018-06-06 DIAGNOSIS — I50.9: ICD-10-CM

## 2018-06-06 PROCEDURE — 70450 CT HEAD/BRAIN W/O DYE: CPT

## 2018-06-06 PROCEDURE — 99283 EMERGENCY DEPT VISIT LOW MDM: CPT

## 2018-06-06 NOTE — ED PHYSICIAN DOCUMENTATION
PD HPI HEAD INJURY





- Stated complaint


Stated Complaint: HEAD INJURY





- Chief complaint


Chief Complaint: Trauma Hd/Nk





- History obtained from


History obtained from: Patient





- History of Present Illness


Mechanism of head injury: Fell


Where head injury occurred: Home


Timing - onset: Today


Location of injury: Top


Associated symptoms: No: LOC


Contributing factors: Anticoagulated


Similar symptoms before: No diagnosis


Recently seen: Not recently seen





- Additional information


Additional information: 





Patient is a 58 year old male with a history of diabetes and multiple ED visits 

who is presenting to the emergency department for head trauma.  patient states 

that when he got up this morning he fell forward and his head went through the 

drywall.  





Review of Systems


Ten Systems: 10 systems reviewed and negative


Neurologic: reports: Head injury.  denies: Headache, LOC





PD PAST MEDICAL HISTORY





- Past Medical History


Cardiovascular: Congestive heart failure, Hypertension, High cholesterol, 

Coronary artery disease, MI, Other


Respiratory: Shortness of breath


Endocrine/Autoimmune: Type 1 diabetes, Other


GI: GERD, Chronic diarrhea, Other


: Benign prostate hypertrophy, Renal insuffiency, Other


HEENT: Other


Psych: Depression, Anxiety, Panic attacks, Post traumatic stress disorder


Musculoskeletal: Osteoarthritis


Derm: None





- Past Surgical History


Past Surgical History: Yes


General: Colonoscopy, EGD


Ortho: Carpal Tunnel surgery, Other


/GYN: Other


Cardiovascular: Coronary stent, AICD, Cardiac catheterization


HEENT: Other





- Present Medications


Home Medications: 


 Ambulatory Orders











 Medication  Instructions  Recorded  Confirmed


 


Pantoprazole [Protonix] 40 mg PO BIDAC 11/11/14 03/28/18


 


Spironolactone 12.5 mg PO DAILY 11/11/14 03/28/18


 


Zolpidem Tartrate 10 mg PO QPM PRN 11/11/14 03/28/18


 


raNITIdine HCl [Ranitidine HCl] 300 mg PO QPM 11/11/14 03/28/18


 


Insulin Lispro [Humalog] 1 - 11 units SQ .SLIDING SCALE 12/14/16 03/28/18


 


Pregabalin [Lyrica] 300 mg PO BID 12/14/16 03/28/18


 


Nortriptyline HCl 20 mg PO 0800,1700 05/08/17 03/28/18


 


Alprazolam 0.5 mg PO BID PRN 06/14/17 03/28/18


 


Cetirizine [ZyrTEC] 10 mg PO DAILY 06/14/17 03/28/18


 


Diclofenac Sodium [Voltaren] 4 gm TP QID PRN 06/14/17 03/28/18


 


Lidocaine Patch 5% [Lidoderm Patch] 1 each TOP DAILY PRN 06/14/17 03/28/18


 


Simvastatin 80 mg PO QPM 06/14/17 03/28/18


 


Budesonide [Entocort EC] 9 mg PO DAILY #60 capsule 06/17/17 03/28/18


 


Psyllium [Metamucil] 1 packet PO BID  packet 06/17/17 03/28/18


 


oxyCODONE [Roxicodone] 5 mg PO Q6HR #0 06/17/17 03/28/18


 


Aspirin Chewable [St Kwadwo 81 mg PO DAILY  tablet 08/06/17 03/28/18





Aspirin]   


 


DULoxetine [Cymbalta] 60 mg PO BID  capsule 08/06/17 03/28/18


 


Gabapentin [Neurontin] 300 mg PO BID  capsule 08/06/17 03/28/18


 


buPROPion [Wellbutrin Xl] 150 mg PO DAILY  tablet 08/06/17 11/25/17


 


Cholestyramine [Questran] 1 pkt PO TID 03/28/18 03/28/18


 


Furosemide 20 mg PO BID 03/28/18 03/28/18


 


Metoprolol Succinate/Hctz 1 each PO DAILY 03/28/18 03/28/18





[Metoprolol ER-Hctz 25-12.5 mg]   


 


Nitroglycerin [Nitrostat] 1 tab SL PRN PRN 03/28/18 03/28/18


 


Pramlintide Acetate [Symlinpen 60] 15 mcg SQ AC 03/28/18 03/28/18














- Allergies


Allergies/Adverse Reactions: 


 Allergies











Allergy/AdvReac Type Severity Reaction Status Date / Time


 


No Known Drug Allergies Allergy   Verified 03/28/18 18:47














- Social History


Does the pt smoke?: No


Smoking Status: Never smoker


Does the pt drink ETOH?: No


Does the pt have substance abuse?: No





- Immunizations


Immunizations are current?: Yes





- POLST


Patient has POLST: No





PD ED PE NORMAL





- Vitals


Vital signs reviewed: Yes





- General


General: Alert and oriented X 3, No acute distress





- HEENT


HEENT: Atraumatic, PERRL





- Cardiac


Cardiac: RRR





- Respiratory


Respiratory: No respiratory distress





- Abdomen


Abdomen: Non distended





- Derm


Derm: Normal color, No rash





- Extremities


Extremities: No deformity





- Neuro


Neuro: Alert and oriented X 3, CNs 2-12 intact, No motor deficit, Normal speech


Eye Opening: Spontaneous


Motor: Obeys Commands


Verbal: Oriented


GCS Score: 15





Results





- Vitals


Vitals: 


 Vital Signs - 24 hr











  06/06/18 06/06/18





  05:15 06:25


 


Temperature 36.8 C 


 


Heart Rate 94 87


 


Respiratory 18 18





Rate  


 


Blood Pressure 126/75 114/79


 


O2 Saturation 95 100








 Oxygen











O2 Source [With Activity]      Room air


 


O2 Source                      Room air

















- Rads (name of study)


  ** ct head


Radiology: Final report received (no acute intracranial pathology)





PD MEDICAL DECISION MAKING





- ED course


Complexity details: reviewed old records, reviewed results, re-evaluated patient

, considered differential, d/w patient


ED course: 





Patient was seen and examined at bedside.  Due to the unclear etiology and the 

fact that the patient was on blood thinners imaging was ordered.  When patient 

returned from imaging the results were reviewed.  there were no signs of 

intracranial hemorrhage.  Patient required no further work up at this time and 

was stable for discharge with outpatient follow up.  





Departure





- Departure


Disposition: 01 Home, Self Care


Clinical Impression: 


 Closed head injury





Condition: Good


Instructions:  ED Head Injury Closed


Follow-Up: 


Leo Ray MD [Primary Care Provider] - As Needed


Comments: 


Your diagnostics today were within normal limits.  there is no acute 

intracranial pathology.  You can take tylenol as needed for pain.  You should 

follow up with your doctor if symptoms persist.  You may return to the 

emergency department at any time for new, worsening or uncontrollable symptoms.

## 2018-06-06 NOTE — CT REPORT
CT BRAIN WITHOUT CONTRAST:  06/06/2018

 

CLINICAL INDICATION:  Trauma, on blood thinners.

 

COMPARISON:  06/14/2017.

 

TECHNIQUE:  Axial CT images of the brain were obtained without intravenous contrast.

 

FINDINGS:  The ventricles and sulci are normal in size, shape and configuration.  The basilar 

cisterns are patent.  There is no evidence of intracranial hemorrhage, mass effect, or midline 

shift.  The visualized orbital contents are unremarkable.

 

IMPRESSION:  NORMAL CT OF THE HEAD WITHOUT CONTRAST.  NO SIGNIFICANT INTERVAL CHANGE.

 

CT DOSE REDUCTION STATEMENT

 

In accordance with CT protocol optimization, one or more of the following dose reduction 

techniques were utilized for this exam:  automated exposure control, adjustment of mA and/or KV

based on patient size, or use of iterative reconstructive technique.

 

 

DD: 06/06/2018 05:59

TD: 06/06/2018 06:06

Job #: [id number]

## 2018-06-17 ENCOUNTER — HOSPITAL ENCOUNTER (EMERGENCY)
Dept: HOSPITAL 76 - ED | Age: 58
Discharge: HOME | End: 2018-06-17
Payer: MEDICARE

## 2018-06-17 VITALS — DIASTOLIC BLOOD PRESSURE: 81 MMHG | SYSTOLIC BLOOD PRESSURE: 113 MMHG

## 2018-06-17 DIAGNOSIS — I11.0: ICD-10-CM

## 2018-06-17 DIAGNOSIS — Z95.0: ICD-10-CM
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
## 2018-06-17 DIAGNOSIS — E78.00: ICD-10-CM

## 2018-06-17 DIAGNOSIS — Z79.82: ICD-10-CM

## 2018-06-17 DIAGNOSIS — E10.9: ICD-10-CM

## 2018-06-17 DIAGNOSIS — Z79.4: ICD-10-CM

## 2018-06-17 DIAGNOSIS — I50.9: ICD-10-CM

## 2018-06-17 DIAGNOSIS — I25.10: ICD-10-CM

## 2018-06-17 DIAGNOSIS — S09.90XA: Primary | ICD-10-CM
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
## 2018-06-17 DIAGNOSIS — K21.9: ICD-10-CM

## 2018-06-17 LAB
ANION GAP SERPL CALCULATED.4IONS-SCNC: 7 MMOL/L
BUN SERPL-MCNC: 16 MG/DL
CALCIUM UR-MCNC: 9 MG/DL
CHLORIDE SERPL-SCNC: 97 MMOL/L
CLARITY UR REFRACT.AUTO: CLEAR
CO2 SERPL-SCNC: 32 MMOL/L
CREAT SERPLBLD-SCNC: 1 MG/DL
GFRSERPLBLD MDRD-ARVRAT: 77 ML/MIN/{1.73_M2}
GLUCOSE SERPL-MCNC: 229 MG/DL
GLUCOSE UR QL STRIP.AUTO: 500 MG/DL
INR PPP: 1
KETONES UR QL STRIP.AUTO: NEGATIVE MG/DL
NITRITE UR QL STRIP.AUTO: NEGATIVE
PH UR STRIP.AUTO: 5.5 PH
PROT UR STRIP.AUTO-MCNC: NEGATIVE MG/DL
PROTHROM ACT/NOR PPP: 11.3 SECS
RBC # UR STRIP.AUTO: NEGATIVE /UL
SODIUM SERPLBLD-SCNC: 136 MMOL/L
SP GR UR STRIP.AUTO: 1.02
UROBILINOGEN UR QL STRIP.AUTO: (no result) E.U./DL
UROBILINOGEN UR STRIP.AUTO-MCNC: NEGATIVE MG/DL

## 2018-06-17 PROCEDURE — 81001 URINALYSIS AUTO W/SCOPE: CPT

## 2018-06-17 PROCEDURE — 81003 URINALYSIS AUTO W/O SCOPE: CPT

## 2018-06-17 PROCEDURE — 72125 CT NECK SPINE W/O DYE: CPT

## 2018-06-17 PROCEDURE — 93005 ELECTROCARDIOGRAM TRACING: CPT

## 2018-06-17 PROCEDURE — 36415 COLL VENOUS BLD VENIPUNCTURE: CPT

## 2018-06-17 PROCEDURE — 87086 URINE CULTURE/COLONY COUNT: CPT

## 2018-06-17 PROCEDURE — 84484 ASSAY OF TROPONIN QUANT: CPT

## 2018-06-17 PROCEDURE — 99283 EMERGENCY DEPT VISIT LOW MDM: CPT

## 2018-06-17 PROCEDURE — 99284 EMERGENCY DEPT VISIT MOD MDM: CPT

## 2018-06-17 PROCEDURE — 70450 CT HEAD/BRAIN W/O DYE: CPT

## 2018-06-17 PROCEDURE — 85610 PROTHROMBIN TIME: CPT

## 2018-06-17 PROCEDURE — 80048 BASIC METABOLIC PNL TOTAL CA: CPT

## 2018-06-17 NOTE — CT REPORT
Procedure Date:  06/17/2018   

Accession Number:  [id number] / [id number]                    

Procedure:  CT  - Head W/O CPT Code:  

 

FULL RESULT:

 

 

EXAM:

CT HEAD

 

EXAM DATE: 6/17/2018 11:44 AM.

 

CLINICAL HISTORY: Acute pain due to trauma.

 

COMPARISON: 06/14/2017.

 

TECHNIQUE: Multiaxial CT images were obtained from the foramen magnum to 

the vertex. Reformats: Coronal. IV contrast: None.

 

In accordance with CT protocol optimization, one or more of the following 

dose reduction techniques were utilized for this exam: automated exposure 

control, adjustment of mA and/or KV based on patient size, or use of 

iterative reconstructive technique.

 

FINDINGS:

Parenchyma: No intraparenchymal hemorrhage. No evidence of mass, midline 

shift, or CT findings of infarction. Gray-white differentiation is 

distinct.

 

Extraaxial Spaces: Normal for age. No subdural or epidural collections 

identified.

 

Ventricles: Normal in size and position.

 

Sinuses and Orbits: Mild periosteal thickening and mild opacification of 

the maxillary and ethmoid sinuses is seen. There are no air-fluid levels 

identified. Is similar to prior study. Minor fluid opacification of the 

left mastoid air cells is present. The appearance is similar to prior 

study. Intraorbital structures are unremarkable.

 

Bones: No evidence of fracture or calvarial defect.

 

Other: None.

IMPRESSION:

1. No acute intracranial abnormality demonstrated.

2. Mild chronic bilateral maxillary and ethmoid sinus disease.

3. Minor nonspecific fluid opacification of the left mastoid air cells, 

also chronic.

 

[facility]

## 2018-06-17 NOTE — CT REPORT
Procedure Date:  06/17/2018   

Accession Number:  [id number] / [id number]                    

Procedure:  CT  - Cervical Spine W/O CPT Code:  

 

FULL RESULT:

 

 

EXAM:

CT CERVICAL SPINE WITHOUT CONTRAST

 

DATE: 6/17/2018 11:44 AM.

 

HISTORY: Acute pain due to trauma.

 

COMPARISONS: None.

 

TECHNIQUE: Thin-section axial images were acquired of the cervical spine 

without contrast. Post-processing: Coronal and sagittal reformats. Other: 

None.

 

In accordance with CT protocol optimization, one or more of the following 

dose reduction techniques were utilized for this exam: automated exposure 

control, adjustment of mA and/or KV based on patient size, or use of 

iterative reconstructive technique.

 

FINDINGS:

Alignment: No scoliosis or spondylolisthesis.

 

Bones: Motion artifact obscures detailed evaluation of the bone and the 

lower cervical spine. No fracture or bone lesion is identified.

 

Interspace Levels/Facets: There is minimal diffuse degenerative disk and 

facet disease seen throughout the lower aspects of the cervical spine. 

The bony central canal and neuroforamina appear relatively patent.

 

Musculature: Normal. No fatty atrophy.

 

Other: The paravertebral and prevertebral soft tissues are unremarkable. 

The lung apices are clear.

IMPRESSION: No acute findings. Minor degenerative changes seen in the 

lower cervical spine.

 

[facility]

## 2018-06-17 NOTE — ED PHYSICIAN DOCUMENTATION
History of Present Illness





- Stated complaint


Stated Complaint: GLF/HEAD LAC





- Chief complaint


Chief Complaint: Neuro





- Additonal information


Additional information: 





hx from pt


58 male


walking dog


felt weak


fell over backward and hit head


thinks he is on a blood thinner does not kow the name (but will log on to his 

[facility] and get us a med list)


no LOC


has a HA


no neck pain


no NV


no recent fever cough NVD


had no CP palp soa diaphoresis before fall


hx falls several X a year - has a walker and a cane





Review of Systems


Constitutional: denies: Fever, Chills


Cardiac: denies: Chest pain / pressure, Palpitations


Respiratory: denies: Dyspnea, Cough


GI: denies: Abdominal Pain, Nausea, Vomiting


Musculoskeletal: denies: Neck pain


Neurologic: reports: Generalized weakness, Headache, Head injury.  denies: 

Focal weakness, Numbness, Syncope, Seizure


Immunocompromised: denies: Immunocompromised





PD PAST MEDICAL HISTORY





- Past Medical History


Cardiovascular: Congestive heart failure, Hypertension, High cholesterol, 

Coronary artery disease, MI, Other


Respiratory: Shortness of breath


Endocrine/Autoimmune: Type 1 diabetes, Other


GI: GERD, Chronic diarrhea, Other


: Benign prostate hypertrophy, Renal insuffiency, Other


HEENT: Other


Psych: Depression, Anxiety, Panic attacks, Post traumatic stress disorder


Musculoskeletal: Osteoarthritis


Derm: None





- Past Surgical History


Past Surgical History: Yes


General: Colonoscopy, EGD


Ortho: Carpal Tunnel surgery, Other


/GYN: Other


Cardiovascular: Coronary stent, AICD, Cardiac catheterization


HEENT: Other





- Present Medications


Home Medications: 


 Ambulatory Orders











 Medication  Instructions  Recorded  Confirmed


 


Pantoprazole [Protonix] 40 mg PO BIDAC 11/11/14 03/28/18


 


Spironolactone 12.5 mg PO DAILY 11/11/14 03/28/18


 


Zolpidem Tartrate 10 mg PO QPM PRN 11/11/14 03/28/18


 


raNITIdine HCl [Ranitidine HCl] 300 mg PO QPM 11/11/14 03/28/18


 


Insulin Lispro [Humalog] 1 - 11 units SQ .SLIDING SCALE 12/14/16 03/28/18


 


Pregabalin [Lyrica] 300 mg PO BID 12/14/16 03/28/18


 


Nortriptyline HCl 20 mg PO 0800,1700 05/08/17 03/28/18


 


Alprazolam 0.5 mg PO BID PRN 06/14/17 03/28/18


 


Cetirizine [ZyrTEC] 10 mg PO DAILY 06/14/17 03/28/18


 


Diclofenac Sodium [Voltaren] 4 gm TP QID PRN 06/14/17 03/28/18


 


Lidocaine Patch 5% [Lidoderm Patch] 1 each TOP DAILY PRN 06/14/17 03/28/18


 


Simvastatin 80 mg PO QPM 06/14/17 03/28/18


 


Budesonide [Entocort EC] 9 mg PO DAILY #60 capsule 06/17/17 03/28/18


 


Psyllium [Metamucil] 1 packet PO BID  packet 06/17/17 03/28/18


 


oxyCODONE [Roxicodone] 5 mg PO Q6HR #0 06/17/17 03/28/18


 


Aspirin Chewable [St Kwadwo 81 mg PO DAILY  tablet 08/06/17 03/28/18





Aspirin]   


 


DULoxetine [Cymbalta] 60 mg PO BID  capsule 08/06/17 03/28/18


 


Gabapentin [Neurontin] 300 mg PO BID  capsule 08/06/17 03/28/18


 


buPROPion [Wellbutrin Xl] 150 mg PO DAILY  tablet 08/06/17 11/25/17


 


Cholestyramine [Questran] 1 pkt PO TID 03/28/18 03/28/18


 


Furosemide 20 mg PO BID 03/28/18 03/28/18


 


Metoprolol Succinate/Hctz 1 each PO DAILY 03/28/18 03/28/18





[Metoprolol ER-Hctz 25-12.5 mg]   


 


Nitroglycerin [Nitrostat] 1 tab SL PRN PRN 03/28/18 03/28/18


 


Pramlintide Acetate [Symlinpen 60] 15 mcg SQ AC 03/28/18 03/28/18


 


Doxazosin [Cardura] 8 mg PO DAILY 06/17/18 06/17/18


 


Hydrocortisone [Cortef]  06/17/18 


 


Prasugrel HCl 10 mg PO 06/17/18 














- Allergies


Allergies/Adverse Reactions: 


 Allergies











Allergy/AdvReac Type Severity Reaction Status Date / Time


 


No Known Drug Allergies Allergy   Verified 03/28/18 18:47














- Social History


Does the pt smoke?: No


Smoking Status: Never smoker


Does the pt drink ETOH?: No


Does the pt have substance abuse?: No





- Immunizations


Immunizations are current?: Yes





- POLST


Patient has POLST: No





PD ED PE NORMAL





- Vitals


Vital signs reviewed: Yes





- General


General: Alert and oriented X 3





- HEENT


HEENT: No: Atraumatic (abrasion and swelling posterior scalp)





- Neck


Neck: No bony TTP (but will image 2/2 mechanism)





- Cardiac


Cardiac: RRR





- Respiratory


Respiratory: No respiratory distress, Clear bilaterally





- Abdomen


Abdomen: Non tender





- Extremities


Extremities: No deformity, No tenderness to palpate, Normal ROM s pain, No edema

, No calf tenderness / cord





- Neuro


Neuro: Alert and oriented X 3, CNs 2-12 intact, No motor deficit, No sensory 

deficit, Normal speech


Eye Opening: Spontaneous


Motor: Obeys Commands


Verbal: Oriented


GCS Score: 15





Results





- Vitals


Vitals: 


 Vital Signs - 24 hr











  06/17/18 06/17/18





  10:52 13:10


 


Temperature 36.7 C 


 


Heart Rate 99 86


 


Respiratory 18 16





Rate  


 


Blood Pressure 143/77 H 113/81 H


 


O2 Saturation 95 97








 Oxygen











O2 Source [With Activity]      Room air


 


O2 Source                      Room air

















- EKG (time done)


  ** 1126


Rate: Rate (enter#) (92)


Rhythm: NSR


Ischemia: Q waves (anterior)


Other comments: Other comments (no change from 6/3 which was no change from 5/24

)





- Labs


Labs: 


 Laboratory Tests











  06/17/18 06/17/18 06/17/18





  10:58 11:30 11:30


 


PT   


 


INR   


 


Sodium   136 


 


Potassium   3.6 


 


Chloride   97 L 


 


Carbon Dioxide   32 


 


Anion Gap   7.0 


 


BUN   16 


 


Creatinine   1.0 


 


Estimated GFR (MDRD)   77 L 


 


Glucose   229 H 


 


POC Whole Bld Glucose  249 H  


 


Calcium   9.0 


 


Troponin I    < 0.04


 


Urine Color   


 


Urine Clarity   


 


Urine pH   


 


Ur Specific Gravity   


 


Urine Protein   


 


Urine Glucose (UA)   


 


Urine Ketones   


 


Urine Occult Blood   


 


Urine Nitrite   


 


Urine Bilirubin   


 


Urine Urobilinogen   


 


Ur Leukocyte Esterase   


 


Ur Microscopic Review   


 


Urine Culture Comments   














  06/17/18 06/17/18





  11:30 12:10


 


PT  11.3 


 


INR  1.0 


 


Sodium  


 


Potassium  


 


Chloride  


 


Carbon Dioxide  


 


Anion Gap  


 


BUN  


 


Creatinine  


 


Estimated GFR (MDRD)  


 


Glucose  


 


POC Whole Bld Glucose  


 


Calcium  


 


Troponin I  


 


Urine Color   YELLOW


 


Urine Clarity   CLEAR


 


Urine pH   5.5


 


Ur Specific Gravity   1.025


 


Urine Protein   NEGATIVE


 


Urine Glucose (UA)   500 H


 


Urine Ketones   NEGATIVE


 


Urine Occult Blood   NEGATIVE


 


Urine Nitrite   NEGATIVE


 


Urine Bilirubin   NEGATIVE


 


Urine Urobilinogen   0.2 (NORMAL)


 


Ur Leukocyte Esterase   NEGATIVE


 


Ur Microscopic Review   NOT INDICATED


 


Urine Culture Comments   NOT INDICATED














- Rads (name of study)


  ** CTH


Radiology: See rad report (no acute)





  ** CT CS


Radiology: See rad report (no acute)





PD MEDICAL DECISION MAKING





- ED course


ED course: 





no cause for fall found on EKG and labs - pt with hx same


is on anticoag so got CTH - no bleed


will dc with precautions and next day fup for a recheck (chance of delayed 

bleed low but a recheck would be prudent)





- Sepsis Event


Vital Signs: 


 Vital Signs - 24 hr











  06/17/18 06/17/18





  10:52 13:10


 


Temperature 36.7 C 


 


Heart Rate 99 86


 


Respiratory 18 16





Rate  


 


Blood Pressure 143/77 H 113/81 H


 


O2 Saturation 95 97








 Oxygen











O2 Source [With Activity]      Room air


 


O2 Source                      Room air

















Departure





- Departure


Disposition: 01 Home, Self Care


Clinical Impression: 


Fall


Qualifiers:


 Encounter type: initial encounter Qualified Code(s): W19.XXXA - Unspecified 

fall, initial encounter





Head injury


Qualifiers:


 Encounter type: initial encounter Qualified Code(s): S09.90XA - Unspecified 

injury of head, initial encounter





Condition: Good


Instructions:  ED Head Injury Closed


Follow-Up: 


Leo Ray MD [Primary Care Provider] - 


Comments: 


The CT scan did not show any skull fracture or brain bleeding


You can go home - but because you are on the presugrel you need to stay with a 

responsible adult who can watch over you and bring you back to the ER if you 

get worse and you need to follow up with your PMD tomorrow for a recheck (if 

your PMD is far away you can come back to the ER)


Your labs and EKG were OK - no cause for the fall was found. Please continue to 

use your cane and / or walker as needed


Tylenol as needed for pain


Ice for 20 minutes at a time to decrease the swelling


Wash and then apply antibiotic ointment to the abrasion on your scalp twice a 

day for a week

## 2018-06-23 ENCOUNTER — HOSPITAL ENCOUNTER (OUTPATIENT)
Dept: HOSPITAL 76 - DI | Age: 58
Discharge: HOME | End: 2018-06-23
Attending: PODIATRIST
Payer: MEDICARE

## 2018-06-23 DIAGNOSIS — M19.072: Primary | ICD-10-CM

## 2018-06-23 NOTE — XRAY REPORT
Procedure Date:  06/23/2018   

Accession Number:  [id number] / [id number]                    

Procedure:  XR  - Foot 3 View LT CPT Code:  

 

FULL RESULT:

 

 

EXAM:

LEFT FOOT RADIOGRAPHY

 

EXAM DATE: 6/23/2018 02:43 PM.

 

CLINICAL HISTORY: Left foot pain along plantar surface of fifth metatarsal

 

COMPARISON: None.

 

TECHNIQUE: 3 views.

 

FINDINGS:

Bones: Irregular contour and sclerosis of the second proximal phalangeal 

base. Accessory os peroneum. Small posterior calcaneal spur.

 

Joints: Normal alignment. Mild joint space loss and subchondral 

degenerative changes at the interphalangeal joints, compatible with 

osteoarthritis.

 

Soft Tissues: No evident focal soft tissue swelling.

IMPRESSION:

1. Irregular contour and sclerosis of the second proximal phalangeal 

base, compatible remote versus subacute fracture.

2. Mild osteoarthritis of the interphalangeal joints.

 

[facility]

## 2018-06-25 ENCOUNTER — HOSPITAL ENCOUNTER (EMERGENCY)
Dept: HOSPITAL 76 - ED | Age: 58
Discharge: HOME | End: 2018-06-25
Payer: MEDICARE

## 2018-06-25 ENCOUNTER — HOSPITAL ENCOUNTER (OUTPATIENT)
Dept: HOSPITAL 76 - EMS | Age: 58
Discharge: ANOTHER HOSPITAL | End: 2018-06-25
Attending: SURGERY
Payer: MEDICARE

## 2018-06-25 VITALS — DIASTOLIC BLOOD PRESSURE: 53 MMHG | SYSTOLIC BLOOD PRESSURE: 109 MMHG

## 2018-06-25 DIAGNOSIS — Z95.0: ICD-10-CM

## 2018-06-25 DIAGNOSIS — E78.00: ICD-10-CM

## 2018-06-25 DIAGNOSIS — I11.0: ICD-10-CM

## 2018-06-25 DIAGNOSIS — I50.9: ICD-10-CM

## 2018-06-25 DIAGNOSIS — R03.1: ICD-10-CM

## 2018-06-25 DIAGNOSIS — R06.02: Primary | ICD-10-CM

## 2018-06-25 DIAGNOSIS — E10.9: ICD-10-CM

## 2018-06-25 DIAGNOSIS — J44.9: ICD-10-CM

## 2018-06-25 DIAGNOSIS — I25.10: ICD-10-CM

## 2018-06-25 DIAGNOSIS — R42: ICD-10-CM

## 2018-06-25 DIAGNOSIS — Z79.4: ICD-10-CM

## 2018-06-25 DIAGNOSIS — K21.9: ICD-10-CM

## 2018-06-25 DIAGNOSIS — J18.1: Primary | ICD-10-CM

## 2018-06-25 LAB
ALBUMIN DIAFP-MCNC: 3.3 G/DL (ref 3.2–5.5)
ALBUMIN/GLOB SERPL: 1.1 {RATIO} (ref 1–2.2)
ALP SERPL-CCNC: 83 IU/L (ref 42–121)
ALT SERPL W P-5'-P-CCNC: 19 IU/L (ref 10–60)
ANION GAP SERPL CALCULATED.4IONS-SCNC: 6 MMOL/L (ref 6–13)
AST SERPL W P-5'-P-CCNC: 15 IU/L (ref 10–42)
BASOPHILS NFR BLD AUTO: 0 10^3/UL (ref 0–0.1)
BASOPHILS NFR BLD AUTO: 0.3 %
BILIRUB BLD-MCNC: 0.4 MG/DL (ref 0.2–1)
BUN SERPL-MCNC: 23 MG/DL (ref 6–20)
CALCIUM UR-MCNC: 8.3 MG/DL (ref 8.5–10.3)
CHLORIDE SERPL-SCNC: 99 MMOL/L (ref 101–111)
CO2 SERPL-SCNC: 31 MMOL/L (ref 21–32)
CREAT SERPLBLD-SCNC: 1.2 MG/DL (ref 0.6–1.2)
EOSINOPHIL # BLD AUTO: 0.1 10^3/UL (ref 0–0.7)
EOSINOPHIL NFR BLD AUTO: 1 %
ERYTHROCYTE [DISTWIDTH] IN BLOOD BY AUTOMATED COUNT: 15.1 % (ref 12–15)
GFRSERPLBLD MDRD-ARVRAT: 62 ML/MIN/{1.73_M2} (ref 89–?)
GLOBULIN SER-MCNC: 2.9 G/DL (ref 2.1–4.2)
GLUCOSE SERPL-MCNC: 154 MG/DL (ref 70–100)
HGB UR QL STRIP: 12.2 G/DL (ref 14–18)
LIPASE SERPL-CCNC: 18 U/L (ref 22–51)
LYMPHOCYTES # SPEC AUTO: 1.1 10^3/UL (ref 1.5–3.5)
LYMPHOCYTES NFR BLD AUTO: 10.3 %
MCH RBC QN AUTO: 30.1 PG (ref 27–31)
MCHC RBC AUTO-ENTMCNC: 33.2 G/DL (ref 32–36)
MCV RBC AUTO: 90.7 FL (ref 80–94)
MONOCYTES # BLD AUTO: 0.9 10^3/UL (ref 0–1)
MONOCYTES NFR BLD AUTO: 8.2 %
NEUTROPHILS # BLD AUTO: 8.7 10^3/UL (ref 1.5–6.6)
NEUTROPHILS # SNV AUTO: 10.8 X10^3/UL (ref 4.8–10.8)
NEUTROPHILS NFR BLD AUTO: 80.2 %
PDW BLD AUTO: 9.1 FL (ref 7.4–11.4)
PLATELET # BLD: 198 10^3/UL (ref 130–450)
PROT SPEC-MCNC: 6.2 G/DL (ref 6.7–8.2)
RBC MAR: 4.05 10^6/UL (ref 4.7–6.1)
SODIUM SERPLBLD-SCNC: 136 MMOL/L (ref 135–145)

## 2018-06-25 PROCEDURE — 99284 EMERGENCY DEPT VISIT MOD MDM: CPT

## 2018-06-25 PROCEDURE — 85025 COMPLETE CBC W/AUTO DIFF WBC: CPT

## 2018-06-25 PROCEDURE — 83690 ASSAY OF LIPASE: CPT

## 2018-06-25 PROCEDURE — 84484 ASSAY OF TROPONIN QUANT: CPT

## 2018-06-25 PROCEDURE — 36415 COLL VENOUS BLD VENIPUNCTURE: CPT

## 2018-06-25 PROCEDURE — 94664 DEMO&/EVAL PT USE INHALER: CPT

## 2018-06-25 PROCEDURE — 83880 ASSAY OF NATRIURETIC PEPTIDE: CPT

## 2018-06-25 PROCEDURE — 71046 X-RAY EXAM CHEST 2 VIEWS: CPT

## 2018-06-25 PROCEDURE — 80053 COMPREHEN METABOLIC PANEL: CPT

## 2018-06-25 PROCEDURE — 96365 THER/PROPH/DIAG IV INF INIT: CPT

## 2018-06-25 PROCEDURE — 93005 ELECTROCARDIOGRAM TRACING: CPT

## 2018-06-25 NOTE — ED PHYSICIAN DOCUMENTATION
PD HPI DYSPNEA





- Stated complaint


Stated Complaint: SOA





- Chief complaint


Chief Complaint: Resp





- History obtained from


History obtained from: Patient





- History of Present Illness


Timing - onset: How many days ago (3)


Timing - onset during: Light activity


Timing - duration: Days (3)


Timing - details: Gradual onset, Still present


Improved by: Rest


Worsened by: Exertion, Coughing


Associated symptoms: Cough, Wheezing, Chest pain / discomfort, Bilateral edema.

  No: Fever


Similar symptoms before: Diagnosis (COPD and CHF)


Recently seen: Emergency Dept





- Additional information


Additional information: 





59 y/o male well known to the ED has developed increasing exertional dyspnea. 

He denies orthopnea but acknowledges exertional dyspena and chest pressure. He 

relates the time frame as the past 3 days . He has a cough as well. 





Review of Systems


Constitutional: denies: Fever


Eyes: denies: Decreased vision


Ears: denies: Ear pain


Nose: reports: Congestion


Throat: denies: Sore throat


Cardiac: reports: Chest pain / pressure.  denies: Palpitations, Pedal edema, 

Calf pain


Respiratory: reports: Dyspnea, Cough, Wheezing


GI: denies: Abdominal Pain, Nausea, Vomiting


: denies: Dysuria, Frequency





PD PAST MEDICAL HISTORY





- Past Medical History


Cardiovascular: Congestive heart failure, Hypertension, High cholesterol, 

Coronary artery disease, MI, Other


Respiratory: COPD, Shortness of breath


Endocrine/Autoimmune: Type 1 diabetes, Other


GI: GERD, Chronic diarrhea, Other


: Benign prostate hypertrophy, Renal insuffiency, Other


HEENT: Other


Psych: Depression, Anxiety, Panic attacks, Post traumatic stress disorder


Musculoskeletal: Osteoarthritis


Derm: None





- Past Surgical History


Past Surgical History: Yes


General: Colonoscopy, EGD


Ortho: Carpal Tunnel surgery, Other


/GYN: Other


Cardiovascular: Coronary stent, AICD, Cardiac catheterization


HEENT: Other





- Present Medications


Home Medications: 


 Ambulatory Orders











 Medication  Instructions  Recorded  Confirmed


 


Pantoprazole [Protonix] 40 mg PO BIDAC 11/11/14 03/28/18


 


Spironolactone 12.5 mg PO DAILY 11/11/14 03/28/18


 


Zolpidem Tartrate 10 mg PO QPM PRN 11/11/14 03/28/18


 


raNITIdine HCl [Ranitidine HCl] 300 mg PO QPM 11/11/14 03/28/18


 


Insulin Lispro [Humalog] 1 - 11 units SQ .SLIDING SCALE 12/14/16 03/28/18


 


Pregabalin [Lyrica] 300 mg PO BID 12/14/16 03/28/18


 


Nortriptyline HCl 20 mg PO 0800,1700 05/08/17 03/28/18


 


Alprazolam 0.5 mg PO BID PRN 06/14/17 03/28/18


 


Cetirizine [ZyrTEC] 10 mg PO DAILY 06/14/17 03/28/18


 


Diclofenac Sodium [Voltaren] 4 gm TP QID PRN 06/14/17 03/28/18


 


Lidocaine Patch 5% [Lidoderm Patch] 1 each TOP DAILY PRN 06/14/17 03/28/18


 


Simvastatin 80 mg PO QPM 06/14/17 03/28/18


 


Budesonide [Entocort EC] 9 mg PO DAILY #60 capsule 06/17/17 03/28/18


 


Psyllium [Metamucil] 1 packet PO BID  packet 06/17/17 03/28/18


 


oxyCODONE [Roxicodone] 5 mg PO Q6HR #0 06/17/17 03/28/18


 


Aspirin Chewable [St Kwadwo 81 mg PO DAILY  tablet 08/06/17 03/28/18





Aspirin]   


 


DULoxetine [Cymbalta] 60 mg PO BID  capsule 08/06/17 03/28/18


 


Gabapentin [Neurontin] 300 mg PO BID  capsule 08/06/17 03/28/18


 


buPROPion [Wellbutrin Xl] 150 mg PO DAILY  tablet 08/06/17 11/25/17


 


Cholestyramine [Questran] 1 pkt PO TID 03/28/18 03/28/18


 


Furosemide 20 mg PO BID 03/28/18 03/28/18


 


Metoprolol Succinate/Hctz 1 each PO DAILY 03/28/18 03/28/18





[Metoprolol ER-Hctz 25-12.5 mg]   


 


Nitroglycerin [Nitrostat] 1 tab SL PRN PRN 03/28/18 03/28/18


 


Pramlintide Acetate [Symlinpen 60] 15 mcg SQ AC 03/28/18 03/28/18


 


Doxazosin [Cardura] 8 mg PO DAILY 06/17/18 06/17/18


 


Hydrocortisone [Cortef]  06/17/18 


 


Prasugrel HCl 10 mg PO 06/17/18 


 


Azithromycin [Zithromax] 250 mg PO DAILY #6 tablet 06/25/18 














- Allergies


Allergies/Adverse Reactions: 


 Allergies











Allergy/AdvReac Type Severity Reaction Status Date / Time


 


No Known Drug Allergies Allergy   Verified 03/28/18 18:47














- Social History


Does the pt smoke?: No


Smoking Status: Never smoker


Does the pt drink ETOH?: No


Does the pt have substance abuse?: No





- Immunizations


Immunizations are current?: Yes





- POLST


Patient has POLST: No





PD ED PE NORMAL





- Vitals


Vital signs reviewed: Yes (normal )





- General


General: Alert and oriented X 3, No acute distress, Well developed/nourished





- HEENT


HEENT: Atraumatic, PERRL, EOMI





- Neck


Neck: Supple, no meningeal sign





- Cardiac


Cardiac: RRR, No murmur





- Respiratory


Respiratory: No respiratory distress, Other (rhonchi is light in the left base )





- Abdomen


Abdomen: Soft, Non tender





- Back


Back: No CVA TTP, No spinal TTP





- Derm


Derm: Normal color, Warm and dry, No rash





- Extremities


Extremities: No deformity, Other (trace edema bilaterally )





- Neuro


Neuro: Alert and oriented X 3, No motor deficit, No sensory deficit, Normal 

speech


Eye Opening: Spontaneous


Motor: Obeys Commands


Verbal: Oriented


GCS Score: 15





- Psych


Psych: Normal mood, Normal affect





Results





- Vitals


Vitals: 


 Vital Signs - 24 hr











  06/25/18 06/25/18 06/25/18





  12:19 13:59 15:19


 


Temperature 36.4 C L  36.4 C L


 


Heart Rate 87 75 71


 


Respiratory 20 19 18





Rate   


 


Blood Pressure 98/60 104/55 L 103/59 L


 


O2 Saturation 95 96 96














  06/25/18





  16:03


 


Temperature 


 


Heart Rate 74


 


Respiratory 14





Rate 


 


Blood Pressure 


 


O2 Saturation 








 Oxygen











O2 Source []                   Room air


 


O2 Source                      Nasal cannula


 


Oxygen Flow Rate               2

















- EKG (time done)


  ** 1227


Rate: Rate (enter#) (82)


Ischemia: Q waves


Compare to prior EKG: Unchanged from prior EKG (6-17-18)


Computer interpretation: Agree with computer





- Labs


Labs: 


 Laboratory Tests











  06/25/18 06/25/18 06/25/18





  14:31 14:31 14:31


 


WBC  10.8  


 


RBC  4.05 L  


 


Hgb  12.2 L  


 


Hct  36.8 L  


 


MCV  90.7  


 


MCH  30.1  


 


MCHC  33.2  


 


RDW  15.1 H  


 


Plt Count  198  


 


MPV  9.1  


 


Neut # (Auto)  8.7 H  


 


Lymph # (Auto)  1.1 L  


 


Mono # (Auto)  0.9  


 


Eos # (Auto)  0.1  


 


Baso # (Auto)  0.0  


 


Absolute Nucleated RBC  0.00  


 


Nucleated RBC %  0.0  


 


Sodium   136 


 


Potassium   4.1 


 


Chloride   99 L 


 


Carbon Dioxide   31 


 


Anion Gap   6.0 


 


BUN   23 H 


 


Creatinine   1.2 


 


Estimated GFR (MDRD)   62 L 


 


Glucose   154 H 


 


Calcium   8.3 L 


 


Total Bilirubin   0.4 


 


AST   15 


 


ALT   19 


 


Alkaline Phosphatase   83 


 


Troponin I    < 0.04


 


B-Natriuretic Peptide   


 


Total Protein   6.2 L 


 


Albumin   3.3 


 


Globulin   2.9 


 


Albumin/Globulin Ratio   1.1 


 


Lipase   18 L 














  06/25/18





  14:31


 


WBC 


 


RBC 


 


Hgb 


 


Hct 


 


MCV 


 


MCH 


 


MCHC 


 


RDW 


 


Plt Count 


 


MPV 


 


Neut # (Auto) 


 


Lymph # (Auto) 


 


Mono # (Auto) 


 


Eos # (Auto) 


 


Baso # (Auto) 


 


Absolute Nucleated RBC 


 


Nucleated RBC % 


 


Sodium 


 


Potassium 


 


Chloride 


 


Carbon Dioxide 


 


Anion Gap 


 


BUN 


 


Creatinine 


 


Estimated GFR (MDRD) 


 


Glucose 


 


Calcium 


 


Total Bilirubin 


 


AST 


 


ALT 


 


Alkaline Phosphatase 


 


Troponin I 


 


B-Natriuretic Peptide  27


 


Total Protein 


 


Albumin 


 


Globulin 


 


Albumin/Globulin Ratio 


 


Lipase 














- Rads (name of study)


  ** 2 veiw chest


Radiology: Prelim report reviewed (Impression: Streaky left lung base opacities 

are likely atelectasis or scar.  Pneumonia is not entirely excluded.), EMP read 

indepedently, See rad report





Procedures





- IVC sono (time)


  ** 1345


Bedside IVC sono: IVC measures (cm) (2.25), IVC collapsed c insp (cm) (1.10), 

Euvolemia





PD MEDICAL DECISION MAKING





- ED course


Complexity details: reviewed results, re-evaluated patient, considered 

differential, d/w patient


ED course: 





58-year-old male with a history of congestive heart failure has increasing 

exertional dyspnea and he has no signs of congestive heart failure today on 

examination both physically and through the laboratory and chest x-ray.  He 

does have what sounds like rhonchi in the left base and this correlates with 

findings on his chest x-ray of infiltrate and he is treated for pneumonia.  He 

is administered Rocephin intravenously and a DuoNeb. He has marked improvement 

with the use of the duoneb and he is encouraged to use his rescue inhaler more 

frequently for these symptoms and to use it before his regular inhalers in the 

morning. 





- Sepsis Event


Vital Signs: 


 Vital Signs - 24 hr











  06/25/18 06/25/18 06/25/18





  12:19 13:59 15:19


 


Temperature 36.4 C L  36.4 C L


 


Heart Rate 87 75 71


 


Respiratory 20 19 18





Rate   


 


Blood Pressure 98/60 104/55 L 103/59 L


 


O2 Saturation 95 96 96














  06/25/18





  16:03


 


Temperature 


 


Heart Rate 74


 


Respiratory 14





Rate 


 


Blood Pressure 


 


O2 Saturation 








 Oxygen











O2 Source []                   Room air


 


O2 Source                      Nasal cannula


 


Oxygen Flow Rate               2

















Departure





- Departure


Disposition: 01 Home, Self Care


Clinical Impression: 


Pneumonia


Qualifiers:


 Pneumonia type: due to unspecified organism Laterality: left Lung location: 

lower lobe of lung Qualified Code(s): J18.1 - Lobar pneumonia, unspecified 

organism





Condition: Stable


Instructions:  ED Pneumonia Adult


Follow-Up: 


Leo Ray MD [Primary Care Provider] - 


Prescriptions: 


Azithromycin [Zithromax] 250 mg PO DAILY #6 tablet

## 2018-06-25 NOTE — XRAY REPORT
Procedure Date:  06/25/2018   

Accession Number:  [id number] / [id number]                    

Procedure:  XR  - Chest 2 View X-Ray CPT Code:  72172

 

FULL RESULT:

 

 

EXAM:

CHEST RADIOGRAPHY

 

EXAM DATE: 6/25/2018 02:23 PM.

 

CLINICAL HISTORY: Left lower rhonchi.

 

COMPARISON: 05/24/2018.

 

TECHNIQUE: 2 views.

 

FINDINGS:

Lungs/Pleura: No focal consolidation. There are streaky opacities in the 

left lung base, similar to before. No pleural effusion. No pneumothorax. 

Normal volumes.

 

Mediastinum: Heart and mediastinal contours are within normal limits. 

Right ventricular AICD.

 

Other: None.

IMPRESSION: Streaky left lung base opacities are most likely atelectasis 

or scar. Pneumonia is not entirely excluded.

 

[facility]

## 2018-07-02 ENCOUNTER — HOSPITAL ENCOUNTER (OUTPATIENT)
Dept: HOSPITAL 76 - EMS | Age: 58
Discharge: ANOTHER HOSPITAL | End: 2018-07-02
Attending: SURGERY
Payer: MEDICARE

## 2018-07-02 ENCOUNTER — HOSPITAL ENCOUNTER (EMERGENCY)
Dept: HOSPITAL 76 - ED | Age: 58
Discharge: HOME | End: 2018-07-02
Payer: MEDICARE

## 2018-07-02 VITALS — SYSTOLIC BLOOD PRESSURE: 101 MMHG | DIASTOLIC BLOOD PRESSURE: 72 MMHG

## 2018-07-02 DIAGNOSIS — I50.9: ICD-10-CM

## 2018-07-02 DIAGNOSIS — I25.2: ICD-10-CM

## 2018-07-02 DIAGNOSIS — E27.1: ICD-10-CM

## 2018-07-02 DIAGNOSIS — Z95.810: ICD-10-CM

## 2018-07-02 DIAGNOSIS — R07.9: ICD-10-CM

## 2018-07-02 DIAGNOSIS — E10.9: ICD-10-CM

## 2018-07-02 DIAGNOSIS — I95.9: ICD-10-CM

## 2018-07-02 DIAGNOSIS — R07.9: Primary | ICD-10-CM

## 2018-07-02 DIAGNOSIS — Z95.5: ICD-10-CM

## 2018-07-02 DIAGNOSIS — E78.00: ICD-10-CM

## 2018-07-02 DIAGNOSIS — I11.0: ICD-10-CM

## 2018-07-02 DIAGNOSIS — I25.10: ICD-10-CM

## 2018-07-02 DIAGNOSIS — R03.1: Primary | ICD-10-CM

## 2018-07-02 LAB
ALBUMIN DIAFP-MCNC: 3.8 G/DL (ref 3.2–5.5)
ALBUMIN/GLOB SERPL: 1.3 {RATIO} (ref 1–2.2)
ALP SERPL-CCNC: 79 IU/L (ref 42–121)
ALT SERPL W P-5'-P-CCNC: 21 IU/L (ref 10–60)
ANION GAP SERPL CALCULATED.4IONS-SCNC: 7 MMOL/L (ref 6–13)
AST SERPL W P-5'-P-CCNC: 12 IU/L (ref 10–42)
BASOPHILS NFR BLD AUTO: 0.1 10^3/UL (ref 0–0.1)
BASOPHILS NFR BLD AUTO: 0.7 %
BILIRUB BLD-MCNC: 0.7 MG/DL (ref 0.2–1)
BUN SERPL-MCNC: 26 MG/DL (ref 6–20)
CALCIUM UR-MCNC: 8.9 MG/DL (ref 8.5–10.3)
CHLORIDE SERPL-SCNC: 98 MMOL/L (ref 101–111)
CO2 SERPL-SCNC: 32 MMOL/L (ref 21–32)
CREAT SERPLBLD-SCNC: 1.2 MG/DL (ref 0.6–1.2)
EOSINOPHIL # BLD AUTO: 0.1 10^3/UL (ref 0–0.7)
EOSINOPHIL NFR BLD AUTO: 1 %
ERYTHROCYTE [DISTWIDTH] IN BLOOD BY AUTOMATED COUNT: 15.1 % (ref 12–15)
GFRSERPLBLD MDRD-ARVRAT: 62 ML/MIN/{1.73_M2} (ref 89–?)
GLOBULIN SER-MCNC: 3 G/DL (ref 2.1–4.2)
GLUCOSE SERPL-MCNC: 119 MG/DL (ref 70–100)
HGB UR QL STRIP: 12.9 G/DL (ref 14–18)
LIPASE SERPL-CCNC: 16 U/L (ref 22–51)
LYMPHOCYTES # SPEC AUTO: 1.3 10^3/UL (ref 1.5–3.5)
LYMPHOCYTES NFR BLD AUTO: 13.1 %
MCH RBC QN AUTO: 29.8 PG (ref 27–31)
MCHC RBC AUTO-ENTMCNC: 32.8 G/DL (ref 32–36)
MCV RBC AUTO: 90.9 FL (ref 80–94)
MONOCYTES # BLD AUTO: 0.9 10^3/UL (ref 0–1)
MONOCYTES NFR BLD AUTO: 9.3 %
NEUTROPHILS # BLD AUTO: 7.7 10^3/UL (ref 1.5–6.6)
NEUTROPHILS # SNV AUTO: 10.2 X10^3/UL (ref 4.8–10.8)
NEUTROPHILS NFR BLD AUTO: 75.9 %
PDW BLD AUTO: 8.4 FL (ref 7.4–11.4)
PLATELET # BLD: 223 10^3/UL (ref 130–450)
PROT SPEC-MCNC: 6.8 G/DL (ref 6.7–8.2)
RBC MAR: 4.33 10^6/UL (ref 4.7–6.1)
SODIUM SERPLBLD-SCNC: 137 MMOL/L (ref 135–145)

## 2018-07-02 PROCEDURE — 99283 EMERGENCY DEPT VISIT LOW MDM: CPT

## 2018-07-02 PROCEDURE — 80053 COMPREHEN METABOLIC PANEL: CPT

## 2018-07-02 PROCEDURE — 93005 ELECTROCARDIOGRAM TRACING: CPT

## 2018-07-02 PROCEDURE — 84484 ASSAY OF TROPONIN QUANT: CPT

## 2018-07-02 PROCEDURE — 85025 COMPLETE CBC W/AUTO DIFF WBC: CPT

## 2018-07-02 PROCEDURE — 71045 X-RAY EXAM CHEST 1 VIEW: CPT

## 2018-07-02 PROCEDURE — 36415 COLL VENOUS BLD VENIPUNCTURE: CPT

## 2018-07-02 PROCEDURE — 96374 THER/PROPH/DIAG INJ IV PUSH: CPT

## 2018-07-02 PROCEDURE — 99285 EMERGENCY DEPT VISIT HI MDM: CPT

## 2018-07-02 PROCEDURE — 83690 ASSAY OF LIPASE: CPT

## 2018-07-02 PROCEDURE — 96361 HYDRATE IV INFUSION ADD-ON: CPT

## 2018-07-02 NOTE — ED PHYSICIAN DOCUMENTATION
History of Present Illness





- Stated complaint


Stated Complaint: HYPOTENSION, CP





- Chief complaint


Chief Complaint: Cardiac





- History obtained from


History obtained from: Patient, EMS





- History of Present Illness


Timing: Today, How many hours ago (2)





- Additonal information


Additional information: 





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 

to the emergency department with intermittent aching chest pain over the past 2 

and half hours.  Last for a few seconds at a time.  He also noticed that his 

blood pressure was low which is not uncommon for him with his Hazen's 

disease. Given aspirin by EMS.  Currently pain-free.  States that his blood 

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 

Patient denies missing any doses of his medication.  Has a cardiac stress test 

scheduled for 5 July.  Nothing makes the symptoms better or worse as they only 

last for a few seconds at a time.





Review of Systems


Ten Systems: 10 systems reviewed and negative


Constitutional: denies: Fever, Chills


Ears: denies: Ear pain


Nose: denies: Rhinorrhea / runny nose, Congestion


Throat: denies: Sore throat


Respiratory: denies: Dyspnea, Cough, Wheezing


GI: denies: Abdominal Pain, Vomiting, Diarrhea


Skin: denies: Rash


Musculoskeletal: denies: Neck pain, Back pain


Neurologic: denies: Headache





PD PAST MEDICAL HISTORY





- Past Medical History


Cardiovascular: Congestive heart failure, Hypertension, High cholesterol, 

Coronary artery disease, MI, Other


Respiratory: COPD, Shortness of breath


Endocrine/Autoimmune: Type 1 diabetes, Other


GI: GERD, Chronic diarrhea, Other


: Benign prostate hypertrophy, Renal insuffiency, Other


HEENT: Other


Psych: Depression, Anxiety, Panic attacks, Post traumatic stress disorder


Musculoskeletal: Osteoarthritis


Derm: None





- Past Surgical History


Past Surgical History: Yes


General: Colonoscopy, EGD


Ortho: Carpal Tunnel surgery, Other


/GYN: Other


Cardiovascular: Coronary stent, AICD, Cardiac catheterization


HEENT: Other





- Present Medications


Home Medications: 


 Ambulatory Orders











 Medication  Instructions  Recorded  Confirmed


 


Pantoprazole [Protonix] 40 mg PO BIDAC 11/11/14 03/28/18


 


Spironolactone 12.5 mg PO DAILY 11/11/14 03/28/18


 


Zolpidem Tartrate 10 mg PO QPM PRN 11/11/14 03/28/18


 


raNITIdine HCl [Ranitidine HCl] 300 mg PO QPM 11/11/14 03/28/18


 


Insulin Lispro [Humalog] 1 - 11 units SQ .SLIDING SCALE 12/14/16 03/28/18


 


Pregabalin [Lyrica] 300 mg PO BID 12/14/16 03/28/18


 


Nortriptyline HCl 20 mg PO 0800,1700 05/08/17 03/28/18


 


Alprazolam 0.5 mg PO BID PRN 06/14/17 03/28/18


 


Cetirizine [ZyrTEC] 10 mg PO DAILY 06/14/17 03/28/18


 


Diclofenac Sodium [Voltaren] 4 gm TP QID PRN 06/14/17 03/28/18


 


Lidocaine Patch 5% [Lidoderm Patch] 1 each TOP DAILY PRN 06/14/17 03/28/18


 


Simvastatin 80 mg PO QPM 06/14/17 03/28/18


 


Budesonide [Entocort EC] 9 mg PO DAILY #60 capsule 06/17/17 03/28/18


 


Psyllium [Metamucil] 1 packet PO BID  packet 06/17/17 03/28/18


 


oxyCODONE [Roxicodone] 5 mg PO Q6HR #0 06/17/17 03/28/18


 


Aspirin Chewable [St Kwadwo 81 mg PO DAILY  tablet 08/06/17 03/28/18





Aspirin]   


 


DULoxetine [Cymbalta] 60 mg PO BID  capsule 08/06/17 03/28/18


 


Gabapentin [Neurontin] 300 mg PO BID  capsule 08/06/17 03/28/18


 


buPROPion [Wellbutrin Xl] 150 mg PO DAILY  tablet 08/06/17 11/25/17


 


Cholestyramine [Questran] 1 pkt PO TID 03/28/18 03/28/18


 


Furosemide 20 mg PO BID 03/28/18 03/28/18


 


Metoprolol Succinate/Hctz 1 each PO DAILY 03/28/18 03/28/18





[Metoprolol ER-Hctz 25-12.5 mg]   


 


Nitroglycerin [Nitrostat] 1 tab SL PRN PRN 03/28/18 03/28/18


 


Pramlintide Acetate [Symlinpen 60] 15 mcg SQ AC 03/28/18 03/28/18


 


Doxazosin [Cardura] 8 mg PO DAILY 06/17/18 06/17/18


 


Hydrocortisone [Cortef]  06/17/18 


 


Prasugrel HCl 10 mg PO 06/17/18 


 


Azithromycin [Zithromax] 250 mg PO DAILY #6 tablet 06/25/18 














- Allergies


Allergies/Adverse Reactions: 


 Allergies











Allergy/AdvReac Type Severity Reaction Status Date / Time


 


No Known Drug Allergies Allergy   Verified 03/28/18 18:47














- Social History


Does the pt smoke?: No


Smoking Status: Never smoker


Does the pt drink ETOH?: No


Does the pt have substance abuse?: No





- Immunizations


Immunizations are current?: Yes





- POLST


Patient has POLST: No





PD ED PE NORMAL





- Vitals


Vital signs reviewed: Yes





- General


General: Alert and oriented X 3, No acute distress, Well developed/nourished





- HEENT


HEENT: PERRL, Moist mucous membranes





- Neck


Neck: Supple, no meningeal sign





- Cardiac


Cardiac: RRR, Strong equal pulses





- Respiratory


Respiratory: No respiratory distress, Clear bilaterally





- Abdomen


Abdomen: Soft, Non tender, Non distended





- Back


Back: No spinal TTP





- Derm


Derm: Warm and dry, No rash





- Extremities


Extremities: No edema





- Neuro


Neuro: Alert and oriented X 3





- Psych


Psych: Normal mood, Normal affect





Results





- Vitals


Vitals: 


 Vital Signs - 24 hr











  07/02/18 07/02/18 07/02/18





  19:03 19:32 20:12


 


Temperature 36.3 C L  


 


Heart Rate 77 74 70


 


Respiratory 13 16 16





Rate   


 


Blood Pressure 98/62 95/67 101/72


 


O2 Saturation 98 94 95














  07/02/18 07/02/18





  22:32 23:26


 


Temperature  


 


Heart Rate 70 70


 


Respiratory 16 16





Rate  


 


Blood Pressure 103/68 101/72


 


O2 Saturation 96 97








 Oxygen











O2 Source [With Activity]      Room air


 


O2 Source                      Room air

















- EKG (time done)


  ** 1912


Rate: Rate (enter#) (73)


Rhythm: NSR


Axis: Normal


Intervals: Normal MS


QRS: Normal


Ischemia: Q waves (V1-4)


Compare to prior EKG: Unchanged from prior EKG





  ** 2206


Rate: Rate (enter#) (70)


Rhythm: NSR


Axis: Normal


Intervals: Normal MS


QRS: Normal


Ischemia: Normal ST segments, Q waves (v1-4)


Compare to prior EKG: Unchanged from prior EKG





- Labs


Labs: 


 Laboratory Tests











  07/02/18 07/02/18 07/02/18





  19:23 19:23 19:23


 


WBC  10.2  


 


RBC  4.33 L  


 


Hgb  12.9 L  


 


Hct  39.3 L  


 


MCV  90.9  


 


MCH  29.8  


 


MCHC  32.8  


 


RDW  15.1 H  


 


Plt Count  223  


 


MPV  8.4  


 


Neut # (Auto)  7.7 H  


 


Lymph # (Auto)  1.3 L  


 


Mono # (Auto)  0.9  


 


Eos # (Auto)  0.1  


 


Baso # (Auto)  0.1  


 


Absolute Nucleated RBC  0.00  


 


Nucleated RBC %  0.0  


 


Sodium   137 


 


Potassium   4.5 


 


Chloride   98 L 


 


Carbon Dioxide   32 


 


Anion Gap   7.0 


 


BUN   26 H 


 


Creatinine   1.2 


 


Estimated GFR (MDRD)   62 L 


 


Glucose   119 H 


 


Calcium   8.9 


 


Total Bilirubin   0.7 


 


AST   12 


 


ALT   21 


 


Alkaline Phosphatase   79 


 


Troponin I    < 0.04


 


Total Protein   6.8 


 


Albumin   3.8 


 


Globulin   3.0 


 


Albumin/Globulin Ratio   1.3 


 


Lipase   16 L 














  07/02/18





  22:01


 


WBC 


 


RBC 


 


Hgb 


 


Hct 


 


MCV 


 


MCH 


 


MCHC 


 


RDW 


 


Plt Count 


 


MPV 


 


Neut # (Auto) 


 


Lymph # (Auto) 


 


Mono # (Auto) 


 


Eos # (Auto) 


 


Baso # (Auto) 


 


Absolute Nucleated RBC 


 


Nucleated RBC % 


 


Sodium 


 


Potassium 


 


Chloride 


 


Carbon Dioxide 


 


Anion Gap 


 


BUN 


 


Creatinine 


 


Estimated GFR (MDRD) 


 


Glucose 


 


Calcium 


 


Total Bilirubin 


 


AST 


 


ALT 


 


Alkaline Phosphatase 


 


Troponin I  < 0.04


 


Total Protein 


 


Albumin 


 


Globulin 


 


Albumin/Globulin Ratio 


 


Lipase 














- Rads (name of study)


  ** cxr


Radiology: Prelim report reviewed, EMP read contemporaneously, See rad report (

Stable chest.  Vascular congestion.  Subsegmental atelectasis at the left lung 

base.)





PD MEDICAL DECISION MAKING





- ED course


Complexity details: reviewed results, re-evaluated patient, considered 

differential (No ST elevation MI, no aortic dissection, no PE, no tension 

pneumothorax, no aortic aneurysm), d/w patient


ED course: 





Patient is a 58-year-old gentleman who presents to the emergency department 

with atypical chest pain today as well as hypertension.  Given hydrocortisone 

IV and IV fluids.   No recurrent chest pain in the emergency department.  

Negative troponin 2.  Normal EKG 2.  He feels much better after fluids and 

hydrocortisone.  We will have him follow-up with his doctor for further care 

including his stress test later this week.  He will return if he worsens.  

Patient counseled regarding signs and symptoms for which I believe and urgent re

-evaluation would be necessary. Patient with good understanding of and 

agreement to plan and is comfortable going home at this time





This document was made in part using voice recognition software. While efforts 

are made to proofread this document, sound alike and grammatical errors may 

occur.





- Sepsis Event


Vital Signs: 


 Vital Signs - 24 hr











  07/02/18 07/02/18 07/02/18





  19:03 19:32 20:12


 


Temperature 36.3 C L  


 


Heart Rate 77 74 70


 


Respiratory 13 16 16





Rate   


 


Blood Pressure 98/62 95/67 101/72


 


O2 Saturation 98 94 95














  07/02/18 07/02/18





  22:32 23:26


 


Temperature  


 


Heart Rate 70 70


 


Respiratory 16 16





Rate  


 


Blood Pressure 103/68 101/72


 


O2 Saturation 96 97








 Oxygen











O2 Source [With Activity]      Room air


 


O2 Source                      Room air

















Departure





- Departure


Disposition: 01 Home, Self Care


Clinical Impression: 


Chest pain


Qualifiers:


 Chest pain type: unspecified Qualified Code(s): R07.9 - Chest pain, unspecified





Hypotension


Qualifiers:


 Hypotension type: unspecified hypotension type Qualified Code(s): I95.9 - 

Hypotension, unspecified





Condition: Good


Instructions:  ED Chest Pain Atypical Unkn Cause


Follow-Up: 


your,doctor in 3 days. [Other]


Comments: 


Return if you worsen. Drink plenty of fluids and rest.  Make sure to follow up 

with your cardiologist for your stress test on the 5th of July


Discharge Date/Time: 07/02/18 23:26

## 2018-07-02 NOTE — XRAY REPORT
Procedure Date:  07/02/2018   

Accession Number:  [id number] / [id number]                    

Procedure:  XR  - Chest 1 View X-Ray CPT Code:  46625

 

FULL RESULT:

 

 

EXAM:

CHEST RADIOGRAPHY

 

EXAM DATE: 7/2/2018 08:26 PM.

 

CLINICAL HISTORY: Chest pain.

 

COMPARISON: Chest radiograph 06/25/2018.

 

TECHNIQUE: 1 view.

 

FINDINGS:

Lungs/Pleura: Vascular congestion. Small left lung base opacity.

 

Mediastinum: Stable

 

Other: Left sided defibrillator with a right ventricular lead.

 

IMPRESSION:

1. Stable chest

2. Vascular congestion

3. Subsegmental atelectasis at the left lung base

 

[facility]

## 2018-07-14 ENCOUNTER — HOSPITAL ENCOUNTER (EMERGENCY)
Dept: HOSPITAL 76 - ED | Age: 58
Discharge: HOME | End: 2018-07-14
Payer: MEDICARE

## 2018-07-14 VITALS — SYSTOLIC BLOOD PRESSURE: 128 MMHG | DIASTOLIC BLOOD PRESSURE: 106 MMHG

## 2018-07-14 DIAGNOSIS — E10.9: ICD-10-CM

## 2018-07-14 DIAGNOSIS — I50.9: ICD-10-CM
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
## 2018-07-14 DIAGNOSIS — S51.012A: Primary | ICD-10-CM

## 2018-07-14 DIAGNOSIS — R79.89: ICD-10-CM

## 2018-07-14 DIAGNOSIS — I95.9: ICD-10-CM

## 2018-07-14 DIAGNOSIS — Z79.82: ICD-10-CM

## 2018-07-14 DIAGNOSIS — I11.0: ICD-10-CM
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
## 2018-07-14 DIAGNOSIS — Z79.4: ICD-10-CM

## 2018-07-14 LAB
ANION GAP SERPL CALCULATED.4IONS-SCNC: 10 MMOL/L (ref 6–13)
ANION GAP SERPL CALCULATED.4IONS-SCNC: 8 MMOL/L (ref 6–13)
BASOPHILS NFR BLD AUTO: 0.1 10^3/UL (ref 0–0.1)
BASOPHILS NFR BLD AUTO: 0.6 %
BUN SERPL-MCNC: 34 MG/DL (ref 6–20)
BUN SERPL-MCNC: 36 MG/DL (ref 6–20)
CALCIUM UR-MCNC: 8.3 MG/DL (ref 8.5–10.3)
CALCIUM UR-MCNC: 8.8 MG/DL (ref 8.5–10.3)
CHLORIDE SERPL-SCNC: 101 MMOL/L (ref 101–111)
CHLORIDE SERPL-SCNC: 96 MMOL/L (ref 101–111)
CO2 SERPL-SCNC: 25 MMOL/L (ref 21–32)
CO2 SERPL-SCNC: 27 MMOL/L (ref 21–32)
CREAT SERPLBLD-SCNC: 2.3 MG/DL (ref 0.6–1.2)
CREAT SERPLBLD-SCNC: 3.1 MG/DL (ref 0.6–1.2)
EOSINOPHIL # BLD AUTO: 0.1 10^3/UL (ref 0–0.7)
EOSINOPHIL NFR BLD AUTO: 1.4 %
ERYTHROCYTE [DISTWIDTH] IN BLOOD BY AUTOMATED COUNT: 14.8 % (ref 12–15)
GFRSERPLBLD MDRD-ARVRAT: 21 ML/MIN/{1.73_M2} (ref 89–?)
GFRSERPLBLD MDRD-ARVRAT: 29 ML/MIN/{1.73_M2} (ref 89–?)
GLUCOSE SERPL-MCNC: 126 MG/DL (ref 70–100)
GLUCOSE SERPL-MCNC: 141 MG/DL (ref 70–100)
HGB UR QL STRIP: 12.5 G/DL (ref 14–18)
LYMPHOCYTES # SPEC AUTO: 1.8 10^3/UL (ref 1.5–3.5)
LYMPHOCYTES NFR BLD AUTO: 16.5 %
MCH RBC QN AUTO: 30.2 PG (ref 27–31)
MCHC RBC AUTO-ENTMCNC: 33.1 G/DL (ref 32–36)
MCV RBC AUTO: 91.1 FL (ref 80–94)
MONOCYTES # BLD AUTO: 1.1 10^3/UL (ref 0–1)
MONOCYTES NFR BLD AUTO: 10.5 %
NEUTROPHILS # BLD AUTO: 7.5 10^3/UL (ref 1.5–6.6)
NEUTROPHILS # SNV AUTO: 10.6 X10^3/UL (ref 4.8–10.8)
NEUTROPHILS NFR BLD AUTO: 71 %
PDW BLD AUTO: 9.1 FL (ref 7.4–11.4)
PLATELET # BLD: 224 10^3/UL (ref 130–450)
RBC MAR: 4.14 10^6/UL (ref 4.7–6.1)
SODIUM SERPLBLD-SCNC: 133 MMOL/L (ref 135–145)
SODIUM SERPLBLD-SCNC: 134 MMOL/L (ref 135–145)

## 2018-07-14 PROCEDURE — 96361 HYDRATE IV INFUSION ADD-ON: CPT

## 2018-07-14 PROCEDURE — 99284 EMERGENCY DEPT VISIT MOD MDM: CPT

## 2018-07-14 PROCEDURE — 96374 THER/PROPH/DIAG INJ IV PUSH: CPT

## 2018-07-14 PROCEDURE — 85025 COMPLETE CBC W/AUTO DIFF WBC: CPT

## 2018-07-14 PROCEDURE — 36415 COLL VENOUS BLD VENIPUNCTURE: CPT

## 2018-07-14 PROCEDURE — 93005 ELECTROCARDIOGRAM TRACING: CPT

## 2018-07-14 PROCEDURE — 84484 ASSAY OF TROPONIN QUANT: CPT

## 2018-07-14 PROCEDURE — 80048 BASIC METABOLIC PNL TOTAL CA: CPT

## 2018-07-14 NOTE — ED PHYSICIAN DOCUMENTATION
History of Present Illness





- Stated complaint


Stated Complaint: GLF/ LF HIP/ELBOW PX





- Chief complaint


Chief Complaint: Trauma Ext





- History obtained from


History obtained from: Patient





- History of Present Illness


Timing: Today, How many hours ago (1)


Pain level now: 2


Improved by: rest


Worsened by: movement (left elbow), standing (lightheadedness)





- Additonal information


Additional information: 





was at home preparing food in kitchen when he fell to ground; he says he thinks 

he tripped. low BP noted during triage process. similar episodes in the past 

including earlier this month, possibly related to Addisons disease. takes 

daily PO hydrocortisone





Review of Systems


Constitutional: reports: Reviewed and negative


Eyes: reports: Reviewed and negative


Cardiac: reports: Reviewed and negative


Respiratory: reports: Reviewed and negative


GI: reports: Reviewed and negative


: denies: Dysuria, Frequency


Musculoskeletal: reports: Joint pain (left elbow)


Neurologic: reports: Reviewed and negative





PD PAST MEDICAL HISTORY





- Past Medical History


Cardiovascular: Congestive heart failure, Hypertension, High cholesterol, 

Coronary artery disease, MI, Other


Respiratory: COPD, Shortness of breath


Endocrine/Autoimmune: Type 1 diabetes, Other


GI: GERD, Chronic diarrhea, Other


: Benign prostate hypertrophy, Renal insuffiency, Other


HEENT: Other


Psych: Depression, Anxiety, Panic attacks, Post traumatic stress disorder


Musculoskeletal: Osteoarthritis


Derm: None





- Past Surgical History


Past Surgical History: Yes


General: Colonoscopy, EGD


Ortho: Carpal Tunnel surgery, Other


/GYN: Other


Cardiovascular: Coronary stent, AICD, Cardiac catheterization


HEENT: Other





- Present Medications


Home Medications: 


 Ambulatory Orders











 Medication  Instructions  Recorded  Confirmed


 


Pantoprazole [Protonix] 40 mg PO BIDAC 11/11/14 03/28/18


 


Spironolactone 12.5 mg PO DAILY 11/11/14 03/28/18


 


Zolpidem Tartrate 10 mg PO QPM PRN 11/11/14 03/28/18


 


raNITIdine HCl [Ranitidine HCl] 300 mg PO QPM 11/11/14 03/28/18


 


Insulin Lispro [Humalog] 1 - 11 units SQ .SLIDING SCALE 12/14/16 03/28/18


 


Pregabalin [Lyrica] 300 mg PO BID 12/14/16 03/28/18


 


Nortriptyline HCl 20 mg PO 0800,1700 05/08/17 03/28/18


 


Alprazolam 0.5 mg PO BID PRN 06/14/17 03/28/18


 


Cetirizine [ZyrTEC] 10 mg PO DAILY 06/14/17 03/28/18


 


Diclofenac Sodium [Voltaren] 4 gm TP QID PRN 06/14/17 03/28/18


 


Lidocaine Patch 5% [Lidoderm Patch] 1 each TOP DAILY PRN 06/14/17 03/28/18


 


Simvastatin 80 mg PO QPM 06/14/17 03/28/18


 


Budesonide [Entocort EC] 9 mg PO DAILY #60 capsule 06/17/17 03/28/18


 


Psyllium [Metamucil] 1 packet PO BID  packet 06/17/17 03/28/18


 


oxyCODONE [Roxicodone] 5 mg PO Q6HR #0 06/17/17 03/28/18


 


Aspirin Chewable [St Kwadwo 81 mg PO DAILY  tablet 08/06/17 03/28/18





Aspirin]   


 


DULoxetine [Cymbalta] 60 mg PO BID  capsule 08/06/17 03/28/18


 


Gabapentin [Neurontin] 300 mg PO BID  capsule 08/06/17 03/28/18


 


buPROPion [Wellbutrin Xl] 150 mg PO DAILY  tablet 08/06/17 11/25/17


 


Cholestyramine [Questran] 1 pkt PO TID 03/28/18 03/28/18


 


Furosemide 20 mg PO BID 03/28/18 03/28/18


 


Metoprolol Succinate/Hctz 1 each PO DAILY 03/28/18 03/28/18





[Metoprolol ER-Hctz 25-12.5 mg]   


 


Nitroglycerin [Nitrostat] 1 tab SL PRN PRN 03/28/18 03/28/18


 


Pramlintide Acetate [Symlinpen 60] 15 mcg SQ AC 03/28/18 03/28/18


 


Doxazosin [Cardura] 8 mg PO DAILY 06/17/18 06/17/18


 


Hydrocortisone [Cortef]  06/17/18 


 


Prasugrel HCl 10 mg PO 06/17/18 


 


Azithromycin [Zithromax] 250 mg PO DAILY #6 tablet 06/25/18 














- Allergies


Allergies/Adverse Reactions: 


 Allergies











Allergy/AdvReac Type Severity Reaction Status Date / Time


 


No Known Drug Allergies Allergy   Verified 07/14/18 03:43














- Social History


Does the pt smoke?: No


Smoking Status: Never smoker


Does the pt drink ETOH?: No


Does the pt have substance abuse?: No





- Immunizations


Immunizations are current?: Yes





- POLST


Patient has POLST: No





PD ED PE NORMAL





- Vitals


Vital signs reviewed: Yes





- General


General: Alert and oriented X 3, No acute distress, Well developed/nourished





- HEENT


HEENT: Moist mucous membranes





- Neck


Neck: Supple, no meningeal sign, No bony TTP





- Cardiac


Cardiac: RRR, No murmur





- Respiratory


Respiratory: No respiratory distress





- Abdomen


Abdomen: Soft, Non tender





- Derm


Derm: Normal color, Warm and dry, No rash





- Extremities


Extremities: No edema, Other (skin tear, left elbow. full ROM and no bony 

tenderness)





- Neuro


Neuro: Alert and oriented X 3, CNs 2-12 intact, No motor deficit, No sensory 

deficit, Normal speech


Eye Opening: Spontaneous


Motor: Obeys Commands


Verbal: Oriented


GCS Score: 15





Results





- Vitals


Vitals: 


 Oxygen











O2 Source [With Activity]      Room air


 


O2 Source                      Room air

















- EKG (time done)


  ** No standard instances


Rate: Rate (enter#) (70)


Rhythm: NSR


Axis: Normal


Intervals: Normal NH


QRS: Normal


Ischemia: Normal ST segments, Q waves (V1-V4)


Compare to prior EKG: Unchanged from prior EKG





- Labs


Labs: 


 Laboratory Tests











  07/14/18 07/14/18 07/14/18





  03:45 03:45 03:45


 


WBC  10.6  


 


RBC  4.14 L  


 


Hgb  12.5 L  


 


Hct  37.7 L  


 


MCV  91.1  


 


MCH  30.2  


 


MCHC  33.1  


 


RDW  14.8  


 


Plt Count  224  


 


MPV  9.1  


 


Neut # (Auto)  7.5 H  


 


Lymph # (Auto)  1.8  


 


Mono # (Auto)  1.1 H  


 


Eos # (Auto)  0.1  


 


Baso # (Auto)  0.1  


 


Absolute Nucleated RBC  0.00  


 


Nucleated RBC %  0.0  


 


Sodium   133 L 


 


Potassium   3.8 


 


Chloride   96 L 


 


Carbon Dioxide   27 


 


Anion Gap   10.0 


 


BUN   34 H 


 


Creatinine   3.1 H 


 


Estimated GFR (MDRD)   21 L 


 


Glucose   126 H 


 


Calcium   8.8 


 


Troponin I    < 0.04














  07/14/18





  07:25


 


WBC 


 


RBC 


 


Hgb 


 


Hct 


 


MCV 


 


MCH 


 


MCHC 


 


RDW 


 


Plt Count 


 


MPV 


 


Neut # (Auto) 


 


Lymph # (Auto) 


 


Mono # (Auto) 


 


Eos # (Auto) 


 


Baso # (Auto) 


 


Absolute Nucleated RBC 


 


Nucleated RBC % 


 


Sodium  134 L


 


Potassium  4.3


 


Chloride  101


 


Carbon Dioxide  25


 


Anion Gap  8.0


 


BUN  36 H


 


Creatinine  2.3 H


 


Estimated GFR (MDRD)  29 L


 


Glucose  141 H


 


Calcium  8.3 L


 


Troponin I 














PD MEDICAL DECISION MAKING





- ED course


Complexity details: reviewed old records, reviewed results, re-evaluated patient

, considered differential, d/w patient


ED course: 





hypotensive on presentation (70s-80s SBP), improved eith IV fluids and IV 

hydrocortisone. asymptomatic at discharge including when standing (orthostatics 

also checked and WNL)





- Sepsis Event


Vital Signs: 


 Oxygen











O2 Source [With Activity]      Room air


 


O2 Source                      Room air

















Departure





- Departure


Disposition: 01 Home, Self Care


Clinical Impression: 


 Abnormal serum creatinine level





Fall


Qualifiers:


 Encounter type: initial encounter Qualified Code(s): W19.XXXA - Unspecified 

fall, initial encounter





Hypotension


Qualifiers:


 Hypotension type: unspecified hypotension type Qualified Code(s): I95.9 - 

Hypotension, unspecified





Condition: Good


Instructions:  ED Hypotension All Causes


Follow-Up: 


Leo Ray MD [Primary Care Provider] - Within 3 Days


Comments: 


As discussed, your kidney tests (as measured by blood tests; specifically, BUN 

and creatinine) are abnormal. While these tests did improve significantly with 

intravenous fluids, they remain abnormal at the time of discharge from the 

emergency department. You need to follow up with your primary care physician 

within the next few days, as they might want to repeat these tests. It is 

important that you stay hydrated, as dehydration will likely cause the kidney 

tests to worsen.


Discharge Date/Time: 07/14/18 09:14

## 2018-08-01 ENCOUNTER — HOSPITAL ENCOUNTER (EMERGENCY)
Dept: HOSPITAL 76 - ED | Age: 58
Discharge: HOME | End: 2018-08-01
Payer: MEDICARE

## 2018-08-01 VITALS — DIASTOLIC BLOOD PRESSURE: 81 MMHG | SYSTOLIC BLOOD PRESSURE: 119 MMHG

## 2018-08-01 DIAGNOSIS — E10.9: ICD-10-CM

## 2018-08-01 DIAGNOSIS — I11.0: ICD-10-CM

## 2018-08-01 DIAGNOSIS — S09.90XA: ICD-10-CM
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
## 2018-08-01 DIAGNOSIS — S01.311A: Primary | ICD-10-CM

## 2018-08-01 DIAGNOSIS — Z95.810: ICD-10-CM

## 2018-08-01 DIAGNOSIS — I25.2: ICD-10-CM

## 2018-08-01 DIAGNOSIS — E78.00: ICD-10-CM

## 2018-08-01 DIAGNOSIS — I50.9: ICD-10-CM

## 2018-08-01 DIAGNOSIS — I25.10: ICD-10-CM

## 2018-08-01 DIAGNOSIS — Z95.5: ICD-10-CM

## 2018-08-01 DIAGNOSIS — Z79.82: ICD-10-CM

## 2018-08-01 LAB
BASOPHILS NFR BLD AUTO: 0 10^3/UL (ref 0–0.1)
BASOPHILS NFR BLD AUTO: 0.5 %
EOSINOPHIL # BLD AUTO: 0.5 10^3/UL (ref 0–0.7)
EOSINOPHIL NFR BLD AUTO: 5.3 %
ERYTHROCYTE [DISTWIDTH] IN BLOOD BY AUTOMATED COUNT: 14.1 % (ref 12–15)
HGB UR QL STRIP: 13 G/DL (ref 14–18)
INR PPP: 1 (ref 0.8–1.2)
LYMPHOCYTES # SPEC AUTO: 1.8 10^3/UL (ref 1.5–3.5)
LYMPHOCYTES NFR BLD AUTO: 19.9 %
MCH RBC QN AUTO: 30.5 PG (ref 27–31)
MCHC RBC AUTO-ENTMCNC: 34 G/DL (ref 32–36)
MCV RBC AUTO: 89.7 FL (ref 80–94)
MONOCYTES # BLD AUTO: 0.8 10^3/UL (ref 0–1)
MONOCYTES NFR BLD AUTO: 9.5 %
NEUTROPHILS # BLD AUTO: 5.7 10^3/UL (ref 1.5–6.6)
NEUTROPHILS # SNV AUTO: 8.9 X10^3/UL (ref 4.8–10.8)
NEUTROPHILS NFR BLD AUTO: 64.8 %
PDW BLD AUTO: 8.4 FL (ref 7.4–11.4)
PLATELET # BLD: 230 10^3/UL (ref 130–450)
PROTHROM ACT/NOR PPP: 11.5 SECS (ref 9.9–12.6)
RBC MAR: 4.27 10^6/UL (ref 4.7–6.1)

## 2018-08-01 PROCEDURE — 70450 CT HEAD/BRAIN W/O DYE: CPT

## 2018-08-01 PROCEDURE — 85025 COMPLETE CBC W/AUTO DIFF WBC: CPT

## 2018-08-01 PROCEDURE — 99283 EMERGENCY DEPT VISIT LOW MDM: CPT

## 2018-08-01 PROCEDURE — 72125 CT NECK SPINE W/O DYE: CPT

## 2018-08-01 PROCEDURE — 12053 INTMD RPR FACE/MM 5.1-7.5 CM: CPT

## 2018-08-01 PROCEDURE — 85610 PROTHROMBIN TIME: CPT

## 2018-08-01 PROCEDURE — 36415 COLL VENOUS BLD VENIPUNCTURE: CPT

## 2018-08-01 NOTE — CT REPORT
Procedure Date:  08/01/2018   

Accession Number:  [id number] / [id number]                    

Procedure:  CT  - Cervical Spine W/O CPT Code:  

 

FULL RESULT:

 

 

EXAM:

CT CERVICAL SPINE WITHOUT CONTRAST

 

DATE: 8/1/2018 10:02 AM.

 

HISTORY: Fall HI neck pain.

 

COMPARISONS: None.

 

TECHNIQUE: Thin-section axial images were acquired of the cervical spine 

without contrast. Post-processing: Coronal and sagittal reformats. Other: 

None.

 

In accordance with CT protocol optimization, one or more of the following 

dose reduction techniques were utilized for this exam: automated exposure 

control, adjustment of mA and/or KV based on patient size, or use of 

iterative reconstructive technique.

 

FINDINGS:

Alignment: No scoliosis or spondylolisthesis. Cervical spine 

straightening.

 

Bones: No fracture or bone lesion. Mild spondylosis with marginal 

osteophytosis at C6/C7 and C7/T1.

 

Interspace Levels/Facets: Preserved disk heights.

 

Musculature: Normal. No fatty atrophy.

 

Other: The paravertebral and prevertebral soft tissues are unremarkable. 

Left-sided pacemaker

IMPRESSION: No fracture or spondylolisthesis.

 

[facility]

## 2018-08-01 NOTE — ED PHYSICIAN DOCUMENTATION
History of Present Illness





- Stated complaint


Stated Complaint: EAR/HEAD LAC





- Chief complaint


Chief Complaint: General





- Additonal information


Additional information: 





hx from pt


58 male


cardiac hx but not on blood thinners


but has been bruising easily recently


fell out of bed


struck head


lac ant to R ear


no LOC


has HA and neck pain


denies numbness and weakness


no recent fever cough NVD CP AP etc


tdap UTD





Review of Systems


Constitutional: denies: Fever, Chills


Ears: denies: Ear pain, Drainage/discharge


Nose: denies: Epistaxis


Cardiac: denies: Chest pain / pressure, Palpitations


Respiratory: denies: Dyspnea, Cough


GI: denies: Abdominal Pain, Nausea, Vomiting


Skin: reports: Laceration (s)


Musculoskeletal: reports: Neck pain


Neurologic: reports: Headache, Head injury.  denies: Focal weakness, Numbness, 

Syncope, Seizure


Endocrine: reports: Easy bruising / bleeding


Immunocompromised: denies: Immunocompromised





PD PAST MEDICAL HISTORY





- Past Medical History


Past Medical History: Yes


Cardiovascular: Congestive heart failure, Hypertension, High cholesterol, 

Coronary artery disease, MI, Other


Respiratory: COPD, Shortness of breath


Neuro: None


Endocrine/Autoimmune: Type 1 diabetes, Other


GI: GERD, Chronic diarrhea, Other


: Benign prostate hypertrophy, Renal insuffiency, Other


HEENT: Other


Psych: Depression, Anxiety, Panic attacks, Post traumatic stress disorder


Musculoskeletal: Osteoarthritis


Derm: None





- Past Surgical History


Past Surgical History: Yes


General: Colonoscopy, EGD


Ortho: Carpal Tunnel surgery, Other


/GYN: Other


Cardiovascular: Coronary stent, AICD, Cardiac catheterization


HEENT: Other





- Present Medications


Home Medications: 


 Ambulatory Orders











 Medication  Instructions  Recorded  Confirmed


 


Pantoprazole [Protonix] 40 mg PO BIDAC 11/11/14 03/28/18


 


Spironolactone 12.5 mg PO DAILY 11/11/14 03/28/18


 


Zolpidem Tartrate 10 mg PO QPM PRN 11/11/14 03/28/18


 


raNITIdine HCl [Ranitidine HCl] 300 mg PO QPM 11/11/14 03/28/18


 


Insulin Lispro [Humalog] 1 - 11 units SQ .SLIDING SCALE 12/14/16 03/28/18


 


Pregabalin [Lyrica] 300 mg PO BID 12/14/16 03/28/18


 


Nortriptyline HCl 20 mg PO 0800,1700 05/08/17 03/28/18


 


Alprazolam 0.5 mg PO BID PRN 06/14/17 03/28/18


 


Cetirizine [ZyrTEC] 10 mg PO DAILY 06/14/17 03/28/18


 


Diclofenac Sodium [Voltaren] 4 gm TP QID PRN 06/14/17 03/28/18


 


Lidocaine Patch 5% [Lidoderm Patch] 1 each TOP DAILY PRN 06/14/17 03/28/18


 


Simvastatin 80 mg PO QPM 06/14/17 03/28/18


 


Budesonide [Entocort EC] 9 mg PO DAILY #60 capsule 06/17/17 03/28/18


 


Psyllium [Metamucil] 1 packet PO BID  packet 06/17/17 03/28/18


 


oxyCODONE [Roxicodone] 5 mg PO Q6HR #0 06/17/17 03/28/18


 


Aspirin Chewable [St Kw[place]wo 81 mg PO DAILY  tablet 08/06/17 03/28/18





Aspirin]   


 


DULoxetine [Cymbalta] 60 mg PO BID  capsule 08/06/17 03/28/18


 


Gabapentin [Neurontin] 300 mg PO BID  capsule 08/06/17 03/28/18


 


buPROPion [Wellbutrin Xl] 150 mg PO DAILY  tablet 08/06/17 11/25/17


 


Cholestyramine [Questran] 1 pkt PO TID 03/28/18 03/28/18


 


Furosemide 20 mg PO BID 03/28/18 03/28/18


 


Metoprolol Succinate/Hctz 1 each PO DAILY 03/28/18 03/28/18





[Metoprolol ER-Hctz 25-12.5 mg]   


 


Nitroglycerin [Nitrostat] 1 tab SL PRN PRN 03/28/18 03/28/18


 


Pramlintide Acetate [Symlinpen 60] 15 mcg SQ AC 03/28/18 03/28/18


 


Doxazosin [Cardura] 8 mg PO DAILY 06/17/18 06/17/18


 


Hydrocortisone [Cortef]  06/17/18 


 


Prasugrel HCl 10 mg PO 06/17/18 


 


Azithromycin [Zithromax] 250 mg PO DAILY #6 tablet 06/25/18 


 


Isosorbide Mononitrate [Isosorbide 30 mg PO 08/01/18 08/01/18





Mononitrate ER]   














- Allergies


Allergies/Adverse Reactions: 


 Allergies











Allergy/AdvReac Type Severity Reaction Status Date / Time


 


No Known Drug Allergies Allergy   Verified 08/01/18 08:36














- Social History


Does the pt smoke?: No


Smoking Status: Never smoker


Does the pt drink ETOH?: No


Does the pt have substance abuse?: No





- Immunizations


Immunizations are current?: Yes





- POLST


Patient has POLST: No





PD ED PE NORMAL





- Vitals


Vital signs reviewed: Yes





- General


General: Alert and oriented X 3





- HEENT


HEENT: PERRL, EOMI.  No: Atraumatic (approx 6/5 cm stellate lac ant to R ear)





- Neck


Neck: No: No bony TTP (+ TTP will collar and image)





- Cardiac


Cardiac: RRR





- Respiratory


Respiratory: No respiratory distress, Clear bilaterally





- Abdomen


Abdomen: Non tender





- Derm


Derm: Normal color





- Neuro


Neuro: Alert and oriented X 3, No motor deficit, No sensory deficit


Eye Opening: Spontaneous


Motor: Obeys Commands


Verbal: Oriented


GCS Score: 15





Results





- Vitals


Vitals: 


 Vital Signs - 24 hr











  08/01/18 08/01/18





  08:33 10:15


 


Temperature 36.5 C 


 


Heart Rate 84 72


 


Respiratory 16 16





Rate  


 


Blood Pressure 113/84 H 123/71


 


O2 Saturation 96 98








 Oxygen











O2 Source []                   Room air


 


O2 Source                      Room air

















- Labs


Labs: 


 Laboratory Tests











  08/01/18 08/01/18





  08:50 08:50


 


WBC  8.9 


 


RBC  4.27 L 


 


Hgb  13.0 L 


 


Hct  38.3 L 


 


MCV  89.7 


 


MCH  30.5 


 


MCHC  34.0 


 


RDW  14.1 


 


Plt Count  230 


 


MPV  8.4 


 


Neut # (Auto)  5.7 


 


Lymph # (Auto)  1.8 


 


Mono # (Auto)  0.8 


 


Eos # (Auto)  0.5 


 


Baso # (Auto)  0.0 


 


Absolute Nucleated RBC  0.01 


 


Nucleated RBC %  0.1 


 


PT   11.5


 


INR   1.0














- Rads (name of study)


  ** CTH


Radiology: See rad report (no acute)





  ** CTCS


Radiology: See rad report (no acute)





Procedures





- Laceration (location)


  ** preauricular


Length in cm: 7


Wound type: Stellate


Neurovascular status: Sensory intact, Motor intact


Anesthesia: Lidocaine 1% with epi (10 cc)


Wound Preparation: Irrigated copiously NS, Wound explored, To the base, 

Multiple flaps aligned, Other (small amount of avascular tissue trimmed)


Skin layer closure: Nylon, Size #-0 - enter number (5), Sutures - enter # (13)


Other: Patient tolerated well, No complications, Neurovascular intact, Dressing 

applied, Tetanus UTD


Complexity: Intermediate





PD MEDICAL DECISION MAKING





- Sepsis Event


Vital Signs: 


 Vital Signs - 24 hr











  08/01/18 08/01/18





  08:33 10:15


 


Temperature 36.5 C 


 


Heart Rate 84 72


 


Respiratory 16 16





Rate  


 


Blood Pressure 113/84 H 123/71


 


O2 Saturation 96 98








 Oxygen











O2 Source []                   Room air


 


O2 Source                      Room air

















Departure





- Departure


Disposition: 01 Home, Self Care


Clinical Impression: 


Face lacerations


Qualifiers:


 Encounter type: initial encounter Qualified Code(s): S01.81XA - Laceration 

without foreign body of other part of head, initial encounter





Head injury


Qualifiers:


 Encounter type: initial encounter Qualified Code(s): S09.90XA - Unspecified 

injury of head, initial encounter





Condition: Good


Instructions:  ED Head Injury Closed


Follow-Up: 


Leo Ray MD [Primary Care Provider] -  (for suture removal in 7 days 

and further evaluation of your easy bruising and bleeding)


Comments: 


The CT scans did not show any skull or spine fracture and no bleeding in the 

brain


Your labs were fine - normal platelets and INR - I am not sure why your bruise 

and bleed so easily and suggest you follow up with Dr Ray


Keep the wound clean


Keep the dressing on for 24 hr


Apply bacitracin twice a day after that


Sutures out 7 days


Even with good wound care, some wounds get infected - if you notice redness 

swelling severe pain fever or drainage, please come back to the ER

## 2018-08-01 NOTE — CT REPORT
Procedure Date:  08/01/2018   

Accession Number:  [id number] / [id number]                    

Procedure:  CT  - Head W/O CPT Code:  

 

FULL RESULT:

 

 

EXAM:

CT HEAD

 

EXAM DATE: 8/1/2018 10:02 AM.

 

CLINICAL HISTORY: Fall [place].

 

COMPARISON: CT head 06/17/2018.

 

TECHNIQUE: Multiaxial CT images were obtained from the foramen magnum to 

the vertex. Reformats: Sagittal and coronal. IV contrast: None.

 

In accordance with CT protocol optimization, one or more of the following 

dose reduction techniques were utilized for this exam: automated exposure 

control, adjustment of mA and/or KV based on patient size, or use of 

iterative reconstructive technique.

 

FINDINGS:

Parenchyma: No intraparenchymal hemorrhage. No evidence of mass, midline 

shift, or CT findings of infarction. Gray-white differentiation is 

distinct.

 

Extraaxial Spaces: Normal for age. No subdural or epidural collections 

identified.

 

Ventricles: Normal in size and position.

 

Sinuses and Orbits: Stable partial opacification of the left mastoid air 

cells. Chronic maxillary sinusitis with osseous thickening. Partial 

opacification of the ethmoid air cells.

 

Bones: No evidence of fracture or calvarial defect.

 

Other: None.

IMPRESSION: No acute intracranial process

 

[facility]

## 2018-09-10 ENCOUNTER — HOSPITAL ENCOUNTER (OUTPATIENT)
Dept: HOSPITAL 76 - DI | Age: 58
Discharge: HOME | End: 2018-09-10
Attending: FAMILY MEDICINE
Payer: MEDICARE

## 2018-09-10 DIAGNOSIS — M79.671: Primary | ICD-10-CM

## 2018-09-11 NOTE — CT REPORT
Reason:  FOOT PAIN, RIGHT

Procedure Date:  09/10/2018   

Accession Number:  [id number] / [id number]                    

Procedure:  CT  - Lower Extremity Right W/O CPT Code:  

 

FULL RESULT:

 

 

EXAM:

RIGHT ANKLE/HINDFOOT CT WITHOUT CONTRAST

 

EXAM DATE: 9/10/2018 01:32 PM.

 

CLINICAL HISTORY: Foot pain, right.

 

COMPARISON: Radiograph 09/07/2018.

 

TECHNIQUE: Thin-section axial images were acquired of the ankle/hindfoot 

without contrast. Post-processing: Coronal and sagittal reformats. Other: 

None.

 

In accordance with CT protocol optimization, one or more of the following 

dose reduction techniques were utilized for this exam: automated exposure 

control, adjustment of mA and/or KV based on patient size, or use of 

iterative reconstructive technique.

 

FINDINGS:

Bones: No fracture or bone lesion.  A benign ossicle is adjacent to the 

cuboid.

 

Joints: The joint spaces are preserved. No calcified loose bodies. No 

large effusion.

 

Musculature: Moderate fatty atrophy is an intrinsic musculature of the 

foot.

 

Other: Arterial calcifications indicate atherosclerosis. There is 

calcific enthesopathy at the Achilles insertion.

IMPRESSION: No acute findings.

 

[facility]

## 2018-10-26 ENCOUNTER — HOSPITAL ENCOUNTER (OUTPATIENT)
Dept: HOSPITAL 76 - LAB.WCP | Age: 58
Discharge: HOME | End: 2018-10-26
Attending: FAMILY MEDICINE
Payer: MEDICARE

## 2018-10-26 DIAGNOSIS — I25.5: ICD-10-CM

## 2018-10-26 DIAGNOSIS — E11.9: Primary | ICD-10-CM

## 2018-10-26 DIAGNOSIS — I25.10: ICD-10-CM

## 2018-10-26 LAB
ALBUMIN DIAFP-MCNC: 3.9 G/DL (ref 3.2–5.5)
ALBUMIN/GLOB SERPL: 1.5 {RATIO} (ref 1–2.2)
ALP SERPL-CCNC: 85 IU/L (ref 42–121)
ALT SERPL W P-5'-P-CCNC: 21 IU/L (ref 10–60)
ANION GAP SERPL CALCULATED.4IONS-SCNC: 10 MMOL/L (ref 6–13)
AST SERPL W P-5'-P-CCNC: 16 IU/L (ref 10–42)
BASOPHILS NFR BLD AUTO: 0.1 10^3/UL (ref 0–0.1)
BASOPHILS NFR BLD AUTO: 0.7 %
BILIRUB BLD-MCNC: 0.7 MG/DL (ref 0.2–1)
BUN SERPL-MCNC: 15 MG/DL (ref 6–20)
CALCIUM UR-MCNC: 8.5 MG/DL (ref 8.5–10.3)
CHLORIDE SERPL-SCNC: 98 MMOL/L (ref 101–111)
CO2 SERPL-SCNC: 30 MMOL/L (ref 21–32)
CREAT SERPLBLD-SCNC: 1 MG/DL (ref 0.6–1.2)
EOSINOPHIL # BLD AUTO: 0.2 10^3/UL (ref 0–0.7)
EOSINOPHIL NFR BLD AUTO: 2.1 %
ERYTHROCYTE [DISTWIDTH] IN BLOOD BY AUTOMATED COUNT: 14.3 % (ref 12–15)
EST. AVERAGE GLUCOSE BLD GHB EST-MCNC: 223 MG/DL (ref 70–100)
GFRSERPLBLD MDRD-ARVRAT: 77 ML/MIN/{1.73_M2} (ref 89–?)
GLOBULIN SER-MCNC: 2.6 G/DL (ref 2.1–4.2)
GLUCOSE SERPL-MCNC: 172 MG/DL (ref 70–100)
HB2 TOTAL: 13.3 G/DL
HBA1C BLD-MCNC: 1.06 G/DL
HEMOGLOBIN A1C %: 9.4 % (ref 4.6–6.2)
HGB UR QL STRIP: 12.7 G/DL (ref 14–18)
LYMPHOCYTES # SPEC AUTO: 2.1 10^3/UL (ref 1.5–3.5)
LYMPHOCYTES NFR BLD AUTO: 23.5 %
MCH RBC QN AUTO: 31.1 PG (ref 27–31)
MCHC RBC AUTO-ENTMCNC: 34.1 G/DL (ref 32–36)
MCV RBC AUTO: 91.2 FL (ref 80–94)
MONOCYTES # BLD AUTO: 0.9 10^3/UL (ref 0–1)
MONOCYTES NFR BLD AUTO: 10.2 %
NEUTROPHILS # BLD AUTO: 5.5 10^3/UL (ref 1.5–6.6)
NEUTROPHILS # SNV AUTO: 8.7 X10^3/UL (ref 4.8–10.8)
NEUTROPHILS NFR BLD AUTO: 63.5 %
PDW BLD AUTO: 9.2 FL (ref 7.4–11.4)
PLATELET # BLD: 206 10^3/UL (ref 130–450)
PROT SPEC-MCNC: 6.5 G/DL (ref 6.7–8.2)
RBC MAR: 4.09 10^6/UL (ref 4.7–6.1)
SODIUM SERPLBLD-SCNC: 138 MMOL/L (ref 135–145)

## 2018-10-26 PROCEDURE — 85025 COMPLETE CBC W/AUTO DIFF WBC: CPT

## 2018-10-26 PROCEDURE — 84443 ASSAY THYROID STIM HORMONE: CPT

## 2018-10-26 PROCEDURE — 83036 HEMOGLOBIN GLYCOSYLATED A1C: CPT

## 2018-10-26 PROCEDURE — 36415 COLL VENOUS BLD VENIPUNCTURE: CPT

## 2018-10-26 PROCEDURE — 80053 COMPREHEN METABOLIC PANEL: CPT

## 2018-10-26 PROCEDURE — 82043 UR ALBUMIN QUANTITATIVE: CPT

## 2018-12-13 ENCOUNTER — HOSPITAL ENCOUNTER (OUTPATIENT)
Dept: HOSPITAL 76 - SDS | Age: 58
Discharge: HOME | End: 2018-12-13
Attending: OPHTHALMOLOGY
Payer: MEDICARE

## 2018-12-13 VITALS — SYSTOLIC BLOOD PRESSURE: 133 MMHG | DIASTOLIC BLOOD PRESSURE: 97 MMHG

## 2018-12-13 DIAGNOSIS — J44.9: ICD-10-CM

## 2018-12-13 DIAGNOSIS — I25.10: ICD-10-CM

## 2018-12-13 DIAGNOSIS — N28.9: ICD-10-CM

## 2018-12-13 DIAGNOSIS — F32.9: ICD-10-CM

## 2018-12-13 DIAGNOSIS — H25.812: Primary | ICD-10-CM

## 2018-12-13 DIAGNOSIS — I11.0: ICD-10-CM

## 2018-12-13 DIAGNOSIS — E78.00: ICD-10-CM

## 2018-12-13 DIAGNOSIS — I25.2: ICD-10-CM

## 2018-12-13 DIAGNOSIS — E10.3593: ICD-10-CM

## 2018-12-13 DIAGNOSIS — N40.0: ICD-10-CM

## 2018-12-13 DIAGNOSIS — I50.9: ICD-10-CM

## 2018-12-13 DIAGNOSIS — E10.42: ICD-10-CM

## 2018-12-13 DIAGNOSIS — Z79.4: ICD-10-CM

## 2018-12-13 DIAGNOSIS — Z95.810: ICD-10-CM

## 2018-12-13 PROCEDURE — 08RK3JZ REPLACEMENT OF LEFT LENS WITH SYNTHETIC SUBSTITUTE, PERCUTANEOUS APPROACH: ICD-10-PCS | Performed by: OPHTHALMOLOGY

## 2018-12-13 PROCEDURE — 66984 XCAPSL CTRC RMVL W/O ECP: CPT

## 2018-12-13 NOTE — OPERATIVE REPORT
DATE OF SERVICE: 12/13/2018

Physician: Oh Stoll MD

 

PREOPERATIVE DIAGNOSIS:  Visually significant cataract, left eye.  This is his first cataract surgery
.

 

POSTOPERATIVE DIAGNOSIS:  Visually significant cataract, left eye.  This is his first cataract surger
y.

 

NAME OF PROCEDURE:  Phacoemulsification with posterior chamber intraocular lens implant, left eye.

 

SURGEON:  Oh Stoll MD

 

ANESTHESIA:  Monitored anesthesia care.

 

COMPLICATIONS:  None.

 

OPERATIVE INDICATIONS:  This is a 59-year-old man with progressive vision loss in the left eye due to
 a 2+ nuclear sclerotic and 1-2+ posterior subcapsular cataract.  Best corrected visual acuity was 20
/25 with glare to 20/60 in the left eye.  Indications for surgery were overall decrease in vision, di
fficulty seeing words on the computer screen, difficulty reading; difficulty seeing words, closed cap
tion or game scores on TV; difficulty seeing street signs, difficulty driving in low light or at nigh
t, difficulty driving at night because of headlights from other vehicles and/or street lights, diffic
ulty with glare or bright lights in any situation, and difficulty tracking a golf ball and decreased 
acuity with firearms.  He was consented at length concerning the risks and benefits of cataract surge
ry, after which he expressed a desire to proceed with surgery.

 

OPERATIVE PROCEDURE:  The patient was taken to OR #3 and placed under monitored anesthesia care.  Joan
gical timeout was conducted confirming correct patient, correct procedure, and correct surgical site.
  He was given topical anesthesia and then prepped and draped in the usual sterile fashion.  The eye 
was entered at the 6 and 3 o'clock positions.  Intracameral Shugarcaine was injected into the anterio
r chamber, followed by Viscoat.  A continuous-tear curvilinear capsulorrhexis was performed.  The nuc
leus was hydrodissected and phacoemulsified.  The cortex was evacuated using automated infusion and a
spiration.  Provisc was injected in the capsular bag, and a 21.5 diopter intraocular lens inserted in
 the bag.  Approximately 0.9 mL of a mixture of triamcinolone, moxifloxacin, and vancomycin was injec
gaby subconjunctivally in the superior quadrant for infection and inflammation prophylaxis.  I and A w
as used to evacuate the viscoelastic material.  The eye was inflated to physiologic pressure using ba
lanced salt solution and found to be watertight.  The patient was taken from the operating room in go
od condition and given postoperative instructions.

 

 

DD: 12/13/2018 08:58

TD: 12/13/2018 09:05

Job #: [id number]

## 2018-12-13 NOTE — ANESTHESIA
Pre-Anesthesia VS, & Labs





- Diagnosis





left eye senile combined cataract





- Procedure





Left eye cataract extraction with IOL implant


Vital Signs: 





                                        











Temp Pulse Resp BP Pulse Ox


 


 37 C   91   18   140/74 H  96 


 


 12/13/18 07:19  12/13/18 07:19  12/13/18 07:19  12/13/18 07:19  12/13/18 07:19














                                        





Height                           5 ft 11 in


Weight (kg)                      119 kg


Body Mass Index                  36.2











- NPO


>8 hours





Home Medications and Allergies





                                        





Pantoprazole [Protonix] 40 mg PO BIDAC 11/11/14 


Spironolactone 12.5 mg PO DAILY 11/11/14 


Zolpidem Tartrate 10 mg PO QPM PRN 11/11/14 


raNITIdine HCl [Ranitidine HCl] 300 mg PO QPM 11/11/14 


Insulin Lispro [Humalog] 1 - 11 units SQ .SLIDING SCALE 12/14/16 


Pregabalin [Lyrica] 300 mg PO BID 12/14/16 


Nortriptyline HCl 20 mg PO 0800,1700 05/08/17 


Alprazolam 0.5 mg PO BID PRN 06/14/17 


Cetirizine [ZyrTEC] 10 mg PO DAILY 06/14/17 


Diclofenac Sodium [Voltaren] 4 gm TP QID PRN 06/14/17 


Lidocaine Patch 5% [Lidoderm Patch] 1 each TOP DAILY PRN 06/14/17 


Simvastatin 80 mg PO QPM 06/14/17 


Cholestyramine [Questran] 1 pkt PO TID 03/28/18 


Furosemide 20 mg PO BID 03/28/18 


Metoprolol Succinate/Hctz [Metoprolol ER-Hctz 25-12.5 mg] 1 each PO DAILY 

03/28/18 


Nitroglycerin [Nitrostat] 1 tab SL PRN PRN 03/28/18 


Pramlintide Acetate [Symlinpen 60] 15 mcg SQ AC 03/28/18 


Doxazosin [Cardura] 8 mg PO DAILY 06/17/18 


Hydrocortisone [Cortef]  06/17/18 


Prasugrel HCl 10 mg PO 06/17/18 


Isosorbide Mononitrate [Isosorbide Mononitrate ER] 30 mg PO 08/01/18 








Allergies/Adverse Reactions: 


                                    Allergies











Allergy/AdvReac Type Severity Reaction Status Date / Time


 


No Known Drug Allergies Allergy   Verified 08/01/18 08:36














Anes History & Medical History





- Anesthetic History


Anesthesia Complications: reports: No previous complications





- Medical History


Cardiovascular: reports: Congestive heart failure, Hypertension, High 

cholesterol, Coronary artery disease, MI, Other (pacemeker/AICD. EF 33% on last 

exam. Last nuclear medicine stress test show no new areas of ischemia.)


Pulmonary: reports: COPD, Shortness of breath, Other (Sleeps sitting up.)


Gastrointestinal: reports: GERD, Chronic diarrhea, Other


Urinary: reports: Benign prostate hypertrophy, Renal insuffiency


Neuro: reports: None, Peripheral neuropathy (worse on left leg.)


Musculoskeletal: reports: Osteoarthritis


Endocrine/Autoimmune: reports: Type 1 diabetes, Other (Uses insulin pump and 

continuous glucose monitoring.)


Blood Disorders: reports: None


Skin: reports: None


Smoking Status: Never smoker


Psychosocial: reports: Depression, Anxiety, Opioid (Chronic opioid use), Other 

(PTSD)





- Surgical History


General: Colonoscopy, EGD


Cardiothoracic: Coronary stent, AICD, Cardiac catheterization


Urologic: Prostatic surgery, Testicular surgery


Orthopedic: Carpal Tunnel surgery





Exam


General: Alert, Oriented x3, Cooperative, No acute distress


Dental: WNL


Mouth Opening: 3 Fingerbreadth


Neck Mobility: Normal


Mallampati classification: II


Thyromental Distance: 4-6 cm


Respiratory: Lungs clear, Normal breath sounds, No respiratory distress, No 

accessory muscle use


Cardiovascular: Regular rate, Normal S1, Normal S2, No murmurs


Mental/Cognitive Status: Alert/Oriented X3, Normal for patient





Plan


Anesthesia Type: MAC


Consent for Procedure(s) Verified and Reviewed: Yes


Code Status: Attempt Resuscitation


ASA classification: 4-Incapacitating disease


Is this case an emergency?: No

## 2018-12-15 ENCOUNTER — HOSPITAL ENCOUNTER (EMERGENCY)
Dept: HOSPITAL 76 - ED | Age: 58
Discharge: HOME | End: 2018-12-15
Payer: MEDICARE

## 2018-12-15 VITALS — DIASTOLIC BLOOD PRESSURE: 66 MMHG | SYSTOLIC BLOOD PRESSURE: 115 MMHG

## 2018-12-15 DIAGNOSIS — Z95.5: ICD-10-CM

## 2018-12-15 DIAGNOSIS — N40.1: Primary | ICD-10-CM

## 2018-12-15 DIAGNOSIS — I25.10: ICD-10-CM

## 2018-12-15 DIAGNOSIS — I50.9: ICD-10-CM

## 2018-12-15 DIAGNOSIS — I11.0: ICD-10-CM

## 2018-12-15 DIAGNOSIS — I25.2: ICD-10-CM

## 2018-12-15 DIAGNOSIS — Z95.810: ICD-10-CM

## 2018-12-15 DIAGNOSIS — N39.0: ICD-10-CM

## 2018-12-15 DIAGNOSIS — E10.42: ICD-10-CM

## 2018-12-15 DIAGNOSIS — E78.00: ICD-10-CM

## 2018-12-15 LAB
ALBUMIN DIAFP-MCNC: 3.7 G/DL (ref 3.2–5.5)
ALBUMIN/GLOB SERPL: 1.4 {RATIO} (ref 1–2.2)
ALP SERPL-CCNC: 81 IU/L (ref 42–121)
ALT SERPL W P-5'-P-CCNC: 53 IU/L (ref 10–60)
ANION GAP SERPL CALCULATED.4IONS-SCNC: 9 MMOL/L (ref 6–13)
AST SERPL W P-5'-P-CCNC: 27 IU/L (ref 10–42)
BASOPHILS # BLD MANUAL: 0 10^3/UL (ref 0–0.1)
BASOPHILS NFR BLD AUTO: 0.8 %
BILIRUB BLD-MCNC: 0.4 MG/DL (ref 0.2–1)
BUN SERPL-MCNC: 24 MG/DL (ref 6–20)
CALCIUM UR-MCNC: 8.7 MG/DL (ref 8.5–10.3)
CHLORIDE SERPL-SCNC: 92 MMOL/L (ref 101–111)
CLARITY UR REFRACT.AUTO: CLEAR
CO2 SERPL-SCNC: 33 MMOL/L (ref 21–32)
CREAT SERPLBLD-SCNC: 1.2 MG/DL (ref 0.6–1.2)
EOSINOPHIL # BLD MANUAL: 0 10^3/UL (ref 0–0.7)
EOSINOPHIL NFR BLD AUTO: 0.7 %
ERYTHROCYTE [DISTWIDTH] IN BLOOD BY AUTOMATED COUNT: 14.9 % (ref 12–15)
GFRSERPLBLD MDRD-ARVRAT: 62 ML/MIN/{1.73_M2} (ref 89–?)
GLOBULIN SER-MCNC: 2.7 G/DL (ref 2.1–4.2)
GLUCOSE SERPL-MCNC: 87 MG/DL (ref 70–100)
GLUCOSE UR QL STRIP.AUTO: NEGATIVE MG/DL
HGB UR QL STRIP: 13.4 G/DL (ref 14–18)
KETONES UR QL STRIP.AUTO: NEGATIVE MG/DL
LIPASE SERPL-CCNC: 17 U/L (ref 22–51)
LYMPH ABN NFR BLD MANUAL: 0 %
LYMPHOBLASTS # BLD: 9 %
LYMPHOCYTES # BLD MANUAL: 1.3 10^3/UL (ref 1.5–3.5)
LYMPHOCYTES NFR BLD AUTO: 8 %
MANUAL DIF COMMENT BLD-IMP: (no result)
MCH RBC QN AUTO: 31.1 PG (ref 27–31)
MCHC RBC AUTO-ENTMCNC: 34.1 G/DL (ref 32–36)
MCV RBC AUTO: 91.2 FL (ref 80–94)
MONOCYTES # BLD MANUAL: 1.4 10^3/UL (ref 0–1)
MONOCYTES NFR BLD AUTO: 8.3 %
MYELOCYTES NFR BLD: 1 %
NEUTROPHILS # SNV AUTO: 13.9 X10^3/UL (ref 4.8–10.8)
NEUTROPHILS NFR BLD AUTO: 82.2 %
NEUTROPHILS NFR BLD MANUAL: 11.1 10^3/UL (ref 1.5–6.6)
NEUTS BAND NFR BLD MANUAL: 80 %
NEUTS BAND NFR BLD: 0 %
NITRITE UR QL STRIP.AUTO: NEGATIVE
PDW BLD AUTO: 7.7 FL (ref 7.4–11.4)
PH UR STRIP.AUTO: 5 PH (ref 5–7.5)
PLATELET # BLD: 178 10^3/UL (ref 130–450)
PROT SPEC-MCNC: 6.4 G/DL (ref 6.7–8.2)
PROT UR STRIP.AUTO-MCNC: NEGATIVE MG/DL
RBC # UR STRIP.AUTO: NEGATIVE /UL
RBC MAR: 4.31 10^6/UL (ref 4.7–6.1)
SODIUM SERPLBLD-SCNC: 134 MMOL/L (ref 135–145)
SP GR UR STRIP.AUTO: 1.01 (ref 1–1.03)
SQUAMOUS URNS QL MICRO: (no result)
UROBILINOGEN UR QL STRIP.AUTO: (no result) E.U./DL
UROBILINOGEN UR STRIP.AUTO-MCNC: NEGATIVE MG/DL

## 2018-12-15 PROCEDURE — 80053 COMPREHEN METABOLIC PANEL: CPT

## 2018-12-15 PROCEDURE — 81001 URINALYSIS AUTO W/SCOPE: CPT

## 2018-12-15 PROCEDURE — 99283 EMERGENCY DEPT VISIT LOW MDM: CPT

## 2018-12-15 PROCEDURE — 74177 CT ABD & PELVIS W/CONTRAST: CPT

## 2018-12-15 PROCEDURE — 96374 THER/PROPH/DIAG INJ IV PUSH: CPT

## 2018-12-15 PROCEDURE — 83690 ASSAY OF LIPASE: CPT

## 2018-12-15 PROCEDURE — 99284 EMERGENCY DEPT VISIT MOD MDM: CPT

## 2018-12-15 PROCEDURE — 85025 COMPLETE CBC W/AUTO DIFF WBC: CPT

## 2018-12-15 PROCEDURE — 81003 URINALYSIS AUTO W/O SCOPE: CPT

## 2018-12-15 PROCEDURE — 84484 ASSAY OF TROPONIN QUANT: CPT

## 2018-12-15 PROCEDURE — 87086 URINE CULTURE/COLONY COUNT: CPT

## 2018-12-15 PROCEDURE — 36415 COLL VENOUS BLD VENIPUNCTURE: CPT

## 2018-12-15 NOTE — ED PHYSICIAN DOCUMENTATION
PD HPI ABD PAIN





- Stated complaint


Stated Complaint: LOWER ABD PX





- Chief complaint


Chief Complaint: Abd Pain





- History obtained from


History obtained from: Patient





- History of Present Illness


Timing - onset: How many days ago (3)


Timing - duration: Days (3)


Timing - details: Abrupt onset, Still present, Waxing and waning


Quality: Cramping, Sharp, Pain


Location: Suprapubic


Improved by: Laying still


Worsened by: Position, Palpation


Associated symptoms: No: Fever, Nausea, Vomiting, Diarrhea, Constipation, 

Dysuria


Similar symptoms before: Has not had sx before


Recently seen: Surgery





- Additional information


Additional information: 





58-year-old type I diabetic male has recently had cataract surgery which went 

well and without abnormality.  He has now over the last 3 days developed 

intermittent lower abdominal cramping pain.  Yesterday he had episodes lasting 

20 minutes and today he does have persistent pain.  He feels that there may be 

some distention of his abdomen as well.





He does have a history of collagenous colitis and this is in remission on 

budesonide.  He does have a history of [place]'s as well as the diabetes.  He 

has not had surgery to his abdomen.





The patient does state that he had a bowel movement yesterday that was normal he

has passed flatus today.  He does not have any nausea or vomiting and he has no 

change in his pain with eating.





Review of Systems


Constitutional: denies: Fever


Eyes: denies: Decreased vision


Ears: denies: Ear pain


Nose: denies: Congestion


Throat: denies: Sore throat


Cardiac: denies: Chest pain / pressure, Palpitations


Respiratory: denies: Dyspnea, Cough


GI: reports: Abdominal Pain, Abdominal Swelling.  denies: Nausea, Vomiting, 

Constipation, Diarrhea


: denies: Dysuria, Frequency


Skin: denies: Rash


Musculoskeletal: denies: Neck pain, Back pain, Extremity pain





PD PAST MEDICAL HISTORY





- Past Medical History


Cardiovascular: Congestive heart failure, Hypertension, High cholesterol, 

Coronary artery disease, MI, Other


Respiratory: COPD, Shortness of breath, Other


Neuro: None, Peripheral neuropathy


Endocrine/Autoimmune: Type 1 diabetes, Other


GI: GERD, Chronic diarrhea, Other


: Benign prostate hypertrophy, Renal insuffiency


HEENT: Other


Psych: Depression, Anxiety, Panic attacks, Post traumatic stress disorder


Musculoskeletal: Osteoarthritis


Derm: None





- Past Surgical History


Past Surgical History: Yes


General: Colonoscopy, EGD


Ortho: Carpal Tunnel surgery


/GYN: Other


Cardiovascular: Coronary stent, AICD, Cardiac catheterization


HEENT: Cataracts, Other





- Present Medications


Home Medications: 


                                Ambulatory Orders











 Medication  Instructions  Recorded  Confirmed


 


RX: Pantoprazole [Protonix] 40 mg PO BIDAC 11/11/14 12/13/18


 


RX: Spironolactone 12.5 mg PO DAILY 11/11/14 12/13/18


 


RX: Zolpidem Tartrate 10 mg PO QPM PRN 11/11/14 12/13/18


 


RX: raNITIdine HCl [Ranitidine HCl] 300 mg PO QPM 11/11/14 12/13/18


 


RX: Insulin Lispro [Humalog] 1 - 11 units SQ .SLIDING SCALE 12/14/16 03/28/18


 


RX: Pregabalin [Lyrica] 300 mg PO BID 12/14/16 12/13/18


 


RX: Nortriptyline HCl 20 mg PO 0800,1700 05/08/17 12/13/18


 


RX: Alprazolam 0.5 mg PO BID PRN 06/14/17 12/13/18


 


RX: Cetirizine [ZyrTEC] 10 mg PO DAILY 06/14/17 12/13/18


 


RX: Diclofenac Sodium [Voltaren] 4 gm TP QID PRN 06/14/17 12/13/18


 


RX: Lidocaine Patch 5% [Lidoderm 1 each TOP DAILY PRN 06/14/17 12/13/18





Patch]   


 


RX: Simvastatin 80 mg PO QPM 06/14/17 12/13/18


 


RX: Budesonide [Entocort EC] 9 mg PO DAILY #60 capsule 06/17/17 12/13/18


 


RX: Psyllium [Metamucil] 1 packet PO BID  packet 06/17/17 03/28/18


 


RX: oxyCODONE [Roxicodone] 5 mg PO Q6HR #0 06/17/17 12/13/18


 


RX: Aspirin Chewable [St Kwadwo 81 mg PO DAILY  tablet 08/06/17 12/13/18





Aspirin]   


 


RX: DULoxetine [Cymbalta] 60 mg PO BID  capsule 08/06/17 12/13/18


 


RX: Gabapentin [Neurontin] 300 mg PO BID  capsule 08/06/17 12/13/18


 


RX: buPROPion [Wellbutrin Xl] 150 mg PO DAILY  tablet 08/06/17 12/13/18


 


Metoprolol Succinate/Hctz 1 each PO DAILY 03/28/18 12/13/18





[Metoprolol ER-Hctz 25-12.5 mg]   


 


Nitroglycerin [Nitrostat] 1 tab SL PRN PRN 03/28/18 12/13/18


 


Pramlintide Acetate [Symlinpen 60] 15 mcg SQ AC 03/28/18 12/13/18


 


RX: Furosemide 20 mg PO BID 03/28/18 12/13/18


 


Doxazosin [Cardura] 8 mg PO DAILY 06/17/18 12/13/18


 


RX: Hydrocortisone [Cortef] 10 mg PO DAILY 06/17/18 12/13/18


 


Isosorbide Mononitrate [Isosorbide 30 mg PO DAILY 08/01/18 12/13/18





Mononitrate ER]   


 


RX: Gabapentin 300 mg PO BID 12/13/18 12/13/18














- Allergies


Allergies/Adverse Reactions: 


                                    Allergies











Allergy/AdvReac Type Severity Reaction Status Date / Time


 


No Known Drug Allergies Allergy   Verified 12/15/18 10:46














- Social History


Does the pt smoke?: No


Smoking Status: Never smoker


Does the pt drink ETOH?: No


Does the pt have substance abuse?: No





- Immunizations


Immunizations are current?: Yes





- POLST


Patient has POLST: No





PD ED PE NORMAL





- Vitals


Vital signs reviewed: Yes (normal )





- General


General: Alert and oriented X 3, No acute distress, Well developed/nourished





- HEENT


HEENT: Atraumatic, PERRL, EOMI





- Neck


Neck: Supple, no meningeal sign, No bony TTP





- Cardiac


Cardiac: RRR, No murmur





- Respiratory


Respiratory: No respiratory distress, Clear bilaterally





- Abdomen


Abdomen: Soft, Other (distended and mild tenderness to the left lower quadrant. 

There is no garding or rebound. There are bruises to the abdominal wall 

consistent with insulin injection site and there are both insulin pump and 

continuous glucose monitor in place. )





- Back


Back: No CVA TTP, No spinal TTP





- Derm


Derm: Normal color, Warm and dry, No rash





- Extremities


Extremities: No deformity, No edema





- Neuro


Neuro: Alert and oriented X 3, No motor deficit, No sensory deficit, Normal 

speech


Eye Opening: Spontaneous


Motor: Obeys Commands


Verbal: Oriented


GCS Score: 15





- Psych


Psych: Normal mood, Normal affect





Results





- Vitals


Vitals: 


                               Vital Signs - 24 hr











  12/15/18 12/15/18





  10:43 11:13


 


Temperature 36.1 C L 


 


Heart Rate 93 79


 


Respiratory 16 15





Rate  


 


Blood Pressure 127/78 115/66


 


O2 Saturation 96 93








                                     Oxygen











O2 Source []                   Room air


 


O2 Source                      Room air

















- Labs


Labs: 


                                Laboratory Tests











  12/15/18 12/15/18 12/15/18





  10:49 10:49 10:49


 


WBC  13.9 H  


 


RBC  4.31 L  


 


Hgb  13.4 L  


 


Hct  39.3 L  


 


MCV  91.2  


 


MCH  31.1 H  


 


MCHC  34.1  


 


RDW  14.9  


 


Plt Count  178  


 


MPV  7.7  


 


Neut # (Auto)  Not Reportable  


 


Lymph # (Auto)  Not Reportable  


 


Mono # (Auto)  Not Reportable  


 


Eos # (Auto)  Not Reportable  


 


Baso # (Auto)  Not Reportable  


 


Absolute Nucleated RBC  Not Reportable  


 


Total Counted  100  


 


Band Neuts % (Manual)  0  


 


Abnorm Lymph % (Manual)  0  


 


Myelocytes %  1 H  


 


Nucleated RBC %  Not Reportable  


 


Neutrophils # (Manual)  11.1 H  


 


Lymphocytes # (Manual)  1.3 L  


 


Monocytes # (Manual)  1.4 H  


 


Eosinophils # (Manual)  0.0  


 


Basophils # (Manual)  0.0  


 


Differential Comment  MANUAL DIFFERENTIAL  


 


Sodium   134 L 


 


Potassium   4.0 


 


Chloride   92 L 


 


Carbon Dioxide   33 H 


 


Anion Gap   9.0 


 


BUN   24 H 


 


Creatinine   1.2 


 


Estimated GFR (MDRD)   62 L 


 


Glucose   87 


 


Calcium   8.7 


 


Total Bilirubin   0.4 


 


AST   27 


 


ALT   53 


 


Alkaline Phosphatase   81 


 


Troponin I    < 0.04


 


Total Protein   6.4 L 


 


Albumin   3.7 


 


Globulin   2.7 


 


Albumin/Globulin Ratio   1.4 


 


Lipase   17 L 


 


Urine Color   


 


Urine Clarity   


 


Urine pH   


 


Ur Specific Gravity   


 


Urine Protein   


 


Urine Glucose (UA)   


 


Urine Ketones   


 


Urine Occult Blood   


 


Urine Nitrite   


 


Urine Bilirubin   


 


Urine Urobilinogen   


 


Ur Leukocyte Esterase   


 


Urine RBC   


 


Urine WBC   


 


Ur Squamous Epith Cells   


 


Urine Bacteria   


 


Ur Microscopic Review   


 


Urine Culture Comments   














  12/15/18





  13:05


 


WBC 


 


RBC 


 


Hgb 


 


Hct 


 


MCV 


 


MCH 


 


MCHC 


 


RDW 


 


Plt Count 


 


MPV 


 


Neut # (Auto) 


 


Lymph # (Auto) 


 


Mono # (Auto) 


 


Eos # (Auto) 


 


Baso # (Auto) 


 


Absolute Nucleated RBC 


 


Total Counted 


 


Band Neuts % (Manual) 


 


Abnorm Lymph % (Manual) 


 


Myelocytes % 


 


Nucleated RBC % 


 


Neutrophils # (Manual) 


 


Lymphocytes # (Manual) 


 


Monocytes # (Manual) 


 


Eosinophils # (Manual) 


 


Basophils # (Manual) 


 


Differential Comment 


 


Sodium 


 


Potassium 


 


Chloride 


 


Carbon Dioxide 


 


Anion Gap 


 


BUN 


 


Creatinine 


 


Estimated GFR (MDRD) 


 


Glucose 


 


Calcium 


 


Total Bilirubin 


 


AST 


 


ALT 


 


Alkaline Phosphatase 


 


Troponin I 


 


Total Protein 


 


Albumin 


 


Globulin 


 


Albumin/Globulin Ratio 


 


Lipase 


 


Urine Color  LIGHT YELLOW


 


Urine Clarity  CLEAR


 


Urine pH  5.0


 


Ur Specific Gravity  1.015


 


Urine Protein  NEGATIVE


 


Urine Glucose (UA)  NEGATIVE


 


Urine Ketones  NEGATIVE


 


Urine Occult Blood  NEGATIVE


 


Urine Nitrite  NEGATIVE


 


Urine Bilirubin  NEGATIVE


 


Urine Urobilinogen  0.2 (NORMAL)


 


Ur Leukocyte Esterase  SMALL H


 


Urine RBC  None Seen


 


Urine WBC  0-3


 


Ur Squamous Epith Cells  RARE Squamous


 


Urine Bacteria  Rare


 


Ur Microscopic Review  INDICATED


 


Urine Culture Comments  INDICATED














- Rads (name of study)


  ** CT abd/pel without 


Radiology: Prelim report reviewed (Impression: Moderately distended urine-filled

 urinary bladder without wall thickening or bladder calculi, possibly the 

etiology of the patient's symptoms.  No urinary tract obstruction.), EMP read 

indepedently, See rad report





Procedures





- IVC sono (time)


  ** 1108


Bedside IVC sono: IVC measures (cm) (1.64), Euvolemia





PD MEDICAL DECISION MAKING





- ED course


Complexity details: reviewed results, re-evaluated patient, considered 

differential, d/w patient


ED course: 





THis 59 y/o male with a history of type one diabetes is on an insulin pump and 

has excellent control of his diabetes. Today he has lower abdominal pain and on 

CT it is evident he has an over filled bladder with estimate of >1500ml urine. 

The patient is able to void spontaneously and has a post void residual of 30ml. 

His pain is improved. Catheterization is not required. 





Departure





- Departure


Disposition: 01 Home, Self Care


Clinical Impression: 


 Urinary retention





Condition: Stable


Instructions:  ED Retention Urinary Male


Follow-Up: 


Leo Ray MD [Primary Care Provider] - 


Discharge Date/Time: 12/15/18 14:06

## 2018-12-15 NOTE — CT REPORT
Reason:  lower abdominal pain distention

Procedure Date:  12/15/2018   

Accession Number:  [id number] / [id number]                    

Procedure:  CT  - Abdomen/Pelvis W/ CPT Code:  

 

FULL RESULT:

 

 

EXAM:

CT ABDOMEN AND PELVIS WITH CONTRAST

 

EXAM DATE: 12/15/2018 11:49 AM.

 

CLINICAL HISTORY: Lower abdominal pain and distention in a 58-year-old 

male.

 

COMPARISONS: Abdomen/pelvis w/ 06/29/2016 3:55 PM and previous.

 

TECHNIQUE: Emergent axial helical CT imaging was performed through the 

abdomen and pelvis. IV contrast: OPTIRAY 320, 100 mL. Enteric contrast: 

None. Reconstructions: Coronal and sagittal.

 

In accordance with CT protocol optimization, one or more of the following 

dose reduction techniques were utilized for this exam: automated exposure 

control, adjustment of mA and/or KV based on patient size, or use of 

iterative reconstructive technique.

 

FINDINGS:

Lung Bases: Unremarkable.

 

Liver: Normal. No masses.

 

Gallbladder/Bile Ducts: No gallstones, wall thickening or dilated bile 

ducts.

 

Spleen: Normal.

 

Pancreas: Normal.

 

Adrenal Glands: Normal.

 

Kidneys: Normal. No masses, nephrolithiasis, hydroureter or 

hydronephrosis.

 

Peritoneal Cavity/Bowel: Normal. No free fluid, free air or adenopathy. 

No masses or acute inflammatory process. The appendix and terminal ileum 

are well visualized and normal.

 

Pelvic Organs: Moderately distended urine-filled urinary bladder. No wall 

thickening or bladder calculi. Pelvis otherwise unremarkable without 

adenopathy or ascites.

 

Vasculature: No aneurysms or other significant abnormality.

 

Bones: No significant abnormality.

 

Other: Small umbilical fat hernia approximately 1.5 cm in diameter, 

similar to prior exam. No bowel involvement.

IMPRESSION: Moderately distended urine-filled urinary bladder without 

wall thickening or bladder calculi, possibly the etiology of the 

patient's symptoms. No urinary tract obstruction.

 

Remainder of the abdomen and pelvis stable in appearance. Small umbilical 

fat hernia noted, stable without bowel involvement.

 

[facility]

## 2019-01-09 ENCOUNTER — HOSPITAL ENCOUNTER (OUTPATIENT)
Dept: HOSPITAL 76 - LAB.R | Age: 59
Discharge: HOME | End: 2019-01-09
Attending: FAMILY MEDICINE
Payer: MEDICARE

## 2019-01-09 DIAGNOSIS — Z79.899: Primary | ICD-10-CM

## 2019-01-09 LAB
AMPHET UR QL SCN: NEGATIVE
BENZODIAZ UR QL SCN: POSITIVE
COCAINE UR-SCNC: NEGATIVE UMOL/L
METHADONE UR QL SCN: NEGATIVE
METHAMPHET UR QL SCN: NEGATIVE
OPIATES UR QL SCN: NEGATIVE
VOLATILE DRUGS POS SERPL SCN: (no result)

## 2019-01-09 PROCEDURE — 80306 DRUG TEST PRSMV INSTRMNT: CPT

## 2019-01-12 ENCOUNTER — HOSPITAL ENCOUNTER (OUTPATIENT)
Dept: HOSPITAL 76 - EMS | Age: 59
End: 2019-01-12
Attending: SURGERY
Payer: MEDICARE

## 2019-01-12 DIAGNOSIS — R41.0: Primary | ICD-10-CM

## 2019-01-13 ENCOUNTER — HOSPITAL ENCOUNTER (OUTPATIENT)
Dept: HOSPITAL 76 - ED | Age: 59
Setting detail: OBSERVATION
LOS: 1 days | Discharge: HOME | End: 2019-01-14
Attending: FAMILY MEDICINE | Admitting: FAMILY MEDICINE
Payer: MEDICARE

## 2019-01-13 DIAGNOSIS — I50.9: ICD-10-CM

## 2019-01-13 DIAGNOSIS — G89.29: ICD-10-CM

## 2019-01-13 DIAGNOSIS — Z79.4: ICD-10-CM

## 2019-01-13 DIAGNOSIS — E10.22: ICD-10-CM

## 2019-01-13 DIAGNOSIS — R40.1: Primary | ICD-10-CM

## 2019-01-13 DIAGNOSIS — M54.9: ICD-10-CM

## 2019-01-13 DIAGNOSIS — I13.0: ICD-10-CM

## 2019-01-13 DIAGNOSIS — F32.9: ICD-10-CM

## 2019-01-13 DIAGNOSIS — Z79.51: ICD-10-CM

## 2019-01-13 DIAGNOSIS — Z95.811: ICD-10-CM

## 2019-01-13 DIAGNOSIS — Z79.82: ICD-10-CM

## 2019-01-13 DIAGNOSIS — Z79.891: ICD-10-CM

## 2019-01-13 DIAGNOSIS — Z96.41: ICD-10-CM

## 2019-01-13 DIAGNOSIS — E10.65: ICD-10-CM

## 2019-01-13 DIAGNOSIS — E86.0: ICD-10-CM

## 2019-01-13 DIAGNOSIS — E27.1: ICD-10-CM

## 2019-01-13 DIAGNOSIS — I25.2: ICD-10-CM

## 2019-01-13 DIAGNOSIS — I25.10: ICD-10-CM

## 2019-01-13 DIAGNOSIS — J44.9: ICD-10-CM

## 2019-01-13 DIAGNOSIS — Z95.5: ICD-10-CM

## 2019-01-13 DIAGNOSIS — Z72.89: ICD-10-CM

## 2019-01-13 DIAGNOSIS — N18.9: ICD-10-CM

## 2019-01-13 DIAGNOSIS — K21.9: ICD-10-CM

## 2019-01-13 DIAGNOSIS — G43.909: ICD-10-CM

## 2019-01-13 DIAGNOSIS — N40.0: ICD-10-CM

## 2019-01-13 DIAGNOSIS — K52.9: ICD-10-CM

## 2019-01-13 DIAGNOSIS — E10.42: ICD-10-CM

## 2019-01-13 DIAGNOSIS — I95.9: ICD-10-CM

## 2019-01-13 LAB
ALBUMIN DIAFP-MCNC: 3.7 G/DL (ref 3.2–5.5)
ALBUMIN/GLOB SERPL: 1.3 {RATIO} (ref 1–2.2)
ALP SERPL-CCNC: 82 IU/L (ref 42–121)
ALT SERPL W P-5'-P-CCNC: 18 IU/L (ref 10–60)
AMPHET UR QL SCN: NEGATIVE
ANION GAP SERPL CALCULATED.4IONS-SCNC: 9 MMOL/L (ref 6–13)
AST SERPL W P-5'-P-CCNC: 15 IU/L (ref 10–42)
BASOPHILS NFR BLD AUTO: 0 10^3/UL (ref 0–0.1)
BASOPHILS NFR BLD AUTO: 0.4 %
BENZODIAZ UR QL SCN: POSITIVE
BILIRUB BLD-MCNC: 0.6 MG/DL (ref 0.2–1)
BUN SERPL-MCNC: 26 MG/DL (ref 6–20)
CALCIUM UR-MCNC: 8.4 MG/DL (ref 8.5–10.3)
CHLORIDE SERPL-SCNC: 98 MMOL/L (ref 101–111)
CK MB SERPL-MCNC: 4.3 NG/ML (ref 0.6–6.3)
CK SERPL-CCNC: 113 IU/L (ref 22–269)
CLARITY UR REFRACT.AUTO: CLEAR
CO2 SERPL-SCNC: 29 MMOL/L (ref 21–32)
COCAINE UR-SCNC: NEGATIVE UMOL/L
CREAT SERPLBLD-SCNC: 1.8 MG/DL (ref 0.6–1.2)
EOSINOPHIL # BLD AUTO: 0 10^3/UL (ref 0–0.7)
EOSINOPHIL NFR BLD AUTO: 0.2 %
ERYTHROCYTE [DISTWIDTH] IN BLOOD BY AUTOMATED COUNT: 15.3 % (ref 12–15)
GFRSERPLBLD MDRD-ARVRAT: 39 ML/MIN/{1.73_M2} (ref 89–?)
GLOBULIN SER-MCNC: 2.9 G/DL (ref 2.1–4.2)
GLUCOSE SERPL-MCNC: 135 MG/DL (ref 70–100)
GLUCOSE UR QL STRIP.AUTO: 100 MG/DL
HGB UR QL STRIP: 12.4 G/DL (ref 14–18)
KETONES UR QL STRIP.AUTO: NEGATIVE MG/DL
LIPASE SERPL-CCNC: 19 U/L (ref 22–51)
LYMPHOCYTES # SPEC AUTO: 1.1 10^3/UL (ref 1.5–3.5)
LYMPHOCYTES NFR BLD AUTO: 9 %
MCH RBC QN AUTO: 30.5 PG (ref 27–31)
MCHC RBC AUTO-ENTMCNC: 32.7 G/DL (ref 32–36)
MCV RBC AUTO: 93.1 FL (ref 80–94)
METHADONE UR QL SCN: NEGATIVE
METHAMPHET UR QL SCN: NEGATIVE
MONOCYTES # BLD AUTO: 1 10^3/UL (ref 0–1)
MONOCYTES NFR BLD AUTO: 8.4 %
NEUTROPHILS # BLD AUTO: 9.8 10^3/UL (ref 1.5–6.6)
NEUTROPHILS # SNV AUTO: 11.9 X10^3/UL (ref 4.8–10.8)
NEUTROPHILS NFR BLD AUTO: 82 %
NITRITE UR QL STRIP.AUTO: NEGATIVE
OPIATES UR QL SCN: NEGATIVE
PDW BLD AUTO: 8.9 FL (ref 7.4–11.4)
PH UR STRIP.AUTO: 6 PH (ref 5–7.5)
PLATELET # BLD: 235 10^3/UL (ref 130–450)
PROT SPEC-MCNC: 6.6 G/DL (ref 6.7–8.2)
PROT UR STRIP.AUTO-MCNC: NEGATIVE MG/DL
RBC # UR STRIP.AUTO: NEGATIVE /UL
RBC MAR: 4.06 10^6/UL (ref 4.7–6.1)
SODIUM SERPLBLD-SCNC: 136 MMOL/L (ref 135–145)
SP GR UR STRIP.AUTO: 1.01 (ref 1–1.03)
TROPONIN I SERPL-MCNC: < 0.04 NG/ML (ref ?–0.49)
UROBILINOGEN UR QL STRIP.AUTO: (no result) E.U./DL
UROBILINOGEN UR STRIP.AUTO-MCNC: NEGATIVE MG/DL
VOLATILE DRUGS POS SERPL SCN: (no result)

## 2019-01-13 PROCEDURE — 80306 DRUG TEST PRSMV INSTRMNT: CPT

## 2019-01-13 PROCEDURE — 85027 COMPLETE CBC AUTOMATED: CPT

## 2019-01-13 PROCEDURE — 87040 BLOOD CULTURE FOR BACTERIA: CPT

## 2019-01-13 PROCEDURE — 84484 ASSAY OF TROPONIN QUANT: CPT

## 2019-01-13 PROCEDURE — 83605 ASSAY OF LACTIC ACID: CPT

## 2019-01-13 PROCEDURE — 81003 URINALYSIS AUTO W/O SCOPE: CPT

## 2019-01-13 PROCEDURE — 93005 ELECTROCARDIOGRAM TRACING: CPT

## 2019-01-13 PROCEDURE — 82533 TOTAL CORTISOL: CPT

## 2019-01-13 PROCEDURE — 36415 COLL VENOUS BLD VENIPUNCTURE: CPT

## 2019-01-13 PROCEDURE — 96360 HYDRATION IV INFUSION INIT: CPT

## 2019-01-13 PROCEDURE — 85025 COMPLETE CBC W/AUTO DIFF WBC: CPT

## 2019-01-13 PROCEDURE — 80048 BASIC METABOLIC PNL TOTAL CA: CPT

## 2019-01-13 PROCEDURE — 80053 COMPREHEN METABOLIC PANEL: CPT

## 2019-01-13 PROCEDURE — 80320 DRUG SCREEN QUANTALCOHOLS: CPT

## 2019-01-13 PROCEDURE — 70450 CT HEAD/BRAIN W/O DYE: CPT

## 2019-01-13 PROCEDURE — 82550 ASSAY OF CK (CPK): CPT

## 2019-01-13 PROCEDURE — 82553 CREATINE MB FRACTION: CPT

## 2019-01-13 PROCEDURE — 87086 URINE CULTURE/COLONY COUNT: CPT

## 2019-01-13 PROCEDURE — 81001 URINALYSIS AUTO W/SCOPE: CPT

## 2019-01-13 PROCEDURE — 83690 ASSAY OF LIPASE: CPT

## 2019-01-13 PROCEDURE — 99285 EMERGENCY DEPT VISIT HI MDM: CPT

## 2019-01-13 PROCEDURE — 96361 HYDRATE IV INFUSION ADD-ON: CPT

## 2019-01-13 RX ADMIN — SODIUM CHLORIDE SCH MLS/HR: 9 INJECTION, SOLUTION INTRAVENOUS at 23:59
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
## 2019-01-13 NOTE — CT REPORT
Reason:  headache, altered

Procedure Date:  01/13/2019   

Accession Number:  [id number] / [id number]                    

Procedure:  CT  - Head W/O CPT Code:  

 

FULL RESULT:

 

 

EXAM: CT HEAD WITHOUT CONTRAST.

 

EXAM DATE: 01/13/2019 08:25 PM.

 

CLINICAL HISTORY: Headache, altered.

 

COMPARISON: Head without 08/01/2018 9:54 AM.

 

TECHNIQUE: Multiaxial CT images were obtained from the foramen magnum to 

the vertex. Reformats: Sagittal and coronal. IV contrast: None.

 

In accordance with CT protocol optimization, one or more of the following 

dose reduction techniques were utilized for this exam: automated exposure 

control, adjustment of mA and/or KV based on patient size, or use of 

iterative reconstructive technique.

 

FINDINGS:

Parenchyma: No intraparenchymal hemorrhage. No evidence of mass, midline 

shift, or CT findings of infarction. Gray-white differentiation is 

distinct.

 

Extraaxial Spaces: Normal for age. No subdural or epidural collections 

identified.

 

Ventricles: Normal in size and position.

 

Sinuses and Orbits: Imaged paranasal sinuses, orbits, and mastoids show 

no significant abnormality.

 

Bones: No evidence of fracture or calvarial defect.

 

Other: None.

IMPRESSION: No acute intracranial abnormality.

 

[facility]

## 2019-01-14 VITALS — DIASTOLIC BLOOD PRESSURE: 74 MMHG | SYSTOLIC BLOOD PRESSURE: 118 MMHG

## 2019-01-14 LAB
ANION GAP SERPL CALCULATED.4IONS-SCNC: 5 MMOL/L (ref 6–13)
BUN SERPL-MCNC: 19 MG/DL (ref 6–20)
CALCIUM UR-MCNC: 8.3 MG/DL (ref 8.5–10.3)
CHLORIDE SERPL-SCNC: 102 MMOL/L (ref 101–111)
CO2 SERPL-SCNC: 31 MMOL/L (ref 21–32)
CREAT SERPLBLD-SCNC: 1.2 MG/DL (ref 0.6–1.2)
ERYTHROCYTE [DISTWIDTH] IN BLOOD BY AUTOMATED COUNT: 15.3 % (ref 12–15)
GFRSERPLBLD MDRD-ARVRAT: 62 ML/MIN/{1.73_M2} (ref 89–?)
GLUCOSE SERPL-MCNC: 119 MG/DL (ref 70–100)
HGB UR QL STRIP: 12.3 G/DL (ref 14–18)
MCH RBC QN AUTO: 31 PG (ref 27–31)
MCHC RBC AUTO-ENTMCNC: 34 G/DL (ref 32–36)
MCV RBC AUTO: 91.3 FL (ref 80–94)
NEUTROPHILS # SNV AUTO: 8.2 X10^3/UL (ref 4.8–10.8)
PDW BLD AUTO: 8.5 FL (ref 7.4–11.4)
PLATELET # BLD: 204 10^3/UL (ref 130–450)
RBC MAR: 3.97 10^6/UL (ref 4.7–6.1)
SODIUM SERPLBLD-SCNC: 138 MMOL/L (ref 135–145)

## 2019-01-14 RX ADMIN — SODIUM CHLORIDE, PRESERVATIVE FREE SCH: 5 INJECTION INTRAVENOUS at 02:16

## 2019-01-14 RX ADMIN — SODIUM CHLORIDE, PRESERVATIVE FREE SCH: 5 INJECTION INTRAVENOUS at 09:08

## 2019-01-14 RX ADMIN — SODIUM CHLORIDE SCH MLS/HR: 9 INJECTION, SOLUTION INTRAVENOUS at 06:11
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
## 2019-01-14 NOTE — DISCHARGE PLAN
Discharge Plan


Disposition: 01 Home, Self Care


Condition: Good


Diet: Diabetic


Activity Restrictions: Activity as Tolerated


Shower Restrictions: No


Driving Restrictions: No


Weight Bearing: Full Weight


Additional Instructions or Follow Up instructions: 


You were admitted with altered level of consciousness.  This was likely related 

to your depression and the mixture of medications.





Please ask your PCP to order a sleep study.  Please ask your PCP to think about 

pulmonary or cardiac rehab after your sleep study.





Please start a daily nasal steroid spray to treat your temporal headaches.  You 

can try Afrin spray x3 days or 6 doses, then stop for times when your headaches 

are really bad.





Please find a psychologist on the [place] for regular visits.  You can try [facility] in [place].  Phone # is [phone]





Please see your PCP within one week.


  


No Smoking: If you smoke, Please STOP!  Call [phone] for help.


Follow-up with: 


Leo Ray MD [Primary Care Provider] -

## 2019-01-17 ENCOUNTER — HOSPITAL ENCOUNTER (OUTPATIENT)
Dept: HOSPITAL 76 - SDS | Age: 59
Discharge: HOME | End: 2019-01-17
Attending: OPHTHALMOLOGY
Payer: MEDICARE

## 2019-01-17 VITALS — DIASTOLIC BLOOD PRESSURE: 74 MMHG | SYSTOLIC BLOOD PRESSURE: 153 MMHG

## 2019-01-17 DIAGNOSIS — I25.10: ICD-10-CM

## 2019-01-17 DIAGNOSIS — E10.3593: ICD-10-CM

## 2019-01-17 DIAGNOSIS — Z87.891: ICD-10-CM

## 2019-01-17 DIAGNOSIS — I11.0: ICD-10-CM

## 2019-01-17 DIAGNOSIS — J44.9: ICD-10-CM

## 2019-01-17 DIAGNOSIS — I25.2: ICD-10-CM

## 2019-01-17 DIAGNOSIS — H25.811: Primary | ICD-10-CM

## 2019-01-17 DIAGNOSIS — I50.9: ICD-10-CM

## 2019-01-17 DIAGNOSIS — Z95.810: ICD-10-CM

## 2019-01-17 DIAGNOSIS — Z95.5: ICD-10-CM

## 2019-01-17 PROCEDURE — 66984 XCAPSL CTRC RMVL W/O ECP: CPT

## 2019-01-17 PROCEDURE — 08RJ3JZ REPLACEMENT OF RIGHT LENS WITH SYNTHETIC SUBSTITUTE, PERCUTANEOUS APPROACH: ICD-10-PCS | Performed by: OPHTHALMOLOGY
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
## 2019-01-17 NOTE — ANESTHESIA
Pre-Anesthesia VS, & Labs





- Diagnosis





senile combined cataract





- Procedure





extraction cataract with lens implant


Vital Signs: 





                                        











Temp Pulse Resp BP Pulse Ox


 


 36.2 C L  78   20   114/71   98 


 


 01/17/19 07:05  01/17/19 07:05  01/17/19 07:05  01/17/19 07:05  01/17/19 07:05














                                        





Height                           5 ft 11 in


Weight (kg)                      117.8 kg


Body Mass Index                  35.0











- NPO


>8 hours





Home Medications and Allergies





                                        





Pantoprazole [Protonix] 40 mg PO BIDAC 11/11/14 


Spironolactone 12.5 mg PO DAILY 11/11/14 


Zolpidem Tartrate 10 mg PO QPM PRN 11/11/14 


raNITIdine HCl [Ranitidine HCl] 300 mg PO QPM 11/11/14 


Insulin Lispro [Humalog] 1 - 11 units SQ .SLIDING SCALE 12/14/16 


Pregabalin [Lyrica] 300 mg PO BID 12/14/16 


Nortriptyline HCl 20 mg PO 0800,1700 05/08/17 


Alprazolam 0.25 mg PO BID PRN 06/14/17 


Cetirizine [ZyrTEC] 10 mg PO DAILY 06/14/17 


Diclofenac Sodium [Voltaren] 4 gm TP QID PRN 06/14/17 


Lidocaine Patch 5% [Lidoderm Patch] 1 each TOP DAILY PRN 06/14/17 


Simvastatin 80 mg PO QPM 06/14/17 


Furosemide 40 mg PO DAILY 03/28/18 


Nitroglycerin [Nitrostat] 1 tab SL PRN PRN 03/28/18 


Pramlintide Acetate [Symlinpen 60] 15 mcg SQ AC 03/28/18 


Doxazosin [Cardura] 8 mg PO DAILY 06/17/18 


Isosorbide Mononitrate [Isosorbide Mononitrate ER] 30 mg PO DAILY 08/01/18 


Gabapentin 300 mg PO BID 12/13/18 


Albuterol 2.5 mg INH Q6H PRN 01/14/19 


Beclomethasone Dipropionate [Qvar Redihaler (40 mcg)] 1 puffs INH BID 01/14/19 


Bupropion HCl [Bupropion Xl] 450 mg PO DAILY 01/14/19 


FLUoxetine [PROzac] 10 mg PO DAILY 01/14/19 


Hydrocortisone 5 mg PO BIDWM 01/14/19 


Levalbuterol Tartrate [Levalbuterol Tartrate Hfa] 2 puffs INH Q4H PRN MDD 12 

puffs/day 01/14/19 


Lisinopril 5 mg PO DAILY 01/14/19 


Metoprolol Succinate 25 mg PO DAILY 01/14/19 


Multivitamin [Multiple Vitamins] 1 each PO DAILY 01/14/19 


Tiotropium Bromide [Spiriva Respimat] 2 puffs INH DAILY 01/14/19 


oxyCODONE [Roxicodone] 5 mg PO Q6H PRN 01/14/19 








Allergies/Adverse Reactions: 


                                    Allergies











Allergy/AdvReac Type Severity Reaction Status Date / Time


 


No Known Drug Allergies Allergy   Verified 12/15/18 10:46














Anes History & Medical History





- Anesthetic History


Anesthesia Complications: reports: No previous complications


Family history of Anesthesia Complications: Denies


Family history of Malignant Hyperthermia: Denies





- Medical History


Cardiovascular: reports: Congestive heart failure, Hypertension, High 

cholesterol, Coronary artery disease, MI


Pulmonary: reports: COPD, Shortness of breath


Gastrointestinal: reports: GERD, Chronic diarrhea


Urinary: reports: Benign prostate hypertrophy, Renal insuffiency


Neuro: reports: None, Peripheral neuropathy


Musculoskeletal: reports: Osteoarthritis


Endocrine/Autoimmune: reports: Type 1 diabetes


Blood Disorders: reports: None


Skin: reports: None


Smoking Status: Former smoker





- Surgical History


General: Colonoscopy, EGD


Eyes Ears Nose Throat (EENT): Cataracts


Cardiothoracic: Coronary stent, AICD, Cardiac catheterization


Urologic: Prostatic surgery, Testicular surgery


Gynecologic: 


Orthopedic: Carpal Tunnel surgery





Exam


General: Alert, Oriented x3


Dental: Other (bridge)


Mouth Opening: 3 Fingerbreadth


Neck Mobility: Normal


Mallampati classification: III


Thyromental Distance: greater than 6 cm


Respiratory: Decreased breath sounds


Cardiovascular: Normal S1, Normal S2


Mental/Cognitive Status: Alert/Oriented X3, Normal for patient


Cognitive Status: Within normal limits





Plan


Anesthesia Type: MAC


Consent for Procedure(s) Verified and Reviewed: No


Code Status: Attempt Resuscitation


ASA classification: 3-Severe systemic disease


Is this case an emergency?: No

## 2019-01-26 ENCOUNTER — HOSPITAL ENCOUNTER (EMERGENCY)
Dept: HOSPITAL 76 - ED | Age: 59
Discharge: HOME | End: 2019-01-26
Payer: MEDICARE

## 2019-01-26 VITALS — SYSTOLIC BLOOD PRESSURE: 105 MMHG | DIASTOLIC BLOOD PRESSURE: 59 MMHG

## 2019-01-26 DIAGNOSIS — Z87.891: ICD-10-CM

## 2019-01-26 DIAGNOSIS — S09.90XA: Primary | ICD-10-CM

## 2019-01-26 DIAGNOSIS — E78.00: ICD-10-CM

## 2019-01-26 DIAGNOSIS — Z95.5: ICD-10-CM

## 2019-01-26 DIAGNOSIS — I50.9: ICD-10-CM

## 2019-01-26 DIAGNOSIS — I11.0: ICD-10-CM

## 2019-01-26 DIAGNOSIS — E10.42: ICD-10-CM

## 2019-01-26 DIAGNOSIS — S30.0XXA: ICD-10-CM

## 2019-01-26 DIAGNOSIS — Z79.82: ICD-10-CM

## 2019-01-26 DIAGNOSIS — Z95.810: ICD-10-CM
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
## 2019-01-26 PROCEDURE — 72220 X-RAY EXAM SACRUM TAILBONE: CPT

## 2019-01-26 PROCEDURE — 99283 EMERGENCY DEPT VISIT LOW MDM: CPT

## 2019-01-26 PROCEDURE — 70450 CT HEAD/BRAIN W/O DYE: CPT

## 2019-01-26 NOTE — CT REPORT
Reason:  fall, head injury, altered mental status

Procedure Date:  01/26/2019   

Accession Number:  [id number] / [id number]                    

Procedure:  CT  - Head W/O CPT Code:  

 

FULL RESULT:

 

 

EXAM:

CT HEAD

 

EXAM DATE: 1/26/2019 08:36 PM.

 

CLINICAL HISTORY: 58-year-old male. Fall, head injury, altered mental 

status.

 

COMPARISON: CT head 01/13/2019.

 

TECHNIQUE: Multiaxial CT images were obtained from the foramen magnum to 

the vertex. Reformats: Sagittal and coronal. IV contrast: None.

 

In accordance with CT protocol optimization, one or more of the following 

dose reduction techniques were utilized for this exam: automated exposure 

control, adjustment of mA and/or KV based on patient size, or use of 

iterative reconstructive technique.

 

FINDINGS:

Parenchyma: No intraparenchymal hemorrhage. No evidence of mass, midline 

shift, or CT findings of infarction. Gray-white differentiation is 

distinct.

 

Extraaxial Spaces: Normal for age. No subdural or epidural collections 

identified.

 

Ventricles: Normal in size and position.

 

Sinuses and Orbits: Status post bilateral lens replacement surgery. 

Imaged paranasal sinuses, orbits, and mastoids show no significant 

abnormality.

 

Bones: No evidence of fracture or calvarial defect.

 

Other: None.

IMPRESSION: No CT evidence of acute intracranial abnormality, 

specifically no CT evidence of acute infarct, intracranial hemorrhage, 

mass effect, midline shift, or hydrocephalus.

 

[facility]

## 2019-01-26 NOTE — ED PHYSICIAN DOCUMENTATION
History of Present Illness





- Stated complaint


Stated Complaint: FALL/HD INJ/DOUBLE VISION





- Chief complaint


Chief Complaint: Neuro





- History obtained from


History obtained from: Patient





- History of Present Illness


Timing: Today, How many hours ago (12)


Pain level max: 8


Pain level now: 5


Improved by: nothing


Worsened by: nothing





- Additonal information


Additional information: 





58-year-old gentleman presents to the emergency department after tripping and 

falling on a rug in the bathroom. He struck his head against a wall and fell 

backwards onto the toilet striking his coccyx.  Now states he has pain with 

sitting and walking.  He also states that he feels like his vision is different.

States currently feeling better





Review of Systems


Ten Systems: 10 systems reviewed and negative


Constitutional: denies: Fever, Chills


Ears: denies: Ear pain


Nose: denies: Rhinorrhea / runny nose, Congestion


Cardiac: denies: Chest pain / pressure


Respiratory: denies: Cough


GI: denies: Abdominal Pain, Nausea, Vomiting, Diarrhea


Skin: denies: Rash


Musculoskeletal: denies: Neck pain


Neurologic: denies: Focal weakness, Numbness





PD PAST MEDICAL HISTORY





- Past Medical History


Cardiovascular: Congestive heart failure, Hypertension, High cholesterol, 

Coronary artery disease, MI, Other


Respiratory: COPD, Shortness of breath, Other


Neuro: None, Peripheral neuropathy


Endocrine/Autoimmune: Type 1 diabetes, Other


GI: GERD, Chronic diarrhea, Other


: Benign prostate hypertrophy, Renal insuffiency


HEENT: Other


Psych: Depression, Anxiety, Panic attacks, Post traumatic stress disorder


Musculoskeletal: Osteoarthritis


Derm: None





- Past Surgical History


Past Surgical History: Yes


General: Colonoscopy, EGD


Ortho: Carpal Tunnel surgery


/GYN: Other


Cardiovascular: Coronary stent, AICD, Cardiac catheterization


HEENT: Cataracts, Other





- Present Medications


Home Medications: 


                                Ambulatory Orders











 Medication  Instructions  Recorded  Confirmed


 


Pantoprazole [Protonix] 40 mg PO BIDAC 11/11/14 01/17/19


 


Spironolactone 12.5 mg PO DAILY 11/11/14 01/17/19


 


Zolpidem Tartrate 10 mg PO QPM PRN 11/11/14 01/17/19


 


raNITIdine HCl [Ranitidine HCl] 300 mg PO QPM 11/11/14 01/17/19


 


Insulin Lispro [Humalog] 1 - 11 units SQ .SLIDING SCALE 12/14/16 01/17/19


 


Pregabalin [Lyrica] 300 mg PO BID 12/14/16 01/17/19


 


Nortriptyline HCl 20 mg PO 0800,1700 05/08/17 01/17/19


 


Alprazolam 0.25 mg PO BID PRN 06/14/17 01/17/19


 


Cetirizine [ZyrTEC] 10 mg PO DAILY 06/14/17 01/17/19


 


Diclofenac Sodium [Voltaren] 4 gm TP QID PRN 06/14/17 01/17/19


 


Lidocaine Patch 5% [Lidoderm Patch] 1 each TOP DAILY PRN 06/14/17 01/17/19


 


Simvastatin 80 mg PO QPM 06/14/17 01/17/19


 


Budesonide [Entocort EC] 9 mg PO DAILY #60 capsule 06/17/17 01/17/19


 


Psyllium [Metamucil] 1 packet PO BID  packet 06/17/17 01/17/19


 


Aspirin Chewable [St Kw[place]wo 81 mg PO DAILY  tablet 08/06/17 01/17/19





Aspirin]   


 


DULoxetine [Cymbalta] 60 mg PO BID  capsule 08/06/17 01/17/19


 


Furosemide 40 mg PO DAILY 03/28/18 01/17/19


 


Nitroglycerin [Nitrostat] 1 tab SL PRN PRN 03/28/18 01/17/19


 


Pramlintide Acetate [Symlinpen 60] 15 mcg SQ AC 03/28/18 01/17/19


 


Doxazosin [Cardura] 8 mg PO DAILY 06/17/18 01/17/19


 


Isosorbide Mononitrate [Isosorbide 30 mg PO DAILY 08/01/18 01/17/19





Mononitrate ER]   


 


Gabapentin 300 mg PO BID 12/13/18 01/17/19


 


Albuterol 2.5 mg INH Q6H PRN 01/14/19 01/17/19


 


Beclomethasone Dipropionate [Qvar 1 puffs INH BID 01/14/19 01/17/19





Redihaler (40 mcg)]   


 


Bupropion HCl [Bupropion Xl] 450 mg PO DAILY 01/14/19 01/17/19


 


FLUoxetine [PROzac] 10 mg PO DAILY 01/14/19 01/17/19


 


Hydrocortisone 5 mg PO BIDWM 01/14/19 01/17/19


 


Levalbuterol Tartrate 2 puffs INH Q4H PRN MDD 12 01/14/19 01/17/19





[Levalbuterol Tartrate Hfa] puffs/day  


 


Lisinopril 5 mg PO DAILY 01/14/19 01/17/19


 


Metoprolol Succinate 25 mg PO DAILY 01/14/19 01/17/19


 


Multivitamin [Multiple Vitamins] 1 each PO DAILY 01/14/19 01/17/19


 


Tiotropium Bromide [Spiriva 2 puffs INH DAILY 01/14/19 01/17/19





Respimat]   


 


oxyCODONE [Roxicodone] 5 mg PO Q6H PRN 01/14/19 01/17/19














- Allergies


Allergies/Adverse Reactions: 


                                    Allergies











Allergy/AdvReac Type Severity Reaction Status Date / Time


 


No Known Drug Allergies Allergy   Verified 01/26/19 19:52














- Social History


Does the pt smoke?: No


Smoking Status: Former smoker


Does the pt drink ETOH?: No


Does the pt have substance abuse?: No





- Immunizations


Immunizations are current?: Yes





- POLST


Patient has POLST: No


POLST Status: Patient was in the process of completing an advanced directive but

 has not finished





PD ED PE NORMAL





- Vitals


Vital signs reviewed: Yes





- General


General: Alert and oriented X 3, No acute distress, Well developed/nourished





- HEENT


HEENT: Atraumatic, PERRL, Ears normal, Moist mucous membranes





- Neck


Neck: Supple, no meningeal sign, No bony TTP





- Cardiac


Cardiac: RRR, Strong equal pulses





- Respiratory


Respiratory: No respiratory distress, Clear bilaterally





- Abdomen


Abdomen: Soft, Non tender, Non distended





- Back


Back: No CVA TTP, No spinal TTP, Other (TTP over sacrum and coccyx)





- Derm


Derm: Warm and dry, No rash





- Extremities


Extremities: No edema, No calf tenderness / cord





- Neuro


Neuro: Alert and oriented X 3





- Psych


Psych: Normal mood, Normal affect





Results





- Vitals


Vitals: 


                               Vital Signs - 24 hr











  01/26/19 01/26/19





  19:48 21:30


 


Temperature 36.2 C L 37.2 C


 


Heart Rate 91 83


 


Respiratory 18 18





Rate  


 


Blood Pressure 113/79 105/59 L


 


O2 Saturation 95 96








                                     Oxygen











O2 Source [With Activity]      Room air


 


O2 Source                      Room air

















- Rads (name of study)


  ** head ct


Radiology: Prelim report reviewed, EMP read contemporaneously, See rad report 

(No CT evidence of acute intracranial abnormality, specifically no CT evidence 

of acute infarct, intracranial hemorrhage, mass effect, midline shift, or 

hydrocephalus. )





  ** sacrum xray


Radiology: Prelim report reviewed, EMP read contemporaneously, See rad report 

(Normal sacrum and coccyx radiography. )





PD MEDICAL DECISION MAKING





- ED course


Complexity details: reviewed results, re-evaluated patient, considered d

ifferential, d/w patient


ED course: 





Patient is a 58-year-old male who presents to the emergency department after a 

fall earlier today, striking his head.  Negative head CT.  He also appears to 

have a contusion of the sacrum and coccyx.  Ambulating well.  We will continue 

supportive care and follow-up with his doctor.  Head injury instructions given 

at bedside.  Patient counseled regarding signs and symptoms for which I believe 

and urgent re-evaluation would be necessary. Patient with good understanding of 

and agreement to plan and is comfortable going home at this time





This document was made in part using voice recognition software. While efforts 

are made to proofread this document, sound alike and grammatical errors may 

occur.





Departure





- Departure


Disposition: 01 Home, Self Care


Clinical Impression: 


Head injury, closed


Qualifiers:


 Encounter type: initial encounter Qualified Code(s): S09.90XA - Unspecified 

injury of head, initial encounter





Sacral contusion


Qualifiers:


 Encounter type: initial encounter Qualified Code(s): S30.0XXA - Contusion of 

lower back and pelvis, initial encounter





Condition: Good


Instructions:  ED Contusion Sacrum Coccyx, ED Head Injury Closed


Follow-Up: 


Leo Ray MD [Primary Care Provider] - Within 1 week


Comments: 


Your head CT and x-ray are normal today.  Return if you worsen.  This should 

improve over the next few days.


Discharge Date/Time: 01/26/19 21:38

## 2019-01-26 NOTE — XRAY REPORT
Reason:  fall, tailbone pain

Procedure Date:  01/26/2019   

Accession Number:  [id number] / [id number]                    

Procedure:  XR  - Sacrum/Coccyx CPT Code:  

 

FULL RESULT:

 

 

EXAM:

SACRUM AND COCCYX RADIOGRAPHY

 

EXAM DATE: 1/26/2019 08:54 PM.

 

HISTORY: Fall. Tailbone pain.

 

COMPARISONS: None.

 

TECHNIQUE: 3 views.

 

FINDINGS:

Alignment: Normal. The sacrum and coccyx are normally aligned.

 

Bones: Normal. No fracture or bone lesion.

 

Joints: Normal. The sacroiliac joints and visualized hips are within 

normal limits.

 

Soft Tissues: Unremarkable.

IMPRESSION: Normal sacrum and coccyx radiography.

 

[facility]

## 2019-01-30 ENCOUNTER — HOSPITAL ENCOUNTER (OUTPATIENT)
Dept: HOSPITAL 76 - EMS | Age: 59
Discharge: ANOTHER HOSPITAL | End: 2019-01-30
Attending: SURGERY
Payer: MEDICARE

## 2019-01-30 ENCOUNTER — HOSPITAL ENCOUNTER (INPATIENT)
Dept: HOSPITAL 76 - ED | Age: 59
LOS: 2 days | Discharge: ANOTHER HOSPITAL | DRG: 872 | End: 2019-02-01
Attending: INTERNAL MEDICINE | Admitting: INTERNAL MEDICINE
Payer: MEDICARE

## 2019-01-30 DIAGNOSIS — E66.9: ICD-10-CM

## 2019-01-30 DIAGNOSIS — K21.9: ICD-10-CM

## 2019-01-30 DIAGNOSIS — I50.9: ICD-10-CM

## 2019-01-30 DIAGNOSIS — I25.2: ICD-10-CM

## 2019-01-30 DIAGNOSIS — R50.9: ICD-10-CM

## 2019-01-30 DIAGNOSIS — I25.10: ICD-10-CM

## 2019-01-30 DIAGNOSIS — E10.65: ICD-10-CM

## 2019-01-30 DIAGNOSIS — J44.1: ICD-10-CM

## 2019-01-30 DIAGNOSIS — F41.9: ICD-10-CM

## 2019-01-30 DIAGNOSIS — E10.621: ICD-10-CM

## 2019-01-30 DIAGNOSIS — I11.0: ICD-10-CM

## 2019-01-30 DIAGNOSIS — N28.9: ICD-10-CM

## 2019-01-30 DIAGNOSIS — N40.1: ICD-10-CM

## 2019-01-30 DIAGNOSIS — E27.1: ICD-10-CM

## 2019-01-30 DIAGNOSIS — E78.5: ICD-10-CM

## 2019-01-30 DIAGNOSIS — A41.9: Primary | ICD-10-CM

## 2019-01-30 DIAGNOSIS — R00.0: ICD-10-CM

## 2019-01-30 DIAGNOSIS — K52.9: ICD-10-CM

## 2019-01-30 DIAGNOSIS — Z79.82: ICD-10-CM

## 2019-01-30 DIAGNOSIS — G47.00: ICD-10-CM

## 2019-01-30 DIAGNOSIS — Z87.891: ICD-10-CM

## 2019-01-30 DIAGNOSIS — R06.00: ICD-10-CM

## 2019-01-30 DIAGNOSIS — E10.42: ICD-10-CM

## 2019-01-30 DIAGNOSIS — Z95.810: ICD-10-CM

## 2019-01-30 DIAGNOSIS — F41.0: ICD-10-CM

## 2019-01-30 DIAGNOSIS — R07.9: Primary | ICD-10-CM

## 2019-01-30 DIAGNOSIS — M19.90: ICD-10-CM

## 2019-01-30 DIAGNOSIS — F32.9: ICD-10-CM

## 2019-01-30 DIAGNOSIS — I25.5: ICD-10-CM

## 2019-01-30 DIAGNOSIS — Z96.41: ICD-10-CM

## 2019-01-30 DIAGNOSIS — Z95.5: ICD-10-CM

## 2019-01-30 DIAGNOSIS — R33.8: ICD-10-CM

## 2019-01-30 DIAGNOSIS — H66.90: ICD-10-CM

## 2019-01-30 DIAGNOSIS — E78.00: ICD-10-CM

## 2019-01-30 DIAGNOSIS — R09.02: ICD-10-CM

## 2019-01-30 DIAGNOSIS — J06.9: ICD-10-CM

## 2019-01-30 DIAGNOSIS — Z79.4: ICD-10-CM

## 2019-01-30 DIAGNOSIS — L97.413: ICD-10-CM

## 2019-01-30 DIAGNOSIS — F43.10: ICD-10-CM

## 2019-01-30 PROCEDURE — 83735 ASSAY OF MAGNESIUM: CPT

## 2019-01-30 PROCEDURE — 80053 COMPREHEN METABOLIC PANEL: CPT

## 2019-01-30 PROCEDURE — 85025 COMPLETE CBC W/AUTO DIFF WBC: CPT

## 2019-01-30 PROCEDURE — 87040 BLOOD CULTURE FOR BACTERIA: CPT

## 2019-01-30 PROCEDURE — 94640 AIRWAY INHALATION TREATMENT: CPT

## 2019-01-30 PROCEDURE — 87181 SC STD AGAR DILUTION PER AGT: CPT

## 2019-01-30 PROCEDURE — 87275 INFLUENZA B AG IF: CPT

## 2019-01-30 PROCEDURE — 83880 ASSAY OF NATRIURETIC PEPTIDE: CPT

## 2019-01-30 PROCEDURE — 70470 CT HEAD/BRAIN W/O & W/DYE: CPT

## 2019-01-30 PROCEDURE — 93005 ELECTROCARDIOGRAM TRACING: CPT

## 2019-01-30 PROCEDURE — 83690 ASSAY OF LIPASE: CPT

## 2019-01-30 PROCEDURE — 87276 INFLUENZA A AG IF: CPT

## 2019-01-30 PROCEDURE — 96374 THER/PROPH/DIAG INJ IV PUSH: CPT

## 2019-01-30 PROCEDURE — 84484 ASSAY OF TROPONIN QUANT: CPT

## 2019-01-30 PROCEDURE — 99284 EMERGENCY DEPT VISIT MOD MDM: CPT

## 2019-01-30 PROCEDURE — 83036 HEMOGLOBIN GLYCOSYLATED A1C: CPT

## 2019-01-30 PROCEDURE — 83605 ASSAY OF LACTIC ACID: CPT

## 2019-01-30 PROCEDURE — 80048 BASIC METABOLIC PNL TOTAL CA: CPT

## 2019-01-30 PROCEDURE — 36415 COLL VENOUS BLD VENIPUNCTURE: CPT

## 2019-01-30 PROCEDURE — 71046 X-RAY EXAM CHEST 2 VIEWS: CPT

## 2019-01-30 PROCEDURE — 82009 KETONE BODYS QUAL: CPT

## 2019-01-30 NOTE — ED PHYSICIAN DOCUMENTATION
PD HPI CHEST PAIN





- Stated complaint


Stated Complaint: SOSIRI CP





- History obtained from


History obtained from: Patient, EMS





- History of Present Illness


Timing - onset: How many days ago (2-3)


Timing - onset during: Rest, Light activity


Timing - duration: Days (2-3 days of Increasing dyspnea wheezing and cough.  He 

has had general malaise and aches with some congestion.  He started with a hi

gher fever today and noticed dyspnea with even just walking around the house.  

He does have inhalers at home.  He states he has a home nebulizer with some 

albuterol for it but did not use that today.  He does not normally use nebulizer

nor oxygen.  He does do daily inhalers.  He had had increased wheezing and was 

feeling markedly short of breath so called EMS.  They noted him to be hypoxic in

the 86-90% range on room air with work of breathing and wheezing.  They gave him

nebulizer treatments en route with improvement.  He arrives to the ER with 

tachycardia and fever.  His oxygenation is at 92% on room air.  He is able to 

speak in sentences.)


Timing - details: Gradual onset, Still present


Quality: Tightness.  No: Sharp, Pain


Location: Substernal


Improved by: Other (inhaler at home and nebulizer by EMS)


Worsened by: Exertion, Other (coughing)


Associated symptoms: Shortness of air, General Weakness, Cough.  No: 

Diaphoresis, Nausea, Vomiting, Feeling faint / dizzy, Palpitations


Similar symptoms before: Diagnosis (COPD and pneumonia, has history of CHF and 

MI in the past as well.)


Recently seen: Admitted (about 2 weeks ago for altered mentation that improved 

overnight. Thought med related.)





Review of Systems


Ten Systems: 10 systems reviewed and negative


Constitutional: reports: Fever (for couple days), Chills, Myalgias


Nose: reports: Congestion.  denies: Rhinorrhea / runny nose


Throat: denies: Sore throat


Cardiac: reports: Chest pain / pressure, Pedal edema (mild chronic).  denies: 

Palpitations, Calf pain


Respiratory: reports: Dyspnea, Cough, Wheezing


GI: reports: Nausea.  denies: Abdominal Pain, Vomiting, Diarrhea


: denies: Dysuria, Frequency


Musculoskeletal: reports: Extremity swelling.  denies: Back pain


Neurologic: reports: Generalized weakness.  denies: Focal weakness, Numbness, 

Near syncope





PD PAST MEDICAL HISTORY





- Past Medical History


Cardiovascular: Congestive heart failure, Hypertension, High cholesterol, 

Coronary artery disease, MI, Other


Respiratory: COPD, Shortness of breath, Other


Neuro: None, Peripheral neuropathy


Endocrine/Autoimmune: Type 1 diabetes, Other


GI: GERD, Chronic diarrhea, Other


: Benign prostate hypertrophy, Renal insuffiency


HEENT: Other


Psych: Depression, Anxiety, Panic attacks, Post traumatic stress disorder


Musculoskeletal: Osteoarthritis


Derm: None





- Past Surgical History


Past Surgical History: Yes


General: Colonoscopy, EGD


Ortho: Carpal Tunnel surgery


/GYN: Other


Cardiovascular: Coronary stent, AICD, Cardiac catheterization


HEENT: Cataracts, Other





- Present Medications


Home Medications: 


                                Ambulatory Orders











 Medication  Instructions  Recorded  Confirmed


 


Pantoprazole [Protonix] 40 mg PO BIDAC 11/11/14 01/17/19


 


Spironolactone 12.5 mg PO DAILY 11/11/14 01/17/19


 


Zolpidem Tartrate 10 mg PO QPM PRN 11/11/14 01/17/19


 


raNITIdine HCl [Ranitidine HCl] 300 mg PO QPM 11/11/14 01/17/19


 


Insulin Lispro [Humalog] 1 - 11 units SQ .SLIDING SCALE 12/14/16 01/17/19


 


Pregabalin [Lyrica] 300 mg PO BID 12/14/16 01/17/19


 


Nortriptyline HCl 20 mg PO 0800,1700 05/08/17 01/17/19


 


Alprazolam 0.25 mg PO BID PRN 06/14/17 01/17/19


 


Cetirizine [ZyrTEC] 10 mg PO DAILY 06/14/17 01/17/19


 


Diclofenac Sodium [Voltaren] 4 gm TP QID PRN 06/14/17 01/17/19


 


Lidocaine Patch 5% [Lidoderm Patch] 1 each TOP DAILY PRN 06/14/17 01/17/19


 


Simvastatin 80 mg PO QPM 06/14/17 01/17/19


 


Budesonide [Entocort EC] 9 mg PO DAILY #60 capsule 06/17/17 01/17/19


 


Psyllium [Metamucil] 1 packet PO BID  packet 06/17/17 01/17/19


 


Aspirin Chewable [St Kw[place]wo 81 mg PO DAILY  tablet 08/06/17 01/17/19





Aspirin]   


 


DULoxetine [Cymbalta] 60 mg PO BID  capsule 08/06/17 01/17/19


 


Furosemide 40 mg PO DAILY 03/28/18 01/17/19


 


Nitroglycerin [Nitrostat] 1 tab SL PRN PRN 03/28/18 01/17/19


 


Pramlintide Acetate [Symlinpen 60] 15 mcg SQ AC 03/28/18 01/17/19


 


Doxazosin [Cardura] 8 mg PO DAILY 06/17/18 01/17/19


 


Isosorbide Mononitrate [Isosorbide 30 mg PO DAILY 08/01/18 01/17/19





Mononitrate ER]   


 


Gabapentin 300 mg PO BID 12/13/18 01/17/19


 


Albuterol 2.5 mg INH Q6H PRN 01/14/19 01/17/19


 


Beclomethasone Dipropionate [Qvar 1 puffs INH BID 01/14/19 01/17/19





Redihaler (40 mcg)]   


 


Bupropion HCl [Bupropion Xl] 450 mg PO DAILY 01/14/19 01/17/19


 


FLUoxetine [PROzac] 10 mg PO DAILY 01/14/19 01/17/19


 


Hydrocortisone 5 mg PO BIDWM 01/14/19 01/17/19


 


Levalbuterol Tartrate 2 puffs INH Q4H PRN MDD 12 01/14/19 01/17/19





[Levalbuterol Tartrate Hfa] puffs/day  


 


Lisinopril 5 mg PO DAILY 01/14/19 01/17/19


 


Metoprolol Succinate 25 mg PO DAILY 01/14/19 01/17/19


 


Multivitamin [Multiple Vitamins] 1 each PO DAILY 01/14/19 01/17/19


 


Tiotropium Bromide [Spiriva 2 puffs INH DAILY 01/14/19 01/17/19





Respimat]   


 


oxyCODONE [Roxicodone] 5 mg PO Q6H PRN 01/14/19 01/17/19














- Allergies


Allergies/Adverse Reactions: 


                                    Allergies











Allergy/AdvReac Type Severity Reaction Status Date / Time


 


No Known Drug Allergies Allergy   Verified 01/30/19 23:28














- Social History


Does the pt smoke?: No


Smoking Status: Former smoker


Does the pt drink ETOH?: No


Does the pt have substance abuse?: No





- Immunizations


Immunizations are current?: Yes





- POLST


Patient has POLST: No


POLST Status: Patient was in the process of completing an advanced directive but

 has not finished





PD ED PE NORMAL





- Vitals


Vital signs reviewed: Yes





- General


General: Alert and oriented X 3, Well developed/nourished, Other (able to talk 

in sentences, but does have wheezing on arrival. Had gotten nebs enroute. )





- HEENT


HEENT: Ears normal, Pharynx benign





- Neck


Neck: Supple, no meningeal sign, No adenopathy





- Cardiac


Cardiac: No murmur.  No: RRR (tachycardic but regular)





- Respiratory


Respiratory: No: Clear bilaterally (disffuse moderate wheezing; no crackles nor 

coarse sounds. )





- Abdomen


Abdomen: Soft, Non tender





- Male 


Male : Deferred





- Rectal


Rectal: Deferred





- Back


Back: No CVA TTP





- Derm


Derm: Normal color





- Extremities


Extremities: No deformity, No tenderness to palpate, Normal ROM s pain, No calf 

tenderness / cord, Other (mild pitting edema in both ankles, not up the shins. )





- Neuro


Neuro: Alert and oriented X 3, No motor deficit, Normal speech


Eye Opening: Spontaneous


Motor: Obeys Commands


Verbal: Oriented


GCS Score: 15





- Psych


Psych: Normal mood





Results





- Vitals


Vitals: 


                               Vital Signs - 24 hr











  01/30/19 01/30/19 01/31/19





  23:29 23:33 00:01


 


Temperature 39.5 C H  


 


Heart Rate 120 H 119 H 120 H


 


Respiratory 16 20 21





Rate   


 


Blood Pressure 120/86 H 123/74 


 


O2 Saturation 92 92 














  01/31/19





  00:15


 


Temperature 37.8 C H


 


Heart Rate 


 


Respiratory 





Rate 


 


Blood Pressure 


 


O2 Saturation 








                                     Oxygen











O2 Source [With Activity]      Room air


 


O2 Source                      Room air

















- EKG (time done)


  ** 23:18


Rate: Rate (enter#) (118)


Rhythm: Sinus tachycardia


Axis: Normal


Intervals: Normal PA


QRS: Normal


Ischemia: Normal ST segments.  No: ST elevation c/w ischemia, ST depression





- Labs


Labs: 


                                Laboratory Tests











  01/30/19 01/30/19 01/30/19





  23:55 23:55 23:55


 


WBC  13.8 H  


 


RBC  4.08 L  


 


Hgb  12.5 L  


 


Hct  37.0 L  


 


MCV  90.5  


 


MCH  30.5  


 


MCHC  33.7  


 


RDW  14.7  


 


Plt Count  225  


 


MPV  8.3  


 


Neut # (Auto)  12.2 H  


 


Lymph # (Auto)  0.5 L  


 


Mono # (Auto)  1.0  


 


Eos # (Auto)  0.0  


 


Baso # (Auto)  0.1  


 


Absolute Nucleated RBC  0.01  


 


Nucleated RBC %  0.0  


 


Sodium   132 L 


 


Potassium   4.5 


 


Chloride   90 L 


 


Carbon Dioxide   31 


 


Anion Gap   11.0 


 


BUN   15 


 


Creatinine   1.2 


 


Estimated GFR (MDRD)   62 L 


 


Glucose   434 H 


 


Lactic Acid   


 


Calcium   8.8 


 


Magnesium   1.9 


 


Total Bilirubin   0.7 


 


AST   30 


 


ALT   39 


 


Alkaline Phosphatase   118 


 


Troponin I    < 0.04


 


B-Natriuretic Peptide   


 


Total Protein   6.9 


 


Albumin   3.5 


 


Globulin   3.4 


 


Albumin/Globulin Ratio   1.0 


 


Lipase   17 L 


 


Serum Ketones   NEGATIVE 


 


Influenza A (Rapid)   


 


Influenza B (Rapid)   














  01/30/19 01/30/19 01/30/19





  23:55 23:55 23:55


 


WBC   


 


RBC   


 


Hgb   


 


Hct   


 


MCV   


 


MCH   


 


MCHC   


 


RDW   


 


Plt Count   


 


MPV   


 


Neut # (Auto)   


 


Lymph # (Auto)   


 


Mono # (Auto)   


 


Eos # (Auto)   


 


Baso # (Auto)   


 


Absolute Nucleated RBC   


 


Nucleated RBC %   


 


Sodium   


 


Potassium   


 


Chloride   


 


Carbon Dioxide   


 


Anion Gap   


 


BUN   


 


Creatinine   


 


Estimated GFR (MDRD)   


 


Glucose   


 


Lactic Acid   1.3 


 


Calcium   


 


Magnesium   


 


Total Bilirubin   


 


AST   


 


ALT   


 


Alkaline Phosphatase   


 


Troponin I   


 


B-Natriuretic Peptide  61  


 


Total Protein   


 


Albumin   


 


Globulin   


 


Albumin/Globulin Ratio   


 


Lipase   


 


Serum Ketones   


 


Influenza A (Rapid)    Negative


 


Influenza B (Rapid)    Negative














- Rads (name of study)


  ** chest xray


Radiology: Prelim report reviewed (no infiltrates; no acute cardiopulmonary 

abnormality.), EMP read contemporaneously, See rad report





PD MEDICAL DECISION MAKING





- ED course


Complexity details: reviewed results, re-evaluated patient (resting easily and 

alert. However still resting tachycardia, and sats 90-92% RA, with drop to 88% 

at times with just conversation. Did not attempt ambulation. ), considered 

differential (He is breathing more easily after nebulizer treatments en route 

and here in the ER.  He is awake and conversant.  His resting heart rate is 

still tachycardic however.  His temperature has come down from 39.5-38.  His 

oxygenation level on room air is 90-92% resting but we are just talking regular 

conversation and he was going down to 88%.  I did not attempt ambulation.  I 

think his Sirs criteria and respiratory status are improved but not quite 

adequate for home therapy given still mild hypoxia tachycardia and some 

wheezing.  There is no signs of pneumonia, influenza, sepsis or heart failure.  

However he does have the exacerbation of COPD with either likely viral illness 

or would want to potentially cover for bacterial bronchitis given his history.),

d/w patient





Departure





- Departure


Disposition: ED Place in Observation


Clinical Impression: 


 Hypoxia, Acute exacerbation of COPD with asthma





Upper respiratory infection


Qualifiers:


 URI type: unspecified URI Qualified Code(s): J06.9 - Acute upper respiratory 

infection, unspecified





Clinical Impression: 


 (Ruled Out): Congestive heart failure


Condition: Stable


Record reviewed to determine appropriate education?: Yes

## 2019-01-31 LAB
ALBUMIN DIAFP-MCNC: 3.5 G/DL (ref 3.2–5.5)
ALBUMIN/GLOB SERPL: 1 {RATIO} (ref 1–2.2)
ALP SERPL-CCNC: 118 IU/L (ref 42–121)
ALT SERPL W P-5'-P-CCNC: 39 IU/L (ref 10–60)
ANION GAP SERPL CALCULATED.4IONS-SCNC: 11 MMOL/L (ref 6–13)
ANION GAP SERPL CALCULATED.4IONS-SCNC: 15 MMOL/L (ref 6–13)
AST SERPL W P-5'-P-CCNC: 30 IU/L (ref 10–42)
BASOPHILS NFR BLD AUTO: 0 10^3/UL (ref 0–0.1)
BASOPHILS NFR BLD AUTO: 0.1 %
BASOPHILS NFR BLD AUTO: 0.1 10^3/UL (ref 0–0.1)
BASOPHILS NFR BLD AUTO: 0.7 %
BILIRUB BLD-MCNC: 0.7 MG/DL (ref 0.2–1)
BUN SERPL-MCNC: 15 MG/DL (ref 6–20)
BUN SERPL-MCNC: 17 MG/DL (ref 6–20)
CALCIUM UR-MCNC: 8.8 MG/DL (ref 8.5–10.3)
CALCIUM UR-MCNC: 8.8 MG/DL (ref 8.5–10.3)
CHLORIDE SERPL-SCNC: 89 MMOL/L (ref 101–111)
CHLORIDE SERPL-SCNC: 90 MMOL/L (ref 101–111)
CO2 SERPL-SCNC: 27 MMOL/L (ref 21–32)
CO2 SERPL-SCNC: 31 MMOL/L (ref 21–32)
CREAT SERPLBLD-SCNC: 1.2 MG/DL (ref 0.6–1.2)
CREAT SERPLBLD-SCNC: 1.2 MG/DL (ref 0.6–1.2)
EOSINOPHIL # BLD AUTO: 0 10^3/UL (ref 0–0.7)
EOSINOPHIL # BLD AUTO: 0 10^3/UL (ref 0–0.7)
EOSINOPHIL NFR BLD AUTO: 0 %
EOSINOPHIL NFR BLD AUTO: 0.2 %
ERYTHROCYTE [DISTWIDTH] IN BLOOD BY AUTOMATED COUNT: 14.7 % (ref 12–15)
ERYTHROCYTE [DISTWIDTH] IN BLOOD BY AUTOMATED COUNT: 14.8 % (ref 12–15)
GFRSERPLBLD MDRD-ARVRAT: 62 ML/MIN/{1.73_M2} (ref 89–?)
GFRSERPLBLD MDRD-ARVRAT: 62 ML/MIN/{1.73_M2} (ref 89–?)
GLOBULIN SER-MCNC: 3.4 G/DL (ref 2.1–4.2)
GLUCOSE SERPL-MCNC: 434 MG/DL (ref 70–100)
GLUCOSE SERPL-MCNC: 458 MG/DL (ref 70–100)
HGB UR QL STRIP: 12.5 G/DL (ref 14–18)
HGB UR QL STRIP: 12.6 G/DL (ref 14–18)
KETONES SERPL STRIP-SCNC: NEGATIVE MMOL/L
LIPASE SERPL-CCNC: 17 U/L (ref 22–51)
LYMPHOCYTES # SPEC AUTO: 0.3 10^3/UL (ref 1.5–3.5)
LYMPHOCYTES # SPEC AUTO: 0.5 10^3/UL (ref 1.5–3.5)
LYMPHOCYTES NFR BLD AUTO: 1.8 %
LYMPHOCYTES NFR BLD AUTO: 3.8 %
MAGNESIUM SERPL-MCNC: 1.9 MG/DL (ref 1.7–2.8)
MCH RBC QN AUTO: 30.2 PG (ref 27–31)
MCH RBC QN AUTO: 30.5 PG (ref 27–31)
MCHC RBC AUTO-ENTMCNC: 32.9 G/DL (ref 32–36)
MCHC RBC AUTO-ENTMCNC: 33.7 G/DL (ref 32–36)
MCV RBC AUTO: 90.5 FL (ref 80–94)
MCV RBC AUTO: 91.6 FL (ref 80–94)
MONOCYTES # BLD AUTO: 0.3 10^3/UL (ref 0–1)
MONOCYTES # BLD AUTO: 1 10^3/UL (ref 0–1)
MONOCYTES NFR BLD AUTO: 2.2 %
MONOCYTES NFR BLD AUTO: 7 %
NEUTROPHILS # BLD AUTO: 12.2 10^3/UL (ref 1.5–6.6)
NEUTROPHILS # BLD AUTO: 14.1 10^3/UL (ref 1.5–6.6)
NEUTROPHILS # SNV AUTO: 13.8 X10^3/UL (ref 4.8–10.8)
NEUTROPHILS # SNV AUTO: 14.7 X10^3/UL (ref 4.8–10.8)
NEUTROPHILS NFR BLD AUTO: 88.3 %
NEUTROPHILS NFR BLD AUTO: 95.9 %
PDW BLD AUTO: 8.3 FL (ref 7.4–11.4)
PDW BLD AUTO: 8.4 FL (ref 7.4–11.4)
PLATELET # BLD: 221 10^3/UL (ref 130–450)
PLATELET # BLD: 225 10^3/UL (ref 130–450)
PROT SPEC-MCNC: 6.9 G/DL (ref 6.7–8.2)
RBC MAR: 4.08 10^6/UL (ref 4.7–6.1)
RBC MAR: 4.16 10^6/UL (ref 4.7–6.1)
SODIUM SERPLBLD-SCNC: 131 MMOL/L (ref 135–145)
SODIUM SERPLBLD-SCNC: 132 MMOL/L (ref 135–145)

## 2019-01-31 RX ADMIN — IPRATROPIUM BROMIDE SCH MG: 0.5 SOLUTION RESPIRATORY (INHALATION) at 22:26

## 2019-01-31 RX ADMIN — IPRATROPIUM BROMIDE SCH MG: 0.5 SOLUTION RESPIRATORY (INHALATION) at 13:33

## 2019-01-31 RX ADMIN — ENOXAPARIN SODIUM SCH MG: 100 INJECTION SUBCUTANEOUS at 08:54

## 2019-01-31 RX ADMIN — SODIUM CHLORIDE, PRESERVATIVE FREE SCH ML: 5 INJECTION INTRAVENOUS at 18:42

## 2019-01-31 RX ADMIN — METHYLPREDNISOLONE SODIUM SUCCINATE SCH MG: 40 INJECTION, POWDER, FOR SOLUTION INTRAMUSCULAR; INTRAVENOUS at 08:54

## 2019-01-31 RX ADMIN — GABAPENTIN SCH MG: 300 CAPSULE ORAL at 20:40

## 2019-01-31 RX ADMIN — SODIUM CHLORIDE, PRESERVATIVE FREE SCH ML: 5 INJECTION INTRAVENOUS at 08:54

## 2019-01-31 RX ADMIN — NORTRIPTYLINE HYDROCHLORIDE SCH MG: 10 CAPSULE ORAL at 16:32

## 2019-01-31 RX ADMIN — SODIUM CHLORIDE, PRESERVATIVE FREE SCH ML: 5 INJECTION INTRAVENOUS at 23:55

## 2019-01-31 RX ADMIN — LEVALBUTEROL PRN MG: 1.25 SOLUTION RESPIRATORY (INHALATION) at 22:23

## 2019-01-31 RX ADMIN — METHYLPREDNISOLONE SODIUM SUCCINATE SCH MG: 40 INJECTION, POWDER, FOR SOLUTION INTRAMUSCULAR; INTRAVENOUS at 12:31

## 2019-01-31 RX ADMIN — PREGABALIN SCH MG: 100 CAPSULE ORAL at 20:39

## 2019-01-31 RX ADMIN — POLYETHYLENE GLYCOL 3350 SCH: 17 POWDER, FOR SOLUTION ORAL at 08:54

## 2019-01-31 RX ADMIN — IPRATROPIUM BROMIDE SCH MG: 0.5 SOLUTION RESPIRATORY (INHALATION) at 17:59

## 2019-01-31 RX ADMIN — LEVALBUTEROL PRN MG: 1.25 SOLUTION RESPIRATORY (INHALATION) at 13:32

## 2019-01-31 RX ADMIN — BUDESONIDE SCH MG: 0.5 SUSPENSION RESPIRATORY (INHALATION) at 13:33

## 2019-01-31 RX ADMIN — METHYLPREDNISOLONE SODIUM SUCCINATE SCH MG: 40 INJECTION, POWDER, FOR SOLUTION INTRAMUSCULAR; INTRAVENOUS at 19:05

## 2019-01-31 RX ADMIN — BUDESONIDE SCH MG: 0.5 SUSPENSION RESPIRATORY (INHALATION) at 22:24

## 2019-01-31 RX ADMIN — CLINDAMYCIN PHOSPHATE SCH MLS/HR: 900 INJECTION, SOLUTION INTRAVENOUS at 19:06

## 2019-01-31 NOTE — XRAY REPORT
Reason:  dyspnea, cough, fever

Procedure Date:  01/30/2019   

Accession Number:  [id number] / [id number]                    

Procedure:  XR  - Chest 2 View X-Ray CPT Code:  07833

 

FULL RESULT:

 

 

EXAM:

CHEST RADIOGRAPHY

 

EXAM DATE: 1/30/2019 11:53 PM.

 

CLINICAL HISTORY: Dyspnea, cough, fever.

 

COMPARISON: CHEST 1 VIEW 07/02/2018 8:18 PM.

 

TECHNIQUE: 2 views.

 

FINDINGS:

Lungs/Pleura: No focal opacities evident. No pleural effusion. No 

pneumothorax. Normal volumes.

 

Mediastinum: Heart and mediastinal contours are unremarkable.

 

Other: ICD device tip projects over the right ventricle apex.

IMPRESSION: Stable appearance of the chest without acute cardiopulmonary 

abnormality.

[facility]

## 2019-01-31 NOTE — PROVIDER PROGRESS NOTE
Assessment/Plan





- Problem List


(1) SIRS (systemic inflammatory response syndrome)


Assessment/Plan: 


The patient had a fever of 39 degrees C at midnight last night.


He has been cultured and is growing GPC in his blood.


Lactic acid was normal.


Continue empiric iv antibiotics: Zithromax and Ceftriaxone.


Continue oral hydration due to fever, but holding his usual diuretics.


Follow CBC daily.








(2) Gram-positive cocci bacteremia


Assessment/Plan: 


WBC william, but he has received iv steroids.


The sources could be bronchitis (given his COPD exacerbation), but more likely 

his R ear or diabetic foot infection.


CXR was unremarkable at admission.


Will obtain imaging of R ear and R foot, see below.


Continue empiric iv antibiotics and await bacteremia identification and sensi

tivities.


Follow CBC daily.








(3) Diabetic foot ulcer


Qualifiers: 


   Diabetic foot ulcer location: midfoot   Laterality: right   Non-pressure 

ulcer stage: with necrosis of muscle 


Assessment/Plan: 


He has a large callous along the lateral plantar aspect of his R foot and at the

center is an open wound which is dry, deep and with slight surrounding redness.


He cannot get MRIs since he has an ICD.


Will obtain imaging with CT to evaluate for cellulitis or osteo.


Will request a wound consult from [facility] wound service.


If there is osteo will request an Orthopedic consult.











(4) Ear pain, right


Assessment/Plan: 


The patient told his RN that he has a deep ear ache in the R ear today. That ear

had developed sudden hearing loss 3 mos ago and he was being treated for otitis 

media with injections of steroids plus antibiotics.


He cannot get MRIs since he has an ICD.


Will obtain CT imaging of the R ear/temporal bone area to evaluate for 

cellulitis or osteo of the temporal bone or mastoid bone.








(5) Acute exacerbation of chronic obstructive pulmonary disease (COPD)


Assessment/Plan: 


He feels better on steroids and will continue nebulizer around the clock 

treatments, as were planned by admitting No[facility]ist.








(6) Addisons disease


Assessment/Plan: 


He reports being on Hydrocortisone for this, it is currently being substituted 

with iv Solumedrol for COPD exacerbation.








(7) Type 1 diabetes mellitus


Qualifiers: 


   Diabetes mellitus complication status: with hyperglycemia   Qualified 

Code(s): E10.65 - Type 1 diabetes mellitus with hyperglycemia   


Assessment/Plan: 


Glu are running in 400's, likely due to being on steroids and the stress of 

infection.


He is on an Insulin pump. Will allow him to use this whike here, bolusing as per

directions.


[place] request Diabetic Education.


Will order that he may continue his med from home (Symlinpin ) to decrease glu 

levels.


Obtain A1c.


Restart his diabetic neuropathy meds: Lyrica and Gabapentin.








(8) Cardiomyopathy


Qualifiers: 


   Cardiomyopathy type: ischemic   Qualified Code(s): I25.5 - Ischemic 

cardiomyopathy   


Assessment/Plan: 


He had an ICD placed with EF of 28% after his MI in 2012. His last Echo here was

done in 2017, and showed an LVEF of 45% with an antrero-apical scar.


On current admission BNP was low.


Will restart his Toprol XL and Lisinopril, but hold the Spironolactone and Lasix

while he has SIRS.


Patient not getting iv fluids, he is hydrating orally.


Follow BMP daily.








(9) CAD (coronary artery disease)


Qualifiers: 


   Coronary Disease-Associated Artery/Lesion type: native artery   Native vs. 

transplanted heart: native heart   Associated angina: without angina   Qualified

Code(s): I25.10 - Atherosclerotic heart disease of native coronary artery 

without angina pectoris   


Assessment/Plan: 


Will restart his daily ASA and Imdur, hold statin for now.








(10) BPH (benign prostatic hyperplasia)


Assessment/Plan: 


Will restart his Doxazosin.








(11) Anxiety and depression


Assessment/Plan: 


Will restart his Cymbalta, Fluoxetene, Nortryptylin and Wellbutrin.








- Current Meds


Current Meds: 





                               Current Medications











Generic Name Dose Route Start Last Admin





  Trade Name Freq  PRN Reason Stop Dose Admin


 


Acetaminophen  650 mg  01/31/19 01:31  01/31/19 12:39





  Tylenol  PO   650 mg





  Q4HR PRN   Administration





  Pain 1 to 4   





     





     





     


 


Budesonide  0.5 mg  01/31/19 12:18  01/31/19 13:33





  Pulmicort  INH   0.5 mg





  RTBID MARIA ELENA   Administration





     





     





     





     


 


Enoxaparin Sodium  40 mg  01/31/19 09:00  01/31/19 08:54





  Lovenox  SUBQ   40 mg





  DAILY MARIA ELENA   Administration





     





     





     





     


 


Ceftriaxone Sodium 1 gm/  100 mls @ 200 mls/hr  01/31/19 12:00  01/31/19 13:01





  Sodium Chloride  IV   Infused





  DAILY MARIA ELENA   Infusion





     





     





     





     


 


Ipratropium Bromide  0.5 mg  01/31/19 12:00  01/31/19 13:33





  Atrovent  INH   0.5 mg





  RTQID MARIA ELENA   Administration





     





     





     





     


 


Levalbuterol HCl  1.25 mg  01/31/19 12:19  01/31/19 13:32





  Xopenex  INH   1.25 mg





  Q4H PRN   Administration





  Shortness of Air/Wheezing   





     





     





     


 


Methylprednisolone  40 mg  01/31/19 07:00  01/31/19 12:31





  Solu-Medrol (40mg Vial)  IVP   40 mg





  Q6H MARIA ELENA   Administration





     





     





     





     


 


Polyethylene Glycol  17 gm  01/31/19 09:00  01/31/19 08:54





  Miralax  PO   Not Given





  DAILY MARIA ELENA   





     





     





     





     


 


Sodium Chloride  10 ml  01/31/19 09:00  01/31/19 08:54





  Normal Saline Flush 0.9%  IVP   10 ml





  0100,0900,1700 MARIA ELENA   Administration





     





     





     





     














- Lab Result


Fish Bone Diagrams: 


                                 01/31/19 09:45





                                 01/31/19 09:45





- Additional Planning


My Orders: 





My Active Orders





01/31/19 10:25


Insulin Pump Start Training -  [RC] .once 





01/31/19 10:30


Patient Own Med [Patient Own Medication]   1 - 11 each SUBQ .SLIDING SCALE 





01/31/19 12:00


Ipratropium [Atrovent]   0.5 mg INH RTQID 





01/31/19 12:18


Nebulizer/MDI Tx. [RC] .BID/ QID/ Q4 PRN 


Budesonide [Pulmicort]   0.5 mg INH RTBID 





01/31/19 12:19


Levalbuterol [Xopenex]   1.25 mg INH Q4H PRN 





01/31/19 13:35


Diabetic Education [RC] ONCE 














Subjective





- Subjective


Patient Reports: Feeling Better, Resting Comfortably


Nursing Reports: Other (Neb treatments are helping alot.)





Objective


Vital Signs: 





                               Vital Signs - 24 hr











  01/30/19 01/30/19 01/31/19





  23:29 23:33 00:01


 


Temperature 39.5 C H  


 


Heart Rate 120 H 119 H 120 H


 


Heart Rate [   





Brachial]   


 


Respiratory 16 20 21





Rate   


 


Blood Pressure 120/86 H 123/74 


 


Blood Pressure   





[Right Brachial   





artery]   


 


O2 Saturation 92 92 














  01/31/19 01/31/19 01/31/19





  00:15 01:03 01:33


 


Temperature 37.8 C H  


 


Heart Rate  111 H 115 H


 


Heart Rate [   





Brachial]   


 


Respiratory  15 22





Rate   


 


Blood Pressure   101/72


 


Blood Pressure   





[Right Brachial   





artery]   


 


O2 Saturation   90 L














  01/31/19 01/31/19 01/31/19





  02:20 04:31 08:14


 


Temperature 37.0 C 37 C 36.5 C


 


Heart Rate  113 H 


 


Heart Rate [ 113 H  80





Brachial]   


 


Respiratory 20 20 14





Rate   


 


Blood Pressure   


 


Blood Pressure 100/75  106/60





[Right Brachial   





artery]   


 


O2 Saturation 92 92 96














  01/31/19 01/31/19





  12:17 13:32


 


Temperature 36.3 C L 


 


Heart Rate  85


 


Heart Rate [ 82 





Brachial]  


 


Respiratory 14 20





Rate  


 


Blood Pressure  


 


Blood Pressure 100/57 L 





[Right Brachial  





artery]  


 


O2 Saturation 95 








                                     Oxygen











O2 Source [With Activity]      Room air


 


O2 Source                      Room air














I&O (Last 24 Hrs): 





                          Intake and Output Totals x24h











 01/29/19 01/30/19 01/31/19





 23:59 23:59 23:59


 


Intake Total   880


 


Balance   880











General: Alert, Oriented x3, No acute distress


HEENT: Mucous membr. moist/pink, Other (R ear has a clean canal, no pinna 

tenderness or mastoid tenderness, and a white-yellow ear drum (posible 

purulence).)


Neck: Supple


Neuro: Other (R hearing loss)


Cardiovascular: Regular rate, No murmurs


Respiratory: No respiratory distress, Breath sounds nml


Abdomen: Soft, Other (Obese)


Extremities: No edema, Other (He has a large callous along the lateral plantar 

aspect of his R foot and at the center is an open wound which is dry, deep and 

with slight surrounding redness.)





- Results


Results: 





                               Laboratory Results











WBC  14.7 x10^3/uL (4.8-10.8)  H  01/31/19  09:45    


 


RBC  4.16 10^6/uL (4.70-6.10)  L  01/31/19  09:45    


 


Hgb  12.6 g/dL (14.0-18.0)  L  01/31/19  09:45    


 


Hct  38.2 % (42.0-52.0)  L  01/31/19  09:45    


 


MCV  91.6 fL (80.0-94.0)   01/31/19  09:45    


 


MCH  30.2 pg (27.0-31.0)   01/31/19  09:45    


 


MCHC  32.9 g/dL (32.0-36.0)   01/31/19  09:45    


 


RDW  14.8 % (12.0-15.0)   01/31/19  09:45    


 


Plt Count  221 10^3/uL (130-450)   01/31/19  09:45    


 


MPV  8.4 fL (7.4-11.4)   01/31/19  09:45    


 


Neut # (Auto)  14.1 10^3/uL (1.5-6.6)  H  01/31/19  09:45    


 


Lymph # (Auto)  0.3 10^3/uL (1.5-3.5)  L  01/31/19  09:45    


 


Mono # (Auto)  0.3 10^3/uL (0.0-1.0)   01/31/19  09:45    


 


Eos # (Auto)  0.0 10^3/uL (0.0-0.7)   01/31/19  09:45    


 


Baso # (Auto)  0.0 10^3/uL (0.0-0.1)   01/31/19  09:45    


 


Absolute Nucleated RBC  0.00 x10^3/uL  01/31/19  09:45    


 


Nucleated RBC %  0.0 /100WBC  01/31/19  09:45    


 


Sodium  131 mmol/L (135-145)  L  01/31/19  09:45    


 


Potassium  4.2 mmol/L (3.5-5.0)   01/31/19  09:45    


 


Chloride  89 mmol/L (101-111)  L  01/31/19  09:45    


 


Carbon Dioxide  27 mmol/L (21-32)   01/31/19  09:45    


 


Anion Gap  15.0  (6-13)  H  01/31/19  09:45    


 


BUN  17 mg/dL (6-20)   01/31/19  09:45    


 


Creatinine  1.2 mg/dL (0.6-1.2)   01/31/19  09:45    


 


Estimated GFR (MDRD)  62  (>89)  L  01/31/19  09:45    


 


Glucose  458 mg/dL ()  H  01/31/19  09:45    


 


POC Whole Bld Glucose  445 mg/dL (70 - 100)  H  01/31/19  11:49    


 


Lactic Acid  1.3 mmol/L (0.5-2.2)   01/30/19  23:55    


 


Calcium  8.8 mg/dL (8.5-10.3)   01/31/19  09:45    


 


Magnesium  1.9 mg/dL (1.7-2.8)   01/30/19  23:55    


 


Total Bilirubin  0.7 mg/dL (0.2-1.0)   01/30/19  23:55    


 


AST  30 IU/L (10-42)   01/30/19  23:55    


 


ALT  39 IU/L (10-60)   01/30/19  23:55    


 


Alkaline Phosphatase  118 IU/L ()   01/30/19  23:55    


 


Troponin I  < 0.04 ng/mL (<0.49)   01/30/19  23:55    


 


B-Natriuretic Peptide  61 pg/mL (5-100)   01/30/19  23:55    


 


Total Protein  6.9 g/dL (6.7-8.2)   01/30/19  23:55    


 


Albumin  3.5 g/dL (3.2-5.5)   01/30/19  23:55    


 


Globulin  3.4 g/dL (2.1-4.2)   01/30/19  23:55    


 


Albumin/Globulin Ratio  1.0  (1.0-2.2)   01/30/19  23:55    


 


Lipase  17 U/L (22-51)  L  01/30/19  23:55    


 


Serum Ketones  NEGATIVE  (NEGATIVE)   01/30/19  23:55    


 


Influenza A (Rapid)  Negative  (Negative)   01/30/19  23:55    


 


Influenza B (Rapid)  Negative  (Negative)   01/30/19  23:55    














- Procedures


Procedures: 





Procedures





EXPLOR TEND SHEATH-HAND (11/20/14)


REPLACEMENT OF LEFT LENS WITH SYNTH SUB, PERC APPROACH (12/13/18)


REPLACEMENT OF RIGHT LENS WITH SYNTH SUB, PERC APPROACH (01/17/19)











Sepsis Event Note (H)





- Evaluation


Possible source of Sepsis: positive: Pulmonary





- Sepsis Criteria


Sepsis Criteria: Recorded Temperature greater than 38.3C or Less than 36C, 

Recorded Heart Rate greater than 90 bpm, Recorded Respiratory Rate greater than 

20, Respiratory: Increasing oxygen requirements, WBC count greater than 12,000 

or less than 4000

## 2019-01-31 NOTE — HISTORY & PHYSICAL EXAMINATION
Chief Complaint





- Chief Complaint


Chief Complaint: dyspnea, chest tightness





History of Present Illness





- Admitted From


Admitted From:: [facility] ED





- History Obtained From


Records Reviewed: Yes


History obtained from: Patient





- History of Present Illness


HPI Comment/Other: 





Patient is a 59 y/o male with a significant medical history including CHF, MI 

s/p cardiac cath with stents and ICD, COPD, DM with an insulin pump and more 

(please see PMedHx) who presented to the ED with complain of persistent chest 

tightness which started in the afternoon on 1/30/19. He was found to have a 

temperature of 103F in the ED, tachycardic and tachypneic. On auscultation, he 

had significantly decreased/diminished breath sounds. As a result of his 

presentation, it was decided that he be admitted for further treatment.


When I presented for exam, he was just about completing a breathing treatment. 

He denied chest pain, abd pain, nausea, vomiting or diarrhea. He denied any sick

contact. No runny/ stuffy nose or cough. He had significant diminished breath 

sound despite the breathing treatment. The rest of his history was unremarkable





History





- Past Medical History


Cardiovascular: reports: Congestive heart failure, Hypertension, High 

cholesterol, Coronary artery disease, MI, Other


Respiratory: reports: COPD, Shortness of breath, Other


Neuro: reports: Headaches, Peripheral neuropathy


Endocrine/Autoimmune: reports: Type 1 diabetes


GI: reports: GERD, Chronic diarrhea, Other


: reports: Benign prostate hypertrophy, Retention, Renal insuffiency


HEENT: reports: Other


Psych: reports: Depression, Anxiety, Panic attacks, Post traumatic stress 

disorder


Musculoskeletal: reports: Osteoarthritis


Derm: reports: None


MRSA Hx?: No


Other Past Medical History: Pacemaker/AICD, fixed bridge with 5 teeth (upper), 

colitis,





- Past Surgical History


General: reports: Colonoscopy, EGD


Ortho: reports: Carpal Tunnel surgery


/GYN: reports: Other


Cardiovascular: reports: Coronary stent, AICD, Cardiac catheterization


HEENT: reports: Cataracts, Other





- Family & Social History


Family History: Mother: Cancer


Social History Notes: , 3 children, 6 grandkids.  On disability due to 

CAD





- Substance History


Use: Uses substance without health or social issues: Tobacco





- POLST


Patient has POLST: No


POLST Status: Patient was in the process of completing an advanced directive but

has not finished





Meds/Allgy





- Home Medications


Home Medications: 


                                Ambulatory Orders











 Medication  Instructions  Recorded  Confirmed


 


Pantoprazole [Protonix] 40 mg PO BIDAC 11/11/14 01/17/19


 


Spironolactone 12.5 mg PO DAILY 11/11/14 01/17/19


 


Zolpidem Tartrate 10 mg PO QPM PRN 11/11/14 01/17/19


 


raNITIdine HCl [Ranitidine HCl] 300 mg PO QPM 11/11/14 01/17/19


 


Insulin Lispro [Humalog] 1 - 11 units SQ .SLIDING SCALE 12/14/16 01/17/19


 


Pregabalin [Lyrica] 300 mg PO BID 12/14/16 01/17/19


 


Nortriptyline HCl 20 mg PO 0800,1700 05/08/17 01/17/19


 


Alprazolam 0.25 mg PO BID PRN 06/14/17 01/17/19


 


Cetirizine [ZyrTEC] 10 mg PO DAILY 06/14/17 01/17/19


 


Diclofenac Sodium [Voltaren] 4 gm TP QID PRN 06/14/17 01/17/19


 


Lidocaine Patch 5% [Lidoderm Patch] 1 each TOP DAILY PRN 06/14/17 01/17/19


 


Simvastatin 80 mg PO QPM 06/14/17 01/17/19


 


Budesonide [Entocort EC] 9 mg PO DAILY #60 capsule 06/17/17 01/17/19


 


Psyllium [Metamucil] 1 packet PO BID  packet 06/17/17 01/17/19


 


Aspirin Chewable [St Kwadwo 81 mg PO DAILY  tablet 08/06/17 01/17/19





Aspirin]   


 


DULoxetine [Cymbalta] 60 mg PO BID  capsule 08/06/17 01/17/19


 


Furosemide 40 mg PO DAILY 03/28/18 01/17/19


 


Nitroglycerin [Nitrostat] 1 tab SL PRN PRN 03/28/18 01/17/19


 


Pramlintide Acetate [Symlinpen 60] 15 mcg SQ AC 03/28/18 01/17/19


 


Doxazosin [Cardura] 8 mg PO DAILY 06/17/18 01/17/19


 


Isosorbide Mononitrate [Isosorbide 30 mg PO DAILY 08/01/18 01/17/19





Mononitrate ER]   


 


Gabapentin 300 mg PO BID 12/13/18 01/17/19


 


Albuterol 2.5 mg INH Q6H PRN 01/14/19 01/17/19


 


Beclomethasone Dipropionate [Qvar 1 puffs INH BID 01/14/19 01/17/19





Redihaler (40 mcg)]   


 


Bupropion HCl [Bupropion Xl] 450 mg PO DAILY 01/14/19 01/17/19


 


FLUoxetine [PROzac] 10 mg PO DAILY 01/14/19 01/17/19


 


Hydrocortisone 5 mg PO BIDWM 01/14/19 01/17/19


 


Levalbuterol Tartrate 2 puffs INH Q4H PRN MDD 12 01/14/19 01/17/19





[Levalbuterol Tartrate Hfa] puffs/day  


 


Lisinopril 5 mg PO DAILY 01/14/19 01/17/19


 


Metoprolol Succinate 25 mg PO DAILY 01/14/19 01/17/19


 


Multivitamin [Multiple Vitamins] 1 each PO DAILY 01/14/19 01/17/19


 


Tiotropium Bromide [Spiriva 2 puffs INH DAILY 01/14/19 01/17/19





Respimat]   


 


oxyCODONE [Roxicodone] 5 mg PO Q6H PRN 01/14/19 01/17/19














- Allergies


Allergies/Adverse Reactions: 


                                    Allergies











Allergy/AdvReac Type Severity Reaction Status Date / Time


 


No Known Drug Allergies Allergy   Verified 01/30/19 23:28














Review of Systems





- Constitutional


Constitutional: reports: Fever





- Eyes


Eyes: denies: Pain, Blurred vision, Vision loss, Dipolpia





- Ears, Nose & Throat


Ears, Nose & Throat: denies: Ear pain, Nasal pain, Sore throat





- Cardiovascular


Cariovascular: denies: Palpitations, Chest pain, Edema, Lightheadedness





- Respiratory


Respiratory: denies: Cough, Sputum production, Wheezing





- Gastrointestinal


Gastrointestinal: denies: Abdominal pain, Constipation, Diarrhea, Nausea, 

Vomiting





- Genitourinary


Genitourinary: denies: Dysuria, Frequency, Hematuria





- Musculoskeletal


Musculoskeletal: denies: Muscle pain, Back pain





- Integumentary


Integumentary: denies: Rash





- Neurological


Neurological: denies: General weakness, Focal weakness, Headache





- Psychiatric


Psychiatric: reports: Depression, Anxiety





- Endocrine


Endocrine: denies: Polyuria, Polydypsia





- Hematologic/Lymphatic


Hematologic/Lymphatic: denies: Anemia, Bruising


Prior Level of Functionality: 





He is independent of activities of daily living. He going about his errands in 

town today before he came to the ED





Exam





- Vital Signs


Reviewed Vital Signs: Yes


Vital Signs: 





                                Vital Signs x48h











  Temp Pulse Pulse Resp BP BP Pulse Ox


 


 01/31/19 02:20  37.0 C   113 H  20   100/75  92


 


 01/31/19 01:33   115 H   22  101/72   90 L


 


 01/31/19 01:03   111 H   15   


 


 01/31/19 00:15  37.8 C H      


 


 01/31/19 00:01   120 H   21   


 


 01/30/19 23:33   119 H   20  123/74   92


 


 01/30/19 23:29  39.5 C H  120 H   16  120/86 H   92














- Physical Exam


General Appearance: positive: Alert, Mild distress


Eyes Bilateral: positive: Normal inspection, PERRL, EOMI


ENT: positive: ENT inspection nml, No signs of dehydration


Neck: positive: Nml inspection, Thyroid nml, No JVD


Respiratory: positive: Other (very diminished breath sounds).  negative: Chest 

non-tender, Wheezes, Rales, Rhonchi


Cardiovascular: positive: Tachycardia


Abdomen: positive: Non-tender, Other (protuberant abdomen).  negative: Guarding,

 Rebound


Skin: positive: No rash, Warm


Extremities: positive: Non-tender, No pedal edema


Neurologic/Psychiatric: positive: Oriented x3





Sepsis Event Note (H)





- Evaluation


Possible source of Sepsis: positive: Pulmonary





- Sepsis Criteria


Sepsis Criteria: Recorded Temperature greater than 38.3C or Less than 36C, 

Recorded Heart Rate greater than 90 bpm, Recorded Respiratory Rate greater than 

20, Respiratory: Increasing oxygen requirements, WBC count greater than 12,000 

or less than 4000





Conclusion/Plan





- Problem List


(1) Acute exacerbation of chronic obstructive pulmonary disease (COPD)


Conclusion/Plan: 


Patient given a breathing treatment in the ED. Will continue q4hrs prn


Patient given dexamethasone in the ED. Solumedrol q4hrs ordered


Will monitor patient closely








(2) SIRS (systemic inflammatory response syndrome)


Conclusion/Plan: 


Etiology undetermined


?Bronchitis.


Patient given rocephin and azithromycin in the ED.


Will continue. Azithromycin for 4 more days


Blood cultures drawn. 


(However, this were drawn after initial administration of antibiotics)


Tylenol for fever. Chest xray unremarkable.








(3) Type 1 diabetes mellitus


Conclusion/Plan: 


Currently uncontrolled. Likely to worsen on steroids for COPD.


Patient has an insulin pumps. I told him I would like him to turn it of so that 

we could manage him on an insulin drip.


However he declined and is insistent on managing his blood glucose on his own 

using his pump.


POC glucose checks ordered. This result will be presented tot he patient for him

 to administer the dose of insulin indicated by his protocol.


I will not administer any additional extrinsic insulin while his pump is active.


Qualifiers: 


   Diabetes mellitus complication status: with hyperglycemia   Qualified 

Code(s): E10.65 - Type 1 diabetes mellitus with hyperglycemia   





(4) CAD (coronary artery disease)


Conclusion/Plan: 


Continue metoprolol, aspirin, imdur, lisinopril 


Qualifiers: 


   Coronary Disease-Associated Artery/Lesion type: native artery   Native vs. 

transplanted heart: native heart   Associated angina: without angina   Qualified

 Code(s): I25.10 - Atherosclerotic heart disease of native coronary artery 

without angina pectoris   





(5) History of ischemic cardiomyopathy


Conclusion/Plan: 


ICD in place.


On metoprolol and lisinopril








(6) History of CHF (congestive heart failure)


Conclusion/Plan: 


Not in exacerbation. Will continue lasix, spironolactone, metoprolol and 

lisinopril.








(7) Hyperlipidemia


Conclusion/Plan: 


On simvastatin








(8) BPH (benign prostatic hyperplasia)


Conclusion/Plan: 


On doxazosin








(9) Diabetic neuropathy associated with type 1 diabetes mellitus


Conclusion/Plan: 


On gabapentin.








(10) Depression


Conclusion/Plan: 


On duloxetine and buproprion








(11) Anxiety


Conclusion/Plan: 


On alprazolam








(12) Insomnia


Conclusion/Plan: 


On zolpidem








(13) GERD (gastroesophageal reflux disease)


Conclusion/Plan: 


Protonix








- Lab Results


Fish Bones: 


                                 01/30/19 23:55





                                 01/30/19 23:55





Core Measures





- Anticipated LOS


I expect patient to be DC'd or transferred within 96 hours.: Yes





- DVT/VTE - Prophylaxis


VTE/DVT Device ordered at admit?: Yes


VTE/DVT Prophylaxis med ordered at admit?: Yes

## 2019-02-01 ENCOUNTER — HOSPITAL ENCOUNTER (OUTPATIENT)
Dept: HOSPITAL 76 - EMS | Age: 59
Discharge: ANOTHER HOSPITAL | End: 2019-02-01
Attending: SURGERY
Payer: MEDICARE

## 2019-02-01 VITALS — SYSTOLIC BLOOD PRESSURE: 127 MMHG | DIASTOLIC BLOOD PRESSURE: 86 MMHG

## 2019-02-01 DIAGNOSIS — E10.65: ICD-10-CM

## 2019-02-01 DIAGNOSIS — J44.1: ICD-10-CM

## 2019-02-01 DIAGNOSIS — R65.10: ICD-10-CM

## 2019-02-01 DIAGNOSIS — R78.81: Primary | ICD-10-CM

## 2019-02-01 LAB
ANION GAP SERPL CALCULATED.4IONS-SCNC: 11 MMOL/L (ref 6–13)
BASOPHILS NFR BLD AUTO: 0 10^3/UL (ref 0–0.1)
BASOPHILS NFR BLD AUTO: 0.2 %
BUN SERPL-MCNC: 25 MG/DL (ref 6–20)
CALCIUM UR-MCNC: 9.1 MG/DL (ref 8.5–10.3)
CHLORIDE SERPL-SCNC: 92 MMOL/L (ref 101–111)
CO2 SERPL-SCNC: 28 MMOL/L (ref 21–32)
CREAT SERPLBLD-SCNC: 1.1 MG/DL (ref 0.6–1.2)
EOSINOPHIL # BLD AUTO: 0 10^3/UL (ref 0–0.7)
EOSINOPHIL NFR BLD AUTO: 0 %
ERYTHROCYTE [DISTWIDTH] IN BLOOD BY AUTOMATED COUNT: 14.9 % (ref 12–15)
EST. AVERAGE GLUCOSE BLD GHB EST-MCNC: 220 MG/DL (ref 70–100)
GFRSERPLBLD MDRD-ARVRAT: 69 ML/MIN/{1.73_M2} (ref 89–?)
GLUCOSE SERPL-MCNC: 402 MG/DL (ref 70–100)
HB2 TOTAL: 12.6 G/DL
HBA1C BLD-MCNC: 0.99 G/DL
HEMOGLOBIN A1C %: 9.3 % (ref 4.6–6.2)
HGB UR QL STRIP: 13 G/DL (ref 14–18)
LYMPHOCYTES # SPEC AUTO: 0.7 10^3/UL (ref 1.5–3.5)
LYMPHOCYTES NFR BLD AUTO: 3.2 %
MCH RBC QN AUTO: 30.3 PG (ref 27–31)
MCHC RBC AUTO-ENTMCNC: 33.3 G/DL (ref 32–36)
MCV RBC AUTO: 91 FL (ref 80–94)
MONOCYTES # BLD AUTO: 0.9 10^3/UL (ref 0–1)
MONOCYTES NFR BLD AUTO: 3.8 %
NEUTROPHILS # BLD AUTO: 21.3 10^3/UL (ref 1.5–6.6)
NEUTROPHILS # SNV AUTO: 22.9 X10^3/UL (ref 4.8–10.8)
NEUTROPHILS NFR BLD AUTO: 92.8 %
PDW BLD AUTO: 8.5 FL (ref 7.4–11.4)
PLATELET # BLD: 265 10^3/UL (ref 130–450)
RBC MAR: 4.3 10^6/UL (ref 4.7–6.1)
RBC MORPH BLD: (no result)
SODIUM SERPLBLD-SCNC: 131 MMOL/L (ref 135–145)

## 2019-02-01 RX ADMIN — LEVALBUTEROL PRN MG: 1.25 SOLUTION RESPIRATORY (INHALATION) at 09:01

## 2019-02-01 RX ADMIN — LEVALBUTEROL PRN MG: 1.25 SOLUTION RESPIRATORY (INHALATION) at 13:39

## 2019-02-01 RX ADMIN — METHYLPREDNISOLONE SODIUM SUCCINATE SCH: 40 INJECTION, POWDER, FOR SOLUTION INTRAMUSCULAR; INTRAVENOUS at 08:49

## 2019-02-01 RX ADMIN — NORTRIPTYLINE HYDROCHLORIDE SCH MG: 10 CAPSULE ORAL at 16:38

## 2019-02-01 RX ADMIN — IPRATROPIUM BROMIDE SCH MG: 0.5 SOLUTION RESPIRATORY (INHALATION) at 17:07

## 2019-02-01 RX ADMIN — ENOXAPARIN SODIUM SCH MG: 100 INJECTION SUBCUTANEOUS at 08:48

## 2019-02-01 RX ADMIN — METHYLPREDNISOLONE SODIUM SUCCINATE SCH MG: 40 INJECTION, POWDER, FOR SOLUTION INTRAMUSCULAR; INTRAVENOUS at 00:00

## 2019-02-01 RX ADMIN — SODIUM CHLORIDE, PRESERVATIVE FREE SCH ML: 5 INJECTION INTRAVENOUS at 08:48

## 2019-02-01 RX ADMIN — METHYLPREDNISOLONE SODIUM SUCCINATE SCH MG: 40 INJECTION, POWDER, FOR SOLUTION INTRAMUSCULAR; INTRAVENOUS at 06:24

## 2019-02-01 RX ADMIN — METHYLPREDNISOLONE SODIUM SUCCINATE SCH MG: 40 INJECTION, POWDER, FOR SOLUTION INTRAMUSCULAR; INTRAVENOUS at 12:59

## 2019-02-01 RX ADMIN — CLINDAMYCIN PHOSPHATE SCH MLS/HR: 900 INJECTION, SOLUTION INTRAVENOUS at 06:23

## 2019-02-01 RX ADMIN — LEVALBUTEROL PRN MG: 1.25 SOLUTION RESPIRATORY (INHALATION) at 17:07

## 2019-02-01 RX ADMIN — SODIUM CHLORIDE, PRESERVATIVE FREE SCH ML: 5 INJECTION INTRAVENOUS at 16:41

## 2019-02-01 RX ADMIN — GABAPENTIN SCH MG: 300 CAPSULE ORAL at 08:48

## 2019-02-01 RX ADMIN — BUDESONIDE SCH MG: 0.5 SUSPENSION RESPIRATORY (INHALATION) at 09:01

## 2019-02-01 RX ADMIN — IPRATROPIUM BROMIDE SCH MG: 0.5 SOLUTION RESPIRATORY (INHALATION) at 09:01

## 2019-02-01 RX ADMIN — POLYETHYLENE GLYCOL 3350 SCH: 17 POWDER, FOR SOLUTION ORAL at 08:49

## 2019-02-01 RX ADMIN — IPRATROPIUM BROMIDE SCH MG: 0.5 SOLUTION RESPIRATORY (INHALATION) at 13:39

## 2019-02-01 RX ADMIN — PREGABALIN SCH MG: 100 CAPSULE ORAL at 08:48

## 2019-02-01 RX ADMIN — NORTRIPTYLINE HYDROCHLORIDE SCH: 10 CAPSULE ORAL at 08:50

## 2019-02-01 NOTE — ADVANCE CARE PLANNING NOTE
Advance Care Planning





- Date/Time


Date: 19


Time: 10:30





- Purpose of encounter


Text: 





To establish Code status, he wants to change it


To establish his wishes regarding aggressiveness of care desired





- Parties in attendance


Parties in attendance: 





I spoke to the patient in his bed





- Decisional capacity


Decisional capacity of: 





He has full decisional capacity








- Subjective/Patient's story


Subjective/Patient's story: 





The patient is a retired , he had an MI just before starting a new

job in [place], moved to [facility] with his youngest son, has a daughter 

who also is living with him and an older son with a family in [place]. He has 

had diabetes since age 13, is currently on insulin pump.He has peripheral 

neuropathy with complaints of nocturnal pain and numbness in the right foot and 

left leg up to his knee, this is managed with neuropathy meds. He developed an 

ulcer in the middle of a callus on the pedal aspect of his right foot where he 

had prior surgery, a tumor removed off the tendon.  He has not been to his 

podiatrist since this occurred.  3 months ago he developed sudden hearing loss 

and has been treated by ENT for otitis media with oral antibiotics, steroid 

drops and intraoral antibiotics.  He still cannot hear.  He has had pain in that

ear since being admitted.


He always thought that living to age 65 would be his goal and therefore he told 

the emergency room doctor and admitting Nocturnist that he wanted to be a DNR.  

Today he is reconsidering this and would like to be a full code, wishes to sign 

a new POLST form that he wants to show to his 3 children in order to have them 

aware of his wishes before signing the final form. I explained to him the 

meaning of having CPR and its risks and benefits.  I explained to him the 

meaning of having complete aggressive medical care versus selective care versus 

comfort care.





- Objective/Medical story


Objective/Medical Story: 





The patient is a 58-year-old white male with diabetes since age 13, MI 5 years 

ago with an ischemic cardiomyopathy and defibrillator in place, COPD diagnosed 1

year ago, bilateral cataract surgeries done 6 weeks ago and 2 weeks ago, new 

ulcer of his right foot and a 3 month history of right otitis media who was 

admitted with shortness of breath and COPD exacerbation and evidence of sepsis, 

grew Staph aureus on blood cultures and has had poor glucose control on his 

insulin pump, plus evidence of suppurative otitis media. He is being transferred

for higher level of care (needing Infectious Disease consult, ENT consult, an 

Echo or possible SUMMER to evaluate for endocarditis). [We currently have no Echo 

capability, with no staff to perform Echos until 19 and we have no SUMMER 

availability or specialty consultants here].








- Goals of Care


Goals of care determinations: 





Full Code status


He wants to discuss the options of other levels of care with his children, 

before he signs the POLST. He has never signed a POLST.





- Plan


Plan: 





As in Goals of Care above.


Transfer to a hospital facility today with higher level of care available.





- Code Status


Code Status: Attempt Resuscitation





- Time Spent on Advance Care Planning


Time spent on advance care plannin min

## 2019-02-01 NOTE — CT REPORT
Reason:  R foot, eval for osteo or cellulitis

Procedure Date:  01/31/2019   

Accession Number:  [id number] / [id number]                    

Procedure:  CT  - Lower Extremity Right W/WO CPT Code:  

 

FULL RESULT:

 

 

EXAM:

RIGHT ANKLE AND FOOT CT WITHOUT AND WITH CONTRAST

 

EXAM DATE: 1/31/2019 11:32 PM.

 

CLINICAL HISTORY: Hole on the lateral side of the foot, near the base of 

the fifth digit.

 

COMPARISON: LOWER EXTREMITY RIGHT W/O 09/10/2018 1:20 PM.

 

TECHNIQUE: Thin-section axial images were acquired of the foot after 

administration of intravenous contrast. IV contrast: 50 mL Optiray 320. 

Post-processing: Coronal and sagittal reformats. Other: None.

 

In accordance with CT protocol optimization, one or more of the following 

dose reduction techniques were utilized for this exam: automated exposure 

control, adjustment of mA and/or KV based on patient size, or use of 

iterative reconstructive technique.

 

FINDINGS:

Bones: No definite fracture or suspicious bone lesion is seen at this 

time. Please note that exam sensitivity and specificity is lower compared 

to that of MRI.

 

Joints: No dislocation is demonstrated.

 

Soft tissue: Soft tissue thickening as well as subcutaneous stranding is 

noted along the lateral portion of the fifth MTP joint. Soft tissue 

ulceration is noted on the plantar aspect of the fifth MTP joint.

IMPRESSION:

1. Soft tissue ulceration as well as thickening are seen along the 

plantar aspect of the foot near the fifth MTP joint.

2. No definite suspicious bone lesion noted at this time to suggest 

definite osteomyelitis.

3. There is no evidence of an abscess.

4. No acute fracture.

 

RADIA

## 2019-02-01 NOTE — CT REPORT
Reason:  R ear pain today and R hearing loss 3 mos

Procedure Date:  01/31/2019   

Accession Number:  [id number] / [id number]                    

Procedure:  CT  - Head W/WO CPT Code:  

 

FULL RESULT:

 

 

EXAM:

CT HEAD WITHOUT AND WITH CONTRAST

 

EXAM DATE: 1/31/2019 11:27 PM.

 

CLINICAL HISTORY: R ear pain today and R hearing loss 3 mos.

 

COMPARISON: Head CT 01/26/2019.

 

TECHNIQUE: Multiaxial CT images were obtained from the foramen magnum to 

the vertex both without and with contrast. Reformats: Sagittal and 

coronal. High-resolution soft tissue reformats obtained of the right ear 

region as well. IV contrast: Yes without and with 50 mL Optiray 320.

 

In accordance with CT protocol optimization, one or more of the following 

dose reduction techniques were utilized for this exam: automated exposure 

control, adjustment of mA and/or KV based on patient size, or use of 

iterative reconstructive technique.

 

FINDINGS:

Parenchyma: No intraparenchymal hemorrhage. No evidence of mass, midline 

shift, or CT findings of infarction. Gray-white differentiation is 

distinct.

 

Extraaxial Spaces: Normal for age. No subdural or epidural collections 

identified.

 

Ventricles: Normal in size and position.

 

Sinuses and Orbits: Right mastoid opacification. There is soft tissue 

material in the right epitympanum.

 

Bones: No evidence of fracture or calvarial defect.

 

Other: None.

IMPRESSION:

1. Right mastoid effusion and fluid or soft tissue in right middle ear. 

Findings may represent acute or chronic mastoiditis/otitis.

2. Otherwise unremarkable head CT. No enhancing abnormality.

 

RADIA

## 2019-02-06 NOTE — DISCHARGE SUMMARY
Physician: Araseli Vaca MD

DATE OF ADMISSION: 01/31/2019

DATE OF DISCHARGE:  02/01/2019

 

HISTORY OF PRESENT COURSE:  This is a 58-year-old white male with a history of 
diabetes on an insulin pump, recently diagnosed COPD a year ago, prior MI with 
stents and congestive heart failure with ejection fraction of 28 that has 
improved to 45% recently by Echocardiogram; he has an ICD, history of depression
and anxiety, PTSD and panic attacks, and osteoarthritis.  The patient presented 
to the hospital with several days of shortness of breath and fever.  He was felt
to be in a COPD exacerbation, and lab testing showed that he had SIRS and 
glucose of greater than 400.  He was admitted for management of these.

 

HOSPITAL COURSE AND DISCHARGE DIAGNOSES

1.  Systemic inflammatory response syndrome.  The patient had a fever of 39.5 
centigrade, was tachycardic at 120, and glucoses elevated in the 400 range.  His
lactic acid was normal however, at 1.3.  Initially, the complaint was consistent
with COPD, therefore, he had blood cultures x2 and was put on empiric iv 
antibiotics to cover a respiratory source including ceftriaxone IV and 
azithromycin IV.  By the second day, he had negligible respiratory symptoms, and
the chest x-ray had been within normal limits.  Therefore, the source of his 
infection was felt to be nonrespiratory (see below).

2.  Staphylococcus aureus bacteremia.  Over the first night, the patient 
developed positive blood cultures, first reported as Gram-positive cocci and 
later identified as Staphylococcus aureus bacteremia.  His antibiotics were 
therefore changed to vancomycin IV and Zosyn IV.  At the time of transfer, the 
Staph aureus JAQUI and sensitivities were not known, but at the time of this 
dictation, the sensitivities are available (Sensitive to all tested antibiotics 
except Penicillin-G). The source of the bacteremia was felt to be either an ear 
infection or diabetic foot ulcer (see below).

3.  Chronic suppurative otitis media.  The patient gave a description of sudden 
right ear pain on the day after admission.  He also gave a history of three 
months of right sided hearing loss, and was being treated by ENT for otitis 
media with oral antibiotics, eardrops, but the cause of the hearing loss was 
never entirely ascertained.  The patient had imaging done here of the brain and 
ear with a head CT (no MRI could be done because of his defibrillator), and this
showed:  No brain hemorrhage, mass or infarction.  Right mastoid opacification 
and soft tissue material in the right epitympanum.  Bones had no fracture or 
defect.  

4.  Diabetic foot ulcer.  The patient reported having an open wound on the right
foot, dorsal surface where there had been a large callus which had formed after 
he had a remote surgery of the tendon of that foot.  He was treating the ulcer, 
at the center of the callus, with topical Neosporin at home before admission.  
The patient underwent lower extremity CT imaging (no MRI because of having an 
ICD) and this showed:  Soft tissue ulceration as well as thickening along the 
plantar aspect of the foot near the fifth MTP joint, with no bony lesions to 
suggest osteomyelitis, and no evidence of abscess.  The suggestion was that this
was a foot ulcer or cellulitis therefore.  A culture was done of the wound, 
which at the time of this dictation had results available: heavy growth of 
Staphylococcus aureus with sensitivities to all tested antibiotics.  Because of 
the presence of Staphylococcus aureus in the blood, and inability to have an 
Echo test, to evaluate for endocarditis, done here (this service is unavailable 
during this current week) and no [facility] availability here, I reached out for 
transfer to a facility with a higher level of care, and he was accepted in 
transfer on the Hospitalist service by Dr. Wasserman at [facility] in
[place].  He was transferred on 02/01/2019.

5.  Acute exacerbation of chronic obstructive pulmonary disease.  The patient 
had wheezing early in the admission, which was treated with nebulizers and iv 
steroids  He had stable respiratory status on the very next day of admission.

6.  Rosendo's disease.  The patient was on Hydrocortisone treatment at home.  He
was placed on stress doses of steroids at the time of admission here.

7.  Type 1 diabetes with hyperglycemia.  The patient was using an insulin pump 
to manage his elevated sugar.  He had several visits by our diabetic education 
specialist, who noted that he did not remember if he gave himself boluses, or 
did not know how to do that with his insulin pump.  Fingerstick glucose checks 
remained in the 400 range while here.  This was reported to the accepting 
doctor, and the patient was informed that he would be on an IV insulin drip for 
more aggressive management at the accepting facility.

8.  Diabetic neuropathy with type 1 diabetes.  The patient reported having a 
stocking-distribution of numbness, worse on the left leg than the right.  He is 
already on medications for his neuropathy, which were continued.

9.  History of ischemic cardiomyopathy.  The patient was kept on his heart 
failure medications while here.  There were no signs of a CHF exacerbation, and 
BNP level here was 61.

10.  Coronary artery disease.  The patient had no reports of angina, and he was 
kept on his cardiac medications while here.

11.  Benign prostatic hypertrophy.  The patient was continued on his medications
while here.

12.  Anxiety and depression.  The patient was kept on his medications while 
here.

13.  Status post ICD.  There were no significant arrhythmias, no defibrillator 
discharge while here.  The patient was on telemetry during his stay.

14.  Status post lens implants.  The patient gave a history of recent eye 
surgery including lens and cataract surgery six weeks ago on one side, then two 
weeks ago on the other side.



LABORATORY AND IMAGING:  Reviewed and summarized above.

 

ALLERGIES:  NONE.

 

MEDICATIONS AT THE TIME OF DISCHARGE

Extensive:

1.  Albuterol p.r.n.

2.  Alprazolam p.r.n.

3.  QVAR b.i.d.

4.  Bupropion 450 mg daily.

5.  Zyrtec 10 mg daily.

6.  Voltaren was on hold.

7.  Doxazosin 8 mg every night.  

8.  Prozac 10 mg daily.

9.  Lasix 40 mg was on hold.

10.  Gabapentin 300 mg b.i.d.

11.  Hydrocortisone was changed to Solu-Medrol IV t.i.d.

12.  Insulin pump Humalog insulin as above.

13.  Imdur 30 mg daily.

14.  Levalbuterol inhaler was on hold.

15.  Lidoderm patch 5% topically daily p.r.n. pain.

16.  Lisinopril 5 mg daily.

17.  Toprol-XL fit 25 mg daily.

18.  Multivitamin daily.

19.  Sublingual nitroglycerin p.r.n. pain.

20.  Nortriptyline 20 mg b.i.d.

21.  Oxycodone p.r.n. pain.

22.  Protonix 40 mg b.i.d. program.

23.  Pramlintide 15 mcg subcutaneous every evening.

24.  Lyrica 300 mg b.i.d.

25.  Ranitidine 300 mg every night.

26.  Simvastatin 80 mg every night

27.  Spironolactone 12.5 mg daily was on hold.

28.  Spiriva Respimat was on hold.

29.  Zolpidem 10 mg p.r.n.

30.  Baby aspirin daily.

31.  Cymbalta 60 mg b.i.d.

32.  Budesonide 9 mg p.o. daily.

 

CONDITION AT TRANSFER:  Serious.

 

PHYSICAL EXAMINATION  

VITAL SIGNS:  Blood pressure 127/86, heart rate 102 in sinus tachycardia, 
afebrile at 36.7 centigrade, room air saturation 94%.

HEENT:  Unremarkable.

NECK:  Without JVD or carotid bruits.

CHEST:  Clear.  Good air movement.  No wheezes, rales or rhonchi.

HEART:  Heart sounds normal.  No murmur, gallop or heave.

ABDOMEN:  Soft, nontender.  No organomegaly.

EXTREMITIES:  The right foot dorsum had a large callus toward the lateral 
aspect, measuring approximately 8 cm in diameter, with a central ulcer that was 
dry deep and dark.  There was no leg edema.  Positive numbness of both feet.   

 

CODE STATUS:  FULL CODE.

 

FOLLOWUP:  This will be determined after his stay at the other hospital.  

 

Time required to complete this entire discharge, chart review, contact of 
several hospitals, and [facility] to arrange for transfer:  65 minutes.

 

 

cc: Leo Ray MD

DD: 02/06/2019 13:19

TD: 02/06/2019 13:37

Job #: [id number]

********************* 

REVISED 02/06/19 serafin Munoz correction HC#s

Orig. signed 02/06/19@1429

*********************

PAZ

## 2019-02-09 ENCOUNTER — HOSPITAL ENCOUNTER (EMERGENCY)
Dept: HOSPITAL 76 - ED | Age: 59
Discharge: ANOTHER HOSPITAL | End: 2019-02-09
Payer: MEDICARE

## 2019-02-09 ENCOUNTER — HOSPITAL ENCOUNTER (OUTPATIENT)
Dept: HOSPITAL 76 - EMS | Age: 59
Discharge: ANOTHER HOSPITAL | End: 2019-02-09
Attending: SURGERY
Payer: MEDICARE

## 2019-02-09 ENCOUNTER — HOSPITAL ENCOUNTER (OUTPATIENT)
Dept: HOSPITAL 76 - EMS | Age: 59
Discharge: ANOTHER HOSPITAL | End: 2019-02-09
Attending: SURGERY
Payer: MEDICARE

## 2019-02-09 VITALS — DIASTOLIC BLOOD PRESSURE: 86 MMHG | SYSTOLIC BLOOD PRESSURE: 105 MMHG

## 2019-02-09 DIAGNOSIS — E10.65: ICD-10-CM

## 2019-02-09 DIAGNOSIS — I50.9: ICD-10-CM

## 2019-02-09 DIAGNOSIS — I25.5: ICD-10-CM

## 2019-02-09 DIAGNOSIS — I11.0: ICD-10-CM

## 2019-02-09 DIAGNOSIS — I25.2: ICD-10-CM

## 2019-02-09 DIAGNOSIS — I25.10: ICD-10-CM

## 2019-02-09 DIAGNOSIS — I63.9: Primary | ICD-10-CM

## 2019-02-09 DIAGNOSIS — Z87.891: ICD-10-CM

## 2019-02-09 DIAGNOSIS — Z96.41: ICD-10-CM

## 2019-02-09 DIAGNOSIS — Z95.5: ICD-10-CM

## 2019-02-09 DIAGNOSIS — I33.0: ICD-10-CM

## 2019-02-09 DIAGNOSIS — Z95.810: ICD-10-CM

## 2019-02-09 DIAGNOSIS — E10.42: ICD-10-CM

## 2019-02-09 DIAGNOSIS — R94.31: ICD-10-CM

## 2019-02-09 DIAGNOSIS — R41.0: Primary | ICD-10-CM

## 2019-02-09 DIAGNOSIS — E78.00: ICD-10-CM

## 2019-02-09 DIAGNOSIS — G45.9: Primary | ICD-10-CM

## 2019-02-09 LAB
ALBUMIN DIAFP-MCNC: 3.3 G/DL (ref 3.2–5.5)
ALBUMIN/GLOB SERPL: 1 {RATIO} (ref 1–2.2)
ALP SERPL-CCNC: 79 IU/L (ref 42–121)
ALT SERPL W P-5'-P-CCNC: 13 IU/L (ref 10–60)
ANION GAP SERPL CALCULATED.4IONS-SCNC: 10 MMOL/L (ref 6–13)
AST SERPL W P-5'-P-CCNC: 16 IU/L (ref 10–42)
BASOPHILS NFR BLD AUTO: 0 10^3/UL (ref 0–0.1)
BASOPHILS NFR BLD AUTO: 0.4 %
BILIRUB BLD-MCNC: 0.7 MG/DL (ref 0.2–1)
BUN SERPL-MCNC: 13 MG/DL (ref 6–20)
CALCIUM UR-MCNC: 8.5 MG/DL (ref 8.5–10.3)
CHLORIDE SERPL-SCNC: 95 MMOL/L (ref 101–111)
CO2 SERPL-SCNC: 27 MMOL/L (ref 21–32)
CREAT SERPLBLD-SCNC: 1.1 MG/DL (ref 0.6–1.2)
EOSINOPHIL # BLD AUTO: 0 10^3/UL (ref 0–0.7)
EOSINOPHIL NFR BLD AUTO: 0.2 %
ERYTHROCYTE [DISTWIDTH] IN BLOOD BY AUTOMATED COUNT: 15.2 % (ref 12–15)
GFRSERPLBLD MDRD-ARVRAT: 69 ML/MIN/{1.73_M2} (ref 89–?)
GLOBULIN SER-MCNC: 3.3 G/DL (ref 2.1–4.2)
GLUCOSE SERPL-MCNC: 230 MG/DL (ref 70–100)
HGB UR QL STRIP: 11 G/DL (ref 14–18)
LIPASE SERPL-CCNC: 18 U/L (ref 22–51)
LYMPHOCYTES # SPEC AUTO: 0.6 10^3/UL (ref 1.5–3.5)
LYMPHOCYTES NFR BLD AUTO: 5.2 %
MAGNESIUM SERPL-MCNC: 2.1 MG/DL (ref 1.7–2.8)
MCH RBC QN AUTO: 30.6 PG (ref 27–31)
MCHC RBC AUTO-ENTMCNC: 33.1 G/DL (ref 32–36)
MCV RBC AUTO: 92.3 FL (ref 80–94)
MONOCYTES # BLD AUTO: 0.7 10^3/UL (ref 0–1)
MONOCYTES NFR BLD AUTO: 6.1 %
NEUTROPHILS # BLD AUTO: 10.6 10^3/UL (ref 1.5–6.6)
NEUTROPHILS # SNV AUTO: 12.1 X10^3/UL (ref 4.8–10.8)
NEUTROPHILS NFR BLD AUTO: 88.1 %
PDW BLD AUTO: 8.7 FL (ref 7.4–11.4)
PLATELET # BLD: 267 10^3/UL (ref 130–450)
PROT SPEC-MCNC: 6.6 G/DL (ref 6.7–8.2)
RBC MAR: 3.61 10^6/UL (ref 4.7–6.1)
SODIUM SERPLBLD-SCNC: 132 MMOL/L (ref 135–145)

## 2019-02-09 PROCEDURE — 83690 ASSAY OF LIPASE: CPT

## 2019-02-09 PROCEDURE — 87040 BLOOD CULTURE FOR BACTERIA: CPT

## 2019-02-09 PROCEDURE — 84484 ASSAY OF TROPONIN QUANT: CPT

## 2019-02-09 PROCEDURE — 36415 COLL VENOUS BLD VENIPUNCTURE: CPT

## 2019-02-09 PROCEDURE — 70498 CT ANGIOGRAPHY NECK: CPT

## 2019-02-09 PROCEDURE — 85025 COMPLETE CBC W/AUTO DIFF WBC: CPT

## 2019-02-09 PROCEDURE — 83605 ASSAY OF LACTIC ACID: CPT

## 2019-02-09 PROCEDURE — 96374 THER/PROPH/DIAG INJ IV PUSH: CPT

## 2019-02-09 PROCEDURE — 99285 EMERGENCY DEPT VISIT HI MDM: CPT

## 2019-02-09 PROCEDURE — 83735 ASSAY OF MAGNESIUM: CPT

## 2019-02-09 PROCEDURE — 70496 CT ANGIOGRAPHY HEAD: CPT

## 2019-02-09 PROCEDURE — 80320 DRUG SCREEN QUANTALCOHOLS: CPT

## 2019-02-09 PROCEDURE — 80053 COMPREHEN METABOLIC PANEL: CPT

## 2019-02-09 PROCEDURE — 93005 ELECTROCARDIOGRAM TRACING: CPT

## 2019-02-09 NOTE — CT REPORT
Reason:  slurred speech, confusion, poss endocarditis

Procedure Date:  02/09/2019   

Accession Number:  [id number] / [id number]                    

Procedure:  CT  - Head Angio CPT Code:  

 

FULL RESULT:

 

 

EXAM:

CT ANGIOGRAM HEAD AND NECK.

CT SCAN HEAD WITHOUT AND WITH CONTRAST.

 

EXAM DATE:2/9/2019 05:49 PM.

 

CLINICAL HISTORY:58-year-old with possible endocarditis presenting with 

slurred speech and confusion. Evaluate for intracranial pathology or 

vascular pathology.

 

COMPARISON:CT head 01/31/2019; CTA head and neck 05/08/2017.

 

TECHNIQUE: Routine axial helical CTA imaging was performed from the 

aortic arch through the [place] of [place]. Routine axial CT imaging of the 

head was performed prior to and following contrast administration. 

Reconstructions: Routine multiplanar 3D MIP reconstructions. IV contrast: 

OPTI 320  80mL.

NASCET Criteria are used for stenosis measurements.

 

In accordance with CT protocol optimization, one or more of the following 

dose reduction techniques were utilized for this exam: automated exposure 

control, adjustment of mA and/or KV based on patient size, or use of 

iterative reconstructive technique.

 

FINDINGS:

CT SCAN HEAD:

Parenchyma: No intraparenchymal hemorrhage. No evidence of mass, midline 

shift, or CT findings of acute infarction. Gray-white differentiation is 

distinct.

 

Extraaxial Spaces: Normal for age. No subdural or epidural collections 

identified.

 

Ventricles: Normal in size and position.

 

Sinuses and Orbits: Changes of bilateral lens replacement. Visualized 

paranasal sinuses, mastoid air cells, middle ear cavities appear clear.

 

Bones: No evidence of fracture or calvarial defect.

 

Other: Vascular calcifications of the cavernous ICA segments.

 

CT ANGIOGRAM HEAD AND NECK:

 

The aortic arch appears normal. The left common carotid artery arises 

from the right brachiocephalic artery. There is atherosclerotic plaque 

involving the right subclavian artery with no high-grade stenosis.

 

RIGHT:

Common Carotid Artery: Patent without significant stenosis.

Carotid Bulb: There is no significant atherosclerotic plaque at the 

bifurcation and siphon. Stenosis at the bifurcation by NASCET criteria: 

No significant stenosis.

Internal Carotid Artery: No evidence of dissection. No evidence of 

aneurysm along the intracranial ICA.

 

External Carotid Artery: Unremarkable.

 

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 

congenital as the transverse foramen are smaller on the right. Overall 

caliber appears similar to CTA 05/08/2017. No aneurysm. No dissection.

 

Anterior Cerebral Artery: Atretic absent A1 segment. Azygous A2 segment. 

Distal NIKOLAY branches appear normal. Findings suggest isolated right NIKOLAY.

Middle Cerebral Artery: Patent without significant stenosis, aneurysm, or 

vascular malformation.

Posterior Cerebral Artery: Fetal-like PCA. No aneurysm.

Posterior Communicating Artery: Patent. No aneurysm.

 

LEFT:

Common Carotid Artery: Patent without significant stenosis.

Carotid Bulb: There is no significant atherosclerotic plaque at the 

bifurcation and siphon. Stenosis at the bifurcation by NASCET criteria: 

No significant stenosis.

Internal Carotid Artery: No evidence of dissection. No evidence of 

aneurysm along the intracranial ICA.

 

External Carotid Artery: Unremarkable.

 

Vertebral Artery: Dominant. Patent without significant stenosis. No 

evidence of dissection.

 

Anterior Cerebral Artery: Normal A1 segment. Azygous AG segment. Distal 

NIKOLAY branches appear normal. No aneurysm.

Middle Cerebral Artery: Patent without significant stenosis, aneurysm, or 

vascular malformation.

Posterior Cerebral Artery: Patent without significant stenosis, aneurysm, 

or vascular malformation.

Posterior Communicating Artery: Patent. No aneurysm.

 

CENTRAL:

Anterior Communicating Artery: Patent. No aneurysm.

Basilar Artery: Patent without significant stenosis. No aneurysm.

 

DURAL VENOUS SINUSES AND MAJOR CENTRAL VEINS: Patent.

 

OTHER:

The visualized pharynx and larynx appear normal. Major salivary glands 

appear normal. Thyroid gland appears normal. No cervical lymphadenopathy 

or necrotic lymph nodes seen. Soft tissues of the neck appear normal.

Visualized lung apices are clear.

No acute fracture or traumatic subluxation of the cervical spine. 

Multilevel degenerative changes. No suspicious osseous lesion.

 

POST-CONTRAST HEAD: No abnormal enhancement.

IMPRESSION:

CT SCAN HEAD:

1. No definite acute infarct seen. If there is clinical concern for acute 

stroke or if symptoms persist, an MR brain could be considered to 

evaluate for small or subtle pathology.

 

ASPECTS 10R/10L

 

2. No acute intracranial hemorrhage, mass, hydrocephalus or midline 

shift. No abnormal postcontrast enhancement.

 

CT ANGIOGRAM NECK:

1. 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 

transverse foramen are smaller on the right. The caliber of the right 

vertebral artery appears similar to CTA 05/08/2017.

 

2. Otherwise, the vasculature of the neck demonstrates no significant 

stenosis or dissection.

 

CT ANGIOGRAM HEAD:

1. No large vessel occlusion.

 

2. No aneurysm. No significant stenosis.

 

RADIA

## 2019-02-09 NOTE — ED PHYSICIAN DOCUMENTATION
PD HPI ALTERED MENTAL STATUS





- Stated complaint


Stated Complaint: CONFUSION





- Chief complaint


Chief Complaint: Neuro





- History obtained from


History obtained from: Patient, EMS





- History of Present Illness


Timing - onset: Today (This is a 58-year-old gentleman with history of diabetes,

on insulin pump.  He was admitted here on January 31 and eventually transferred 

out on February 1 because he had blood cultures positive for staph and there was

a concern that it might be either an ENT source or potentially endocarditis.  

Patient is a little confused, so I will try to get the discharge summary from 

[facility].  It sounds like he did have a SUMMER but he does not know the results, 

so I am not sure if he has endocarditis or not.  He says he is on Ancef, 3 times

a day for a total of 9 weeks via a right arm PICC line.  He did miss a dose last

night and this morning it was in half.  This afternoon he became confused and 

had slurred speech which she says is somewhat better now although it is hard to 

tell how much better he is for example, I asked him when his son is coming he 

says "well he should be here as he dropped me off" but he actually came by 

ambulance.)





Review of Systems


Ten Systems: 10 systems reviewed and negative


Constitutional: denies: Fever, Chills


Cardiac: denies: Chest pain / pressure, Palpitations


Respiratory: denies: Dyspnea, Cough


GI: denies: Abdominal Pain, Nausea, Vomiting





PD PAST MEDICAL HISTORY





- Past Medical History


Cardiovascular: Congestive heart failure, Hypertension, High cholesterol, 

Coronary artery disease, MI, Other


Respiratory: COPD, Shortness of breath, Other


Neuro: Headaches, Peripheral neuropathy


Endocrine/Autoimmune: Type 1 diabetes


GI: GERD, Chronic diarrhea, Other


: Benign prostate hypertrophy, Retention, Renal insuffiency


HEENT: Other


Psych: Depression, Anxiety, Panic attacks, Post traumatic stress disorder


Musculoskeletal: Osteoarthritis


Derm: None





- Past Surgical History


Past Surgical History: Yes


General: Colonoscopy, EGD


Ortho: Carpal Tunnel surgery


/GYN: Other


Cardiovascular: Coronary stent, AICD, Cardiac catheterization


HEENT: Cataracts, Other





- Present Medications


Home Medications: 


                                Ambulatory Orders











 Medication  Instructions  Recorded  Confirmed


 


RX: Pantoprazole [Protonix] 40 mg PO BIDAC 11/11/14 02/09/19


 


RX: Spironolactone 12.5 mg PO DAILY 11/11/14 02/09/19


 


RX: Zolpidem Tartrate 10 mg PO QPM PRN 11/11/14 02/09/19


 


RX: raNITIdine HCl [Ranitidine HCl] 300 mg PO QPM 11/11/14 02/09/19


 


RX: Insulin Lispro [Humalog] 1 - 11 units SQ .SLIDING SCALE 12/14/16 02/09/19


 


RX: Pregabalin [Lyrica] 300 mg PO BID 12/14/16 02/09/19


 


RX: Nortriptyline HCl 20 mg PO 0800,1700 05/08/17 02/09/19


 


RX: Alprazolam 0.25 mg PO BID PRN 06/14/17 02/09/19


 


RX: Cetirizine [ZyrTEC] 10 mg PO DAILY 06/14/17 02/09/19


 


RX: Diclofenac Sodium [Voltaren] 4 gm TP QID PRN 06/14/17 02/09/19


 


RX: Lidocaine Patch 5% [Lidoderm 1 each TOP DAILY PRN 06/14/17 02/09/19





Patch]   


 


RX: Simvastatin 80 mg PO QPM 06/14/17 02/09/19


 


RX: Budesonide [Entocort EC] 9 mg PO DAILY #60 capsule 06/17/17 02/09/19


 


RX: Aspirin Chewable [St Kw[place]wo 81 mg PO DAILY  tablet 08/06/17 02/09/19





Aspirin]   


 


RX: DULoxetine [Cymbalta] 60 mg PO BID  capsule 08/06/17 02/09/19


 


RX: Furosemide 40 mg PO DAILY 03/28/18 02/09/19


 


RX: Nitroglycerin [Nitrostat] 0.4 mg SL Q5M PRN 03/28/18 02/09/19


 


RX: Pramlintide Acetate [Symlinpen 15 mcg SQ AC 03/28/18 02/09/19





60]   


 


RX: Isosorbide Mononitrate 30 mg PO DAILY 08/01/18 02/09/19





[Isosorbide Mononitrate ER]   


 


RX: Gabapentin 300 mg PO BID 12/13/18 02/09/19


 


Multivitamin [Multiple Vitamins] 1 each PO DAILY 01/14/19 02/09/19


 


RX: Albuterol 2.5 mg INH Q6H PRN 01/14/19 02/09/19


 


RX: Bupropion HCl [Bupropion Xl] 450 mg PO DAILY 01/14/19 02/09/19


 


RX: FLUoxetine [PROzac] 10 mg PO DAILY 01/14/19 02/09/19


 


RX: Hydrocortisone 5 mg PO BIDWM 01/14/19 02/09/19


 


RX: Levalbuterol Tartrate 2 puffs INH Q4H PRN MDD 12 01/14/19 02/09/19





[Levalbuterol Tartrate Hfa] puffs/day  


 


RX: Lisinopril 5 mg PO DAILY 01/14/19 02/09/19


 


RX: Metoprolol Succinate 25 mg PO DAILY 01/14/19 02/09/19


 


RX: Tiotropium Bromide [Spiriva 2 puffs INH DAILY 01/14/19 02/09/19





Respimat]   


 


RX: oxyCODONE [Roxicodone] 5 mg PO Q6H PRN 01/14/19 02/09/19


 


RX: Beclomethasone Dipropionate 1 puffs INH BID 01/31/19 02/09/19





[Qvar Redihaler (40 mcg)]   


 


RX: Doxazosin Mesylate 8 mg PO QPM 01/31/19 02/09/19














- Allergies


Allergies/Adverse Reactions: 


                                    Allergies











Allergy/AdvReac Type Severity Reaction Status Date / Time


 


No Known Drug Allergies Allergy   Verified 02/09/19 18:38














- Social History


Does the pt smoke?: No


Smoking Status: Former smoker


Does the pt drink ETOH?: No


Does the pt have substance abuse?: No





- Immunizations


Immunizations are current?: Yes





- POLST


Patient has POLST: No


POLST Status: Patient was in the process of completing an advanced directive but

has not finished





PD ED PE NORMAL





- Vitals


Vital signs reviewed: Yes





- General


General: Alert and oriented X 3 (Slightly somnolent but alert and oriented x3 

but a poor historian for short-term events), No acute distress





- HEENT


HEENT: PERRL, EOMI





- Neck


Neck: Supple, no meningeal sign, No bony TTP





- Cardiac


Cardiac: RRR, No murmur





- Respiratory


Respiratory: No respiratory distress, Clear bilaterally





- Abdomen


Abdomen: Normal bowel sounds, Soft, Non tender





- Back


Back: No CVA TTP, No spinal TTP





- Derm


Derm: Normal color, Warm and dry





- Neuro


Neuro: Alert and oriented X 3, Normal speech


Eye Opening: To Voice


Motor: Obeys Commands


Verbal: Oriented


GCS Score: 14





- Psych


Psych: Normal mood, Normal affect





Results





- Vitals


Vitals: 


                               Vital Signs - 24 hr











  02/09/19 02/09/19 02/09/19





  16:23 18:35 19:53


 


Temperature 36.5 C  


 


Heart Rate 85 75 76


 


Respiratory 17 15 18





Rate   


 


Blood Pressure 108/67 118/70 114/76


 


O2 Saturation 94 95 98














  02/09/19





  21:07


 


Temperature 


 


Heart Rate 77


 


Respiratory 15





Rate 


 


Blood Pressure 105/86 H


 


O2 Saturation 98








                                     Oxygen











O2 Source []                   Room air


 


O2 Source                      Room air

















- EKG (time done)


  ** 1725


Rate: Rate (enter#) (77)


Rhythm: NSR


Axis: Normal


Intervals: Normal TX


QRS: Normal


Ischemia: Normal ST segments


Computer interpretation: Agree with computer





- Labs


Labs: 


                                Laboratory Tests











  02/09/19 02/09/19 02/09/19





  16:58 16:58 17:01


 


WBC    12.1 H


 


RBC    3.61 L


 


Hgb    11.0 L


 


Hct    33.3 L


 


MCV    92.3


 


MCH    30.6


 


MCHC    33.1


 


RDW    15.2 H


 


Plt Count    267


 


MPV    8.7


 


Neut # (Auto)    10.6 H


 


Lymph # (Auto)    0.6 L


 


Mono # (Auto)    0.7


 


Eos # (Auto)    0.0


 


Baso # (Auto)    0.0


 


Absolute Nucleated RBC    0.00


 


Nucleated RBC %    0.0


 


Sodium  132 L  


 


Potassium  4.4  


 


Chloride  95 L  


 


Carbon Dioxide  27  


 


Anion Gap  10.0  


 


BUN  13  


 


Creatinine  1.1  


 


Estimated GFR (MDRD)  69 L  


 


Glucose  230 H  


 


Lactic Acid   


 


Calcium  8.5  


 


Magnesium  2.1  


 


Total Bilirubin  0.7  


 


AST  16  


 


ALT  13  


 


Alkaline Phosphatase  79  


 


Troponin I   < 0.04 


 


Total Protein  6.6 L  


 


Albumin  3.3  


 


Globulin  3.3  


 


Albumin/Globulin Ratio  1.0  


 


Lipase  18 L  


 


Ethyl Alcohol  < 5.0  














  02/09/19





  17:01


 


WBC 


 


RBC 


 


Hgb 


 


Hct 


 


MCV 


 


MCH 


 


MCHC 


 


RDW 


 


Plt Count 


 


MPV 


 


Neut # (Auto) 


 


Lymph # (Auto) 


 


Mono # (Auto) 


 


Eos # (Auto) 


 


Baso # (Auto) 


 


Absolute Nucleated RBC 


 


Nucleated RBC % 


 


Sodium 


 


Potassium 


 


Chloride 


 


Carbon Dioxide 


 


Anion Gap 


 


BUN 


 


Creatinine 


 


Estimated GFR (MDRD) 


 


Glucose 


 


Lactic Acid  0.9


 


Calcium 


 


Magnesium 


 


Total Bilirubin 


 


AST 


 


ALT 


 


Alkaline Phosphatase 


 


Troponin I 


 


Total Protein 


 


Albumin 


 


Globulin 


 


Albumin/Globulin Ratio 


 


Lipase 


 


Ethyl Alcohol 














- Rads (name of study)


  ** CTA Head and neck


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

 congenital and without change from 5/8/2017.  No abnormalities on noncontrast 

exam.)





PD MEDICAL DECISION MAKING





- ED course


Complexity details: reviewed old records (Discharge summary from [facility] was 

obtained.  He was discharged just yesterday.  He was found to have an identified

 vegetation on a ventricular AICD by SUMMER which was removed.  And his AICD was 

later removed.  The SUMMER read is as such: Severe anteroseptal and apical hypoki

nesis, global hypokinesis, LVEF 35%.  Mild tricuspid, trace mitral and pulmonic 

regurgitation.  Defibrillator leads are present in the right atrium and right 

ventricle with a mobile echogenic density on the right atrial lead consistent 

with vegetation.)


ED course: 





This is a 58-year-old gentleman who presents with strokelike symptoms that are 

improving but not gone in the setting of recent diagnosis of AICD lead 

endocarditis leading to a presumptive diagnosis of septic emboli.  The CT 

angiogram does not show this, but he still high risk given the recent history.  

I spoke with [facility], Dr Christiano Willis, and they authorized him into an transfer 

back to [facility] and they were called for transfer at 7:30 PM.





She was accepted there by Dr. Idalia Sandoval the hospitalist in transfer.  Cobras 

were completed.  He does need transfer to a higher level of care given the 

complicated diagnosis of endocarditis related neurologic symptoms.





Departure





- Departure


Disposition: 02 Transfer Acute Care [facility]


Clinical Impression: 


 Confusion, TIA (transient ischemic attack)





Type 1 diabetes mellitus


Qualifiers:


 Diabetes mellitus complication status: with hyperglycemia Qualified Code(s): 

E10.65 - Type 1 diabetes mellitus with hyperglycemia





CAD (coronary artery disease)


Qualifiers:


 Coronary Disease-Associated Artery/Lesion type: native artery Native vs. 

transplanted heart: native heart Associated angina: without angina Qualified 

Code(s): I25.10 - Atherosclerotic heart disease of native coronary artery 

without angina pectoris





Cardiomyopathy


Qualifiers:


 Cardiomyopathy type: ischemic Qualified Code(s): I25.5 - Ischemic 

cardiomyopathy





Endocarditis


Qualifiers:


 Endocarditis type: infective Infective endocarditis organism: bacterial 

Chronicity: acute Qualified Code(s): I33.0 - Acute and subacute infective 

endocarditis





Condition: Serious


Discharge Date/Time: 02/09/19 21:16

## 2019-02-09 NOTE — CT REPORT
Reason:  slurred speech, confusion, poss endocarditis

Procedure Date:  02/09/2019   

Accession Number:  [id number] / [id number]                    

Procedure:  CT  - Neck Angio CPT Code:  

 

FULL RESULT:

 

 

EXAM:

CT ANGIOGRAM HEAD AND NECK.

CT SCAN HEAD WITHOUT AND WITH CONTRAST.

 

EXAM DATE:2/9/2019 05:49 PM.

 

CLINICAL HISTORY:58-year-old with possible endocarditis presenting with 

slurred speech and confusion. Evaluate for intracranial pathology or 

vascular pathology.

 

COMPARISON:CT head 01/31/2019; CTA head and neck 05/08/2017.

 

TECHNIQUE: Routine axial helical CTA imaging was performed from the 

aortic arch through the [place] of [place]. Routine axial CT imaging of the 

head was performed prior to and following contrast administration. 

Reconstructions: Routine multiplanar 3D MIP reconstructions. IV contrast: 

OPTI 320  80mL.

NASCET Criteria are used for stenosis measurements.

 

In accordance with CT protocol optimization, one or more of the following 

dose reduction techniques were utilized for this exam: automated exposure 

control, adjustment of mA and/or KV based on patient size, or use of 

iterative reconstructive technique.

 

FINDINGS:

CT SCAN HEAD:

Parenchyma: No intraparenchymal hemorrhage. No evidence of mass, midline 

shift, or CT findings of acute infarction. Gray-white differentiation is 

distinct.

 

Extraaxial Spaces: Normal for age. No subdural or epidural collections 

identified.

 

Ventricles: Normal in size and position.

 

Sinuses and Orbits: Changes of bilateral lens replacement. Visualized 

paranasal sinuses, mastoid air cells, middle ear cavities appear clear.

 

Bones: No evidence of fracture or calvarial defect.

 

Other: Vascular calcifications of the cavernous ICA segments.

 

CT ANGIOGRAM HEAD AND NECK:

 

The aortic arch appears normal. The left common carotid artery arises 

from the right brachiocephalic artery. There is atherosclerotic plaque 

involving the right subclavian artery with no high-grade stenosis.

 

RIGHT:

Common Carotid Artery: Patent without significant stenosis.

Carotid Bulb: There is no significant atherosclerotic plaque at the 

bifurcation and siphon. Stenosis at the bifurcation by NASCET criteria: 

No significant stenosis.

Internal Carotid Artery: No evidence of dissection. No evidence of 

aneurysm along the intracranial ICA.

 

External Carotid Artery: Unremarkable.

 

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 

congenital as the transverse foramen are smaller on the right. Overall 

caliber appears similar to CTA 05/08/2017. No aneurysm. No dissection.

 

Anterior Cerebral Artery: Atretic absent A1 segment. Azygous A2 segment. 

Distal NIKOLAY branches appear normal. Findings suggest isolated right NIKOLAY.

Middle Cerebral Artery: Patent without significant stenosis, aneurysm, or 

vascular malformation.

Posterior Cerebral Artery: Fetal-like PCA. No aneurysm.

Posterior Communicating Artery: Patent. No aneurysm.

 

LEFT:

Common Carotid Artery: Patent without significant stenosis.

Carotid Bulb: There is no significant atherosclerotic plaque at the 

bifurcation and siphon. Stenosis at the bifurcation by NASCET criteria: 

No significant stenosis.

Internal Carotid Artery: No evidence of dissection. No evidence of 

aneurysm along the intracranial ICA.

 

External Carotid Artery: Unremarkable.

 

Vertebral Artery: Dominant. Patent without significant stenosis. No 

evidence of dissection.

 

Anterior Cerebral Artery: Normal A1 segment. Azygous AG segment. Distal 

NIKOLAY branches appear normal. No aneurysm.

Middle Cerebral Artery: Patent without significant stenosis, aneurysm, or 

vascular malformation.

Posterior Cerebral Artery: Patent without significant stenosis, aneurysm, 

or vascular malformation.

Posterior Communicating Artery: Patent. No aneurysm.

 

CENTRAL:

Anterior Communicating Artery: Patent. No aneurysm.

Basilar Artery: Patent without significant stenosis. No aneurysm.

 

DURAL VENOUS SINUSES AND MAJOR CENTRAL VEINS: Patent.

 

OTHER:

The visualized pharynx and larynx appear normal. Major salivary glands 

appear normal. Thyroid gland appears normal. No cervical lymphadenopathy 

or necrotic lymph nodes seen. Soft tissues of the neck appear normal.

Visualized lung apices are clear.

No acute fracture or traumatic subluxation of the cervical spine. 

Multilevel degenerative changes. No suspicious osseous lesion.

 

POST-CONTRAST HEAD: No abnormal enhancement.

IMPRESSION:

CT SCAN HEAD:

1. No definite acute infarct seen. If there is clinical concern for acute 

stroke or if symptoms persist, an MR brain could be considered to 

evaluate for small or subtle pathology.

 

ASPECTS 10R/10L

 

2. No acute intracranial hemorrhage, mass, hydrocephalus or midline 

shift. No abnormal postcontrast enhancement.

 

CT ANGIOGRAM NECK:

1. 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 

transverse foramen are smaller on the right. The caliber of the right 

vertebral artery appears similar to CTA 05/08/2017.

 

2. Otherwise, the vasculature of the neck demonstrates no significant 

stenosis or dissection.

 

CT ANGIOGRAM HEAD:

1. No large vessel occlusion.

 

2. No aneurysm. No significant stenosis.

 

[facility]

## 2019-03-02 ENCOUNTER — HOSPITAL ENCOUNTER (OUTPATIENT)
Dept: HOSPITAL 76 - EMS | Age: 59
Discharge: ANOTHER HOSPITAL | End: 2019-03-02
Attending: SURGERY
Payer: MEDICARE

## 2019-03-02 ENCOUNTER — HOSPITAL ENCOUNTER (EMERGENCY)
Dept: HOSPITAL 76 - ED | Age: 59
Discharge: HOME | End: 2019-03-02
Payer: MEDICARE

## 2019-03-02 VITALS — DIASTOLIC BLOOD PRESSURE: 122 MMHG | SYSTOLIC BLOOD PRESSURE: 154 MMHG
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
## 2019-03-02 DIAGNOSIS — I50.9: ICD-10-CM

## 2019-03-02 DIAGNOSIS — Z95.810: ICD-10-CM

## 2019-03-02 DIAGNOSIS — E10.42: ICD-10-CM

## 2019-03-02 DIAGNOSIS — Z95.1: ICD-10-CM

## 2019-03-02 DIAGNOSIS — S00.03XA: Primary | ICD-10-CM

## 2019-03-02 DIAGNOSIS — I25.10: ICD-10-CM
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
## 2019-03-02 DIAGNOSIS — S09.90XA: Primary | ICD-10-CM
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
## 2019-03-02 DIAGNOSIS — I11.0: ICD-10-CM

## 2019-03-02 DIAGNOSIS — I25.2: ICD-10-CM

## 2019-03-02 DIAGNOSIS — E78.00: ICD-10-CM

## 2019-03-02 DIAGNOSIS — S16.1XXA: ICD-10-CM

## 2019-03-02 PROCEDURE — 72125 CT NECK SPINE W/O DYE: CPT

## 2019-03-02 PROCEDURE — 96372 THER/PROPH/DIAG INJ SC/IM: CPT

## 2019-03-02 PROCEDURE — 70450 CT HEAD/BRAIN W/O DYE: CPT

## 2019-03-02 PROCEDURE — 99283 EMERGENCY DEPT VISIT LOW MDM: CPT

## 2019-03-02 NOTE — CT REPORT
Reason:  fell and struck back of head/neck

Procedure Date:  03/02/2019   

Accession Number:  [id number] / [id number]                    

Procedure:  CT  - CERVICAL SPINE WO CPT Code:  

 

FULL RESULT:

 

 

EXAM:

CT CERVICAL SPINE WITHOUT CONTRAST

 

DATE: 3/2/2019 03:49 PM.

 

HISTORY: Acute pain due to trauma.

 

COMPARISONS: NECK ANGIO 02/09/2019 5:55 PM.

 

TECHNIQUE: Thin-section axial images were acquired of the cervical spine 

without contrast. Post-processing: Coronal and sagittal reformats. Other: 

None.

 

In accordance with CT protocol optimization, one or more of the following 

dose reduction techniques were utilized for this exam: automated exposure 

control, adjustment of mA and/or KV based on patient size, or use of 

iterative reconstructive technique.

 

FINDINGS:

Alignment: No scoliosis or spondylolisthesis.

 

Bones: No fracture or bone lesion.

 

Interspace Levels/Facets: There is mild diffuse degenerative disk disease 

seen throughout the lower aspects of the cervical spine.

 

Musculature: Normal. No fatty atrophy.

 

Other: The paravertebral and prevertebral soft tissues are unremarkable. 

The lung apices are clear.

IMPRESSION: No acute findings. Mild degenerative changes seen.

 

[facility]

## 2019-03-02 NOTE — ED PHYSICIAN DOCUMENTATION
PD HPI HEAD INJURY





- Stated complaint


Stated Complaint: FALL





- Chief complaint


Chief Complaint: Trauma Hd/Nk





- History obtained from


History obtained from: Patient





- History of Present Illness


Mechanism of head injury: Fell (Fell backward needed on his buttock and then 

backward and struck his lower head and upper neck area on bumper of his truck.  

Has pain in those areas.  He denies any numbness or weakness.  He did not have 

any loss of consciousness.  He has localized headache in the back of the head.)


Where head injury occurred: Home


Timing - onset: How many days ago


Location of injury: Back (lower occiput and upper neck area)


Quality of pain: Pain, Aching


Associated symptoms: No: LOC, AMS


Symptoms worsen with: Movement


Contributing factors: No: Anticoagulated, Intoxicated


Similar symptoms before: Has not had sx before





Review of Systems


Constitutional: denies: Fever


Nose: denies: Rhinorrhea / runny nose, Congestion


Throat: denies: Sore throat


Respiratory: denies: Cough


GI: denies: Nausea


Skin: denies: Abrasion (s), Laceration (s)


Neurologic: reports: Generalized weakness, Headache.  denies: Focal weakness, 

Numbness, Confused, Altered mental status





PD PAST MEDICAL HISTORY





- Past Medical History


Past Medical History: Yes


Cardiovascular: Congestive heart failure, Hypertension, High cholesterol, 

Coronary artery disease, MI, Other


Respiratory: COPD, Shortness of breath, Other


Neuro: Headaches, Peripheral neuropathy


Endocrine/Autoimmune: Type 1 diabetes


GI: GERD, Chronic diarrhea, Other


: Benign prostate hypertrophy, Retention, Renal insuffiency


HEENT: Other


Psych: Depression, Anxiety, Panic attacks, Post traumatic stress disorder


Musculoskeletal: Osteoarthritis


Derm: None





- Past Surgical History


Past Surgical History: Yes


General: Colonoscopy, EGD


Ortho: Carpal Tunnel surgery


/GYN: Other


Cardiovascular: Coronary stent, AICD, Cardiac catheterization


HEENT: Cataracts, Other





- Present Medications


Home Medications: 


                                Ambulatory Orders











 Medication  Instructions  Recorded  Confirmed


 


Pantoprazole [Protonix] 40 mg PO BIDAC 11/11/14 02/09/19


 


Spironolactone 12.5 mg PO DAILY 11/11/14 02/09/19


 


Zolpidem Tartrate 10 mg PO QPM PRN 11/11/14 02/09/19


 


raNITIdine HCl [Ranitidine HCl] 300 mg PO QPM 11/11/14 02/09/19


 


Insulin Lispro [Humalog] 1 - 11 units SQ .SLIDING SCALE 12/14/16 02/09/19


 


Pregabalin [Lyrica] 300 mg PO BID 12/14/16 02/09/19


 


Nortriptyline HCl 20 mg PO 0800,1700 05/08/17 02/09/19


 


Alprazolam 0.25 mg PO BID PRN 06/14/17 02/09/19


 


Cetirizine [ZyrTEC] 10 mg PO DAILY 06/14/17 02/09/19


 


Diclofenac Sodium [Voltaren] 4 gm TP QID PRN 06/14/17 02/09/19


 


Lidocaine Patch 5% [Lidoderm Patch] 1 each TOP DAILY PRN 06/14/17 02/09/19


 


Simvastatin 80 mg PO QPM 06/14/17 02/09/19


 


Budesonide [Entocort EC] 9 mg PO DAILY #60 capsule 06/17/17 02/09/19


 


Aspirin Chewable [St Kwadwo 81 mg PO DAILY  tablet 08/06/17 02/09/19





Aspirin]   


 


DULoxetine [Cymbalta] 60 mg PO BID  capsule 08/06/17 02/09/19


 


Furosemide 40 mg PO DAILY 03/28/18 02/09/19


 


Nitroglycerin [Nitrostat] 0.4 mg SL Q5M PRN 03/28/18 02/09/19


 


Pramlintide Acetate [Symlinpen 60] 15 mcg SQ AC 03/28/18 02/09/19


 


Isosorbide Mononitrate [Isosorbide 30 mg PO DAILY 08/01/18 02/09/19





Mononitrate ER]   


 


Gabapentin 300 mg PO BID 12/13/18 02/09/19


 


Albuterol 2.5 mg INH Q6H PRN 01/14/19 02/09/19


 


Bupropion HCl [Bupropion Xl] 450 mg PO DAILY 01/14/19 02/09/19


 


FLUoxetine [PROzac] 10 mg PO DAILY 01/14/19 02/09/19


 


Hydrocortisone 5 mg PO BIDWM 01/14/19 02/09/19


 


Levalbuterol Tartrate 2 puffs INH Q4H PRN MDD 12 01/14/19 02/09/19





[Levalbuterol Tartrate Hfa] puffs/day  


 


Lisinopril 5 mg PO DAILY 01/14/19 02/09/19


 


Metoprolol Succinate 25 mg PO DAILY 01/14/19 02/09/19


 


Multivitamin [Multiple Vitamins] 1 each PO DAILY 01/14/19 02/09/19


 


Tiotropium Bromide [Spiriva 2 puffs INH DAILY 01/14/19 02/09/19





Respimat]   


 


oxyCODONE [Roxicodone] 5 mg PO Q6H PRN 01/14/19 02/09/19


 


Beclomethasone Dipropionate [Qvar 1 puffs INH BID 01/31/19 02/09/19





Redihaler (40 mcg)]   


 


Doxazosin Mesylate 8 mg PO QPM 01/31/19 02/09/19














- Allergies


Allergies/Adverse Reactions: 


                                    Allergies











Allergy/AdvReac Type Severity Reaction Status Date / Time


 


No Known Drug Allergies Allergy   Verified 03/02/19 14:17














- Social History


Does the pt smoke?: No


Smoking Status: Never smoker


Does the pt drink ETOH?: No


Does the pt have substance abuse?: No





- Immunizations


Immunizations are current?: Yes





- POLST


Patient has POLST: No


POLST Status: Patient was in the process of completing an advanced directive but

has not finished





PD ED PE NORMAL





- Vitals


Vital signs reviewed: Yes





- General


General: Alert and oriented X 3, No acute distress, Well developed/nourished





- HEENT


HEENT: PERRL, EOMI, Pharynx benign, Other (back of head and upper neck with 

local tenderness without deformity)





- Neck


Neck: Supple, no meningeal sign, No adenopathy





- Cardiac


Cardiac: RRR, No murmur





- Respiratory


Respiratory: Clear bilaterally, Other (no chestwall tenderness)





- Back


Back: No spinal TTP





- Derm


Derm: Normal color, Warm and dry





- Extremities


Extremities: No tenderness to palpate, Normal ROM s pain





- Neuro


Neuro: Alert and oriented X 3, No motor deficit, Normal speech





Results





- Vitals


Vitals: 


                               Vital Signs - 24 hr











  03/02/19 03/02/19





  14:13 16:36


 


Temperature 36.4 C L 


 


Heart Rate 91 86


 


Respiratory 14 16





Rate  


 


Blood Pressure 111/65 154/122 H


 


O2 Saturation 96 95








                                     Oxygen











O2 Source [With Activity]      Room air


 


O2 Source                      Room air

















- Rads (name of study)


  ** head and neck CT


Radiology: Prelim report reviewed (no noted fractures nor internal bleeding.), 

See rad report





PD MEDICAL DECISION MAKING





- ED course


Complexity details: reviewed old records, reviewed results, considered 

differential, d/w patient





Departure





- Departure


Disposition: 01 Home, Self Care


Clinical Impression: 


Fall from slip, trip, or stumble


Qualifiers:


 Encounter type: initial encounter Qualified Code(s): W01.0XXA - Fall on same 

level from slipping, tripping and stumbling without subsequent striking against 

object, initial encounter





Contusion of occipital region of scalp


Qualifiers:


 Encounter type: initial encounter Qualified Code(s): S00.03XA - Contusion of 

scalp, initial encounter





Neck strain


Qualifiers:


 Encounter type: initial encounter Qualified Code(s): S16.1XXA - Strain of 

muscle, fascia and tendon at neck level, initial encounter





Condition: Stable


Record reviewed to determine appropriate education?: Yes


Instructions:  ED Sprain Strain Neck


Follow-Up: 


Leo Ray MD [Primary Care Provider] - 


Comments: 


Your CT scans did not show any fractures bleeding or acute abnormalities.  You 

will still be sore in the back of the head and neck.  Gentle range of motion for

the neck.  Heat may help loosen up some of the stiffness.  Continue usual 

medications.


Discharge Date/Time: 03/02/19 17:03

## 2019-03-02 NOTE — CT REPORT
Reason:  fell and struck back of head/neck

Procedure Date:  03/02/2019   

Accession Number:  [id number] / [id number]                    

Procedure:  CT  - HEAD WO CPT Code:  

 

FULL RESULT:

 

 

EXAM:

CT HEAD

 

EXAM DATE: 3/2/2019 03:37 PM.

 

CLINICAL HISTORY: Acute pain due to trauma.

 

COMPARISON: NECK ANGIO 02/09/2019 5:55 PM.

 

TECHNIQUE: Multiaxial CT images were obtained from the foramen magnum to 

the vertex. Reformats: Sagittal and coronal. IV contrast: None.

 

In accordance with CT protocol optimization, one or more of the following 

dose reduction techniques were utilized for this exam: automated exposure 

control, adjustment of mA and/or KV based on patient size, or use of 

iterative reconstructive technique.

 

FINDINGS:

Parenchyma: No intraparenchymal hemorrhage. No evidence of mass, midline 

shift, or CT findings of infarction. Gray-white differentiation is 

distinct.

 

Extraaxial Spaces: Normal for age. No subdural or epidural collections 

identified.

 

Ventricles: Normal in size and position.

 

Sinuses and Orbits: Imaged paranasal sinuses, orbits, and mastoids show 

no significant abnormality.

 

Bones: No evidence of fracture or calvarial defect. Nonspecific minor 

fluid opacification of the mastoid air cells.

 

Other: None.

IMPRESSION: No intracranial abnormality demonstrated. Nonspecific minor 

fluid opacification of the mastoid air cells is seen.

 

[facility]

## 2019-03-04 ENCOUNTER — HOSPITAL ENCOUNTER (EMERGENCY)
Dept: HOSPITAL 76 - ED | Age: 59
Discharge: HOME | End: 2019-03-04
Payer: MEDICARE

## 2019-03-04 VITALS — SYSTOLIC BLOOD PRESSURE: 104 MMHG | DIASTOLIC BLOOD PRESSURE: 68 MMHG

## 2019-03-04 DIAGNOSIS — I25.10: ICD-10-CM

## 2019-03-04 DIAGNOSIS — S09.90XA: ICD-10-CM

## 2019-03-04 DIAGNOSIS — I50.9: ICD-10-CM

## 2019-03-04 DIAGNOSIS — I11.0: ICD-10-CM

## 2019-03-04 DIAGNOSIS — Z95.5: ICD-10-CM

## 2019-03-04 DIAGNOSIS — E10.8: ICD-10-CM

## 2019-03-04 DIAGNOSIS — F07.81: Primary | ICD-10-CM

## 2019-03-04 PROCEDURE — 99283 EMERGENCY DEPT VISIT LOW MDM: CPT

## 2019-03-04 PROCEDURE — 99284 EMERGENCY DEPT VISIT MOD MDM: CPT

## 2019-03-04 PROCEDURE — 93005 ELECTROCARDIOGRAM TRACING: CPT

## 2019-03-04 PROCEDURE — 70450 CT HEAD/BRAIN W/O DYE: CPT

## 2019-03-04 NOTE — ED PHYSICIAN DOCUMENTATION
PD HPI HEAD INJURY





- Stated complaint


Stated Complaint: DIZZY





- Chief complaint


Chief Complaint: Neuro





- History obtained from


History obtained from: Patient





- History of Present Illness


Mechanism of head injury: Fell (fell backward 2 days ago and hit occiput on his 

car. CT Head and C Spine were neg. 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 

endocarditis. Now with worse occipital headache and dizzy spells, like an out of

body experience. The PPM was an AICD for EF 25% which has improved, never for 

bradycardia.)





Review of Systems


Constitutional: reports: Reviewed and negative


Ears: reports: Reviewed and negative


Nose: reports: Reviewed and negative


Cardiac: reports: Reviewed and negative


Respiratory: reports: Reviewed and negative





PD PAST MEDICAL HISTORY





- Past Medical History


Cardiovascular: Congestive heart failure, Hypertension, High cholesterol, 

Coronary artery disease, MI, Other


Respiratory: COPD, Shortness of breath, Other


Neuro: Headaches, Peripheral neuropathy


Endocrine/Autoimmune: Type 1 diabetes


GI: GERD, Chronic diarrhea, Other


: Benign prostate hypertrophy, Retention, Renal insuffiency


HEENT: Other


Psych: Depression, Anxiety, Panic attacks, Post traumatic stress disorder


Musculoskeletal: Osteoarthritis


Derm: None





- Past Surgical History


Past Surgical History: Yes


General: Colonoscopy, EGD


Ortho: Carpal Tunnel surgery


/GYN: Other


Cardiovascular: Coronary stent, AICD, Cardiac catheterization


HEENT: Cataracts, Other





- Present Medications


Home Medications: 


                                Ambulatory Orders











 Medication  Instructions  Recorded  Confirmed


 


Pantoprazole [Protonix] 40 mg PO BIDAC 11/11/14 03/04/19


 


Spironolactone 12.5 mg PO DAILY 11/11/14 03/04/19


 


Zolpidem Tartrate 10 mg PO QPM PRN 11/11/14 03/04/19


 


raNITIdine HCl [Ranitidine HCl] 300 mg PO QPM 11/11/14 03/04/19


 


Insulin Lispro [Humalog] 1 - 11 units SQ .SLIDING SCALE 12/14/16 03/04/19


 


Pregabalin [Lyrica] 300 mg PO BID 12/14/16 03/04/19


 


Nortriptyline HCl 20 mg PO 0800,1700 05/08/17 03/04/19


 


Alprazolam 0.25 mg PO BID PRN 06/14/17 03/04/19


 


Cetirizine [ZyrTEC] 10 mg PO DAILY 06/14/17 03/04/19


 


Diclofenac Sodium [Voltaren] 4 gm TP QID PRN 06/14/17 03/04/19


 


Lidocaine Patch 5% [Lidoderm Patch] 1 each TOP DAILY PRN 06/14/17 03/04/19


 


Simvastatin 80 mg PO QPM 06/14/17 03/04/19


 


Budesonide [Entocort EC] 9 mg PO DAILY #60 capsule 06/17/17 03/04/19


 


Aspirin Chewable [St Kwadwo 81 mg PO DAILY  tablet 08/06/17 03/04/19





Aspirin]   


 


DULoxetine [Cymbalta] 60 mg PO BID  capsule 08/06/17 03/04/19


 


Furosemide 40 mg PO DAILY 03/28/18 03/04/19


 


Nitroglycerin [Nitrostat] 0.4 mg SL Q5M PRN 03/28/18 03/04/19


 


Pramlintide Acetate [Symlinpen 60] 15 mcg SQ AC 03/28/18 03/04/19


 


Isosorbide Mononitrate [Isosorbide 30 mg PO DAILY 08/01/18 03/04/19





Mononitrate ER]   


 


Gabapentin 300 mg PO BID 12/13/18 03/04/19


 


Albuterol 2.5 mg INH Q6H PRN 01/14/19 03/04/19


 


Bupropion HCl [Bupropion Xl] 450 mg PO DAILY 01/14/19 03/04/19


 


FLUoxetine [PROzac] 10 mg PO DAILY 01/14/19 03/04/19


 


Hydrocortisone 5 mg PO BIDWM 01/14/19 03/04/19


 


Levalbuterol Tartrate 2 puffs INH Q4H PRN MDD 12 01/14/19 03/04/19





[Levalbuterol Tartrate Hfa] puffs/day  


 


Lisinopril 5 mg PO DAILY 01/14/19 03/04/19


 


Metoprolol Succinate 25 mg PO DAILY 01/14/19 03/04/19


 


Multivitamin [Multiple Vitamins] 1 each PO DAILY 01/14/19 03/04/19


 


Tiotropium Bromide [Spiriva 2 puffs INH DAILY 01/14/19 03/04/19





Respimat]   


 


oxyCODONE [Roxicodone] 5 mg PO Q6H PRN 01/14/19 03/04/19


 


Beclomethasone Dipropionate [Qvar 1 puffs INH BID 01/31/19 03/04/19





Redihaler (40 mcg)]   


 


Doxazosin Mesylate 8 mg PO QPM 01/31/19 03/04/19


 


Oxycodone HCl/Acetaminophen 1 - 2 each PO Q6H PRN #10 tablet 03/04/19 





[Percocet 5-325 mg Tablet]   














- Allergies


Allergies/Adverse Reactions: 


                                    Allergies











Allergy/AdvReac Type Severity Reaction Status Date / Time


 


No Known Drug Allergies Allergy   Verified 03/04/19 16:30














- Social History


Does the pt smoke?: No


Smoking Status: Never smoker


Does the pt drink ETOH?: No


Does the pt have substance abuse?: No





- Immunizations


Immunizations are current?: Yes





- POLST


Patient has POLST: No


POLST Status: Patient was in the process of completing an advanced directive but

has not finished





PD ED PE NORMAL





- Vitals


Vital signs reviewed: Yes





- General


General: Alert and oriented X 3, No acute distress





- HEENT


HEENT: PERRL, EOMI





- Neck


Neck: Supple, no meningeal sign, No bony TTP





- Cardiac


Cardiac: RRR, No murmur





- Derm


Derm: No rash





- Extremities


Extremities: No edema, No calf tenderness / cord





- Neuro


Neuro: Alert and oriented X 3, Normal speech


Eye Opening: Spontaneous


Motor: Obeys Commands


Verbal: Oriented


GCS Score: 15





- Psych


Psych: Normal mood, Normal affect





Results





- Vitals


Vitals: 


                               Vital Signs - 24 hr











  03/04/19 03/04/19 03/04/19





  16:22 18:15 19:10


 


Temperature 37.4 C 37 C 


 


Heart Rate 91 79 82


 


Respiratory 16 16 18





Rate   


 


Blood Pressure 95/56 L 107/65 104/68


 


O2 Saturation 96 96 98








                                     Oxygen











O2 Source []                   Room air


 


O2 Source                      Room air

















- EKG (time done)


  ** 1826


Rate: Rate (enter#) (76)


Rhythm: NSR


Axis: Normal


Intervals: Normal HI


QRS: Normal


Ischemia: Q waves (anterior)


Computer interpretation: Agree with computer





PD MEDICAL DECISION MAKING





- ED course


ED course: 





This is a 58-year-old gentleman who presents with what seems most closely likely

 to be postconcussive syndrome.  He was observed on the monitor without 

significant dysrhythmias or bradycardia's.  Repeat head CT was done given 

persistent and severe headache which was negative.





Departure





- Departure


Disposition: 01 Home, Self Care


Clinical Impression: 


 Dizziness, Post concussion syndrome





Type 1 diabetes mellitus


Qualifiers:


 Diabetes mellitus complication status: with unspecified complications Qualified

 Code(s): E10.8 - Type 1 diabetes mellitus with unspecified complications





Closed head injury


Qualifiers:


 Encounter type: initial encounter Qualified Code(s): S09.90XA - Unspecified 

injury of head, initial encounter





Condition: Good


Record reviewed to determine appropriate education?: Yes


Instructions:  Concussion Dc


Prescriptions: 


Oxycodone HCl/Acetaminophen [Percocet 5-325 mg Tablet] 1 - 2 each PO Q6H PRN #10

tablet


 PRN Reason: pain


Comments: 


Call your doctor to arrange a follow-up appointment, make the next available 

appointment.  In the interim, return anytime if worse or if new symptoms 

develop.

## 2019-03-04 NOTE — CT REPORT
Reason:  head inj

Procedure Date:  03/04/2019   

Accession Number:  [id number] / [id number]                    

Procedure:  CT  - HEAD WO CPT Code:  

 

FULL RESULT:

 

 

EXAM:

CT HEAD

 

EXAM DATE: 3/4/2019 06:42 PM.

 

CLINICAL HISTORY: Head injury.

 

COMPARISON: HEAD W/O 03/02/2019 3:37 PM.

 

TECHNIQUE: Multiaxial CT images were obtained from the foramen magnum to 

the vertex. Reformats: Sagittal and coronal. IV contrast: None.

 

In accordance with CT protocol optimization, one or more of the following 

dose reduction techniques were utilized for this exam: automated exposure 

control, adjustment of mA and/or KV based on patient size, or use of 

iterative reconstructive technique.

 

FINDINGS:

Parenchyma: No intraparenchymal hemorrhage. No evidence of mass, midline 

shift, or CT findings of infarction. Gray-white differentiation is 

distinct.

 

Extraaxial Spaces: Normal for age. No subdural or epidural collections 

identified.

 

Ventricles: Normal in size and position.

 

Sinuses and Orbits: Imaged paranasal sinuses, orbits, and mastoids show 

no significant abnormality.

 

Bones: No evidence of fracture or calvarial defect.

 

Other: None.

IMPRESSION: Stable negative head CT.

 

[facility]

## 2019-03-05 ENCOUNTER — HOSPITAL ENCOUNTER (OUTPATIENT)
Dept: HOSPITAL 76 - LAB | Age: 59
Discharge: HOME | End: 2019-03-05
Attending: INTERNAL MEDICINE
Payer: MEDICARE

## 2019-03-05 DIAGNOSIS — R30.0: Primary | ICD-10-CM

## 2019-03-05 LAB
CASTS URNS QL MICRO: (no result) /LPF
CLARITY UR REFRACT.AUTO: CLEAR
GLUCOSE UR QL STRIP.AUTO: 500 MG/DL
KETONES UR QL STRIP.AUTO: NEGATIVE MG/DL
NITRITE UR QL STRIP.AUTO: NEGATIVE
PH UR STRIP.AUTO: 5.5 PH (ref 5–7.5)
PROT UR STRIP.AUTO-MCNC: NEGATIVE MG/DL
RBC # UR STRIP.AUTO: NEGATIVE /UL
RBC # URNS HPF: (no result) /HPF (ref 0–5)
SP GR UR STRIP.AUTO: 1.02 (ref 1–1.03)
SQUAMOUS URNS QL MICRO: (no result)
UROBILINOGEN UR QL STRIP.AUTO: (no result) E.U./DL
UROBILINOGEN UR STRIP.AUTO-MCNC: NEGATIVE MG/DL

## 2019-03-05 PROCEDURE — 87086 URINE CULTURE/COLONY COUNT: CPT

## 2019-03-05 PROCEDURE — 81001 URINALYSIS AUTO W/SCOPE: CPT

## 2019-03-15 ENCOUNTER — HOSPITAL ENCOUNTER (OUTPATIENT)
Dept: HOSPITAL 76 - LAB.WCP | Age: 59
Discharge: HOME | End: 2019-03-15
Attending: FAMILY MEDICINE
Payer: MEDICARE

## 2019-03-15 DIAGNOSIS — I25.10: ICD-10-CM

## 2019-03-15 DIAGNOSIS — E11.9: Primary | ICD-10-CM

## 2019-03-15 DIAGNOSIS — H91.20: ICD-10-CM

## 2019-03-15 LAB
ALBUMIN DIAFP-MCNC: 3.7 G/DL (ref 3.2–5.5)
ALBUMIN/GLOB SERPL: 1.2 {RATIO} (ref 1–2.2)
ALP SERPL-CCNC: 91 IU/L (ref 42–121)
ALT SERPL W P-5'-P-CCNC: < 10 IU/L (ref 10–60)
ANION GAP SERPL CALCULATED.4IONS-SCNC: 8 MMOL/L (ref 6–13)
AST SERPL W P-5'-P-CCNC: 14 IU/L (ref 10–42)
BILIRUB BLD-MCNC: 0.6 MG/DL (ref 0.2–1)
BUN SERPL-MCNC: 15 MG/DL (ref 6–20)
CALCIUM UR-MCNC: 9.1 MG/DL (ref 8.5–10.3)
CHLORIDE SERPL-SCNC: 103 MMOL/L (ref 101–111)
CO2 SERPL-SCNC: 29 MMOL/L (ref 21–32)
CREAT SERPLBLD-SCNC: 0.9 MG/DL (ref 0.6–1.2)
EST. AVERAGE GLUCOSE BLD GHB EST-MCNC: 203 MG/DL (ref 70–100)
GFRSERPLBLD MDRD-ARVRAT: 87 ML/MIN/{1.73_M2} (ref 89–?)
GLOBULIN SER-MCNC: 3.2 G/DL (ref 2.1–4.2)
GLUCOSE SERPL-MCNC: 128 MG/DL (ref 70–100)
HB2 TOTAL: 13.2 G/DL
HBA1C BLD-MCNC: 0.95 G/DL
HEMOGLOBIN A1C %: 8.7 % (ref 4.6–6.2)
PROT SPEC-MCNC: 6.9 G/DL (ref 6.7–8.2)
SODIUM SERPLBLD-SCNC: 140 MMOL/L (ref 135–145)

## 2019-03-15 PROCEDURE — 80053 COMPREHEN METABOLIC PANEL: CPT

## 2019-03-15 PROCEDURE — 83036 HEMOGLOBIN GLYCOSYLATED A1C: CPT

## 2019-03-15 PROCEDURE — 36415 COLL VENOUS BLD VENIPUNCTURE: CPT

## 2019-04-12 ENCOUNTER — HOSPITAL ENCOUNTER (OUTPATIENT)
Dept: HOSPITAL 76 - LAB | Age: 59
Discharge: HOME | End: 2019-04-12
Attending: INTERNAL MEDICINE
Payer: MEDICARE

## 2019-04-12 DIAGNOSIS — R78.81: Primary | ICD-10-CM

## 2019-04-12 LAB
ALBUMIN DIAFP-MCNC: 3.8 G/DL (ref 3.2–5.5)
ALBUMIN/GLOB SERPL: 1.2 {RATIO} (ref 1–2.2)
ALP SERPL-CCNC: 85 IU/L (ref 42–121)
ALT SERPL W P-5'-P-CCNC: 16 IU/L (ref 10–60)
ANION GAP SERPL CALCULATED.4IONS-SCNC: 10 MMOL/L (ref 6–13)
AST SERPL W P-5'-P-CCNC: 18 IU/L (ref 10–42)
BASOPHILS NFR BLD AUTO: 0.1 10^3/UL (ref 0–0.1)
BASOPHILS NFR BLD AUTO: 0.6 %
BILIRUB BLD-MCNC: 0.5 MG/DL (ref 0.2–1)
BUN SERPL-MCNC: 20 MG/DL (ref 6–20)
CALCIUM UR-MCNC: 8.9 MG/DL (ref 8.5–10.3)
CHLORIDE SERPL-SCNC: 98 MMOL/L (ref 101–111)
CO2 SERPL-SCNC: 30 MMOL/L (ref 21–32)
CREAT SERPLBLD-SCNC: 0.8 MG/DL (ref 0.6–1.2)
CRP SERPL-MCNC: 1.7 MG/DL (ref 0–1)
EOSINOPHIL # BLD AUTO: 0.3 10^3/UL (ref 0–0.7)
EOSINOPHIL NFR BLD AUTO: 3.2 %
ERYTHROCYTE [DISTWIDTH] IN BLOOD BY AUTOMATED COUNT: 14.3 % (ref 12–15)
GFRSERPLBLD MDRD-ARVRAT: 99 ML/MIN/{1.73_M2} (ref 89–?)
GLOBULIN SER-MCNC: 3.3 G/DL (ref 2.1–4.2)
GLUCOSE SERPL-MCNC: 186 MG/DL (ref 70–100)
HGB UR QL STRIP: 13.1 G/DL (ref 14–18)
LYMPHOCYTES # SPEC AUTO: 1.4 10^3/UL (ref 1.5–3.5)
LYMPHOCYTES NFR BLD AUTO: 16 %
MCH RBC QN AUTO: 28.9 PG (ref 27–31)
MCHC RBC AUTO-ENTMCNC: 32.4 G/DL (ref 32–36)
MCV RBC AUTO: 89.2 FL (ref 80–94)
MONOCYTES # BLD AUTO: 0.9 10^3/UL (ref 0–1)
MONOCYTES NFR BLD AUTO: 10.4 %
NEUTROPHILS # BLD AUTO: 6.1 10^3/UL (ref 1.5–6.6)
NEUTROPHILS # SNV AUTO: 8.7 X10^3/UL (ref 4.8–10.8)
NEUTROPHILS NFR BLD AUTO: 69.8 %
PDW BLD AUTO: 8.9 FL (ref 7.4–11.4)
PLATELET # BLD: 238 10^3/UL (ref 130–450)
PROT SPEC-MCNC: 7.1 G/DL (ref 6.7–8.2)
RBC MAR: 4.54 10^6/UL (ref 4.7–6.1)
SODIUM SERPLBLD-SCNC: 138 MMOL/L (ref 135–145)

## 2019-04-12 PROCEDURE — 85651 RBC SED RATE NONAUTOMATED: CPT

## 2019-04-12 PROCEDURE — 85025 COMPLETE CBC W/AUTO DIFF WBC: CPT

## 2019-04-12 PROCEDURE — 86140 C-REACTIVE PROTEIN: CPT

## 2019-04-12 PROCEDURE — 80053 COMPREHEN METABOLIC PANEL: CPT

## 2019-04-12 PROCEDURE — 36415 COLL VENOUS BLD VENIPUNCTURE: CPT

## 2019-04-17 ENCOUNTER — HOSPITAL ENCOUNTER (OUTPATIENT)
Dept: HOSPITAL 76 - LAB.WCP | Age: 59
Discharge: HOME | End: 2019-04-17
Attending: FAMILY MEDICINE
Payer: MEDICARE

## 2019-04-17 DIAGNOSIS — E11.9: Primary | ICD-10-CM

## 2019-04-17 LAB
ANION GAP SERPL CALCULATED.4IONS-SCNC: 8 MMOL/L (ref 6–13)
BUN SERPL-MCNC: 16 MG/DL (ref 6–20)
CALCIUM UR-MCNC: 8.4 MG/DL (ref 8.5–10.3)
CHLORIDE SERPL-SCNC: 99 MMOL/L (ref 101–111)
CO2 SERPL-SCNC: 29 MMOL/L (ref 21–32)
CREAT SERPLBLD-SCNC: 0.8 MG/DL (ref 0.6–1.2)
CREAT UR-SCNC: 22 MG/DL
EST. AVERAGE GLUCOSE BLD GHB EST-MCNC: 209 MG/DL (ref 70–100)
GFRSERPLBLD MDRD-ARVRAT: 99 ML/MIN/{1.73_M2} (ref 89–?)
GLUCOSE SERPL-MCNC: 165 MG/DL (ref 70–100)
HB2 TOTAL: 13.6 G/DL
HBA1C BLD-MCNC: 1.01 G/DL
HEMOGLOBIN A1C %: 8.9 % (ref 4.6–6.2)
MICROALBUMIN UR-MCNC: 0.4 MG/DL (ref 0–300)
MICROALBUMIN/CREAT RATIO PNL UR: 18.2 UG/MG (ref ?–30)
SODIUM SERPLBLD-SCNC: 136 MMOL/L (ref 135–145)

## 2019-04-17 PROCEDURE — 36415 COLL VENOUS BLD VENIPUNCTURE: CPT

## 2019-04-17 PROCEDURE — 83036 HEMOGLOBIN GLYCOSYLATED A1C: CPT

## 2019-04-17 PROCEDURE — 82043 UR ALBUMIN QUANTITATIVE: CPT

## 2019-04-17 PROCEDURE — 80048 BASIC METABOLIC PNL TOTAL CA: CPT

## 2019-04-17 PROCEDURE — 82570 ASSAY OF URINE CREATININE: CPT
